# Patient Record
Sex: MALE | Race: OTHER | HISPANIC OR LATINO | URBAN - METROPOLITAN AREA
[De-identification: names, ages, dates, MRNs, and addresses within clinical notes are randomized per-mention and may not be internally consistent; named-entity substitution may affect disease eponyms.]

---

## 2024-01-01 ENCOUNTER — EMERGENCY (EMERGENCY)
Facility: HOSPITAL | Age: 54
LOS: 0 days | Discharge: HOME | End: 2024-01-02
Attending: STUDENT IN AN ORGANIZED HEALTH CARE EDUCATION/TRAINING PROGRAM
Payer: SELF-PAY

## 2024-01-01 VITALS
SYSTOLIC BLOOD PRESSURE: 120 MMHG | DIASTOLIC BLOOD PRESSURE: 75 MMHG | HEART RATE: 101 BPM | TEMPERATURE: 98 F | RESPIRATION RATE: 20 BRPM | OXYGEN SATURATION: 94 %

## 2024-01-01 DIAGNOSIS — Z20.822 CONTACT WITH AND (SUSPECTED) EXPOSURE TO COVID-19: ICD-10-CM

## 2024-01-01 DIAGNOSIS — R06.02 SHORTNESS OF BREATH: ICD-10-CM

## 2024-01-01 DIAGNOSIS — R05.9 COUGH, UNSPECIFIED: ICD-10-CM

## 2024-01-01 DIAGNOSIS — F10.929 ALCOHOL USE, UNSPECIFIED WITH INTOXICATION, UNSPECIFIED: ICD-10-CM

## 2024-01-01 LAB
ALBUMIN SERPL ELPH-MCNC: 3.9 G/DL — SIGNIFICANT CHANGE UP (ref 3.3–5)
ALBUMIN SERPL ELPH-MCNC: 3.9 G/DL — SIGNIFICANT CHANGE UP (ref 3.3–5)
ALP SERPL-CCNC: 77 U/L — SIGNIFICANT CHANGE UP (ref 40–120)
ALP SERPL-CCNC: 77 U/L — SIGNIFICANT CHANGE UP (ref 40–120)
ALT FLD-CCNC: 17 U/L — SIGNIFICANT CHANGE UP (ref 12–78)
ALT FLD-CCNC: 17 U/L — SIGNIFICANT CHANGE UP (ref 12–78)
ANION GAP SERPL CALC-SCNC: 4 MMOL/L — LOW (ref 5–17)
ANION GAP SERPL CALC-SCNC: 4 MMOL/L — LOW (ref 5–17)
APTT BLD: 35.9 SEC — HIGH (ref 24.5–35.6)
APTT BLD: 35.9 SEC — HIGH (ref 24.5–35.6)
AST SERPL-CCNC: 44 U/L — HIGH (ref 15–37)
AST SERPL-CCNC: 44 U/L — HIGH (ref 15–37)
BASOPHILS # BLD AUTO: 0 K/UL — SIGNIFICANT CHANGE UP (ref 0–0.2)
BASOPHILS # BLD AUTO: 0 K/UL — SIGNIFICANT CHANGE UP (ref 0–0.2)
BASOPHILS NFR BLD AUTO: 0 % — SIGNIFICANT CHANGE UP (ref 0–2)
BASOPHILS NFR BLD AUTO: 0 % — SIGNIFICANT CHANGE UP (ref 0–2)
BILIRUB SERPL-MCNC: 0.1 MG/DL — LOW (ref 0.2–1.2)
BILIRUB SERPL-MCNC: 0.1 MG/DL — LOW (ref 0.2–1.2)
BUN SERPL-MCNC: 5 MG/DL — LOW (ref 7–23)
BUN SERPL-MCNC: 5 MG/DL — LOW (ref 7–23)
CALCIUM SERPL-MCNC: 8.4 MG/DL — LOW (ref 8.5–10.1)
CALCIUM SERPL-MCNC: 8.4 MG/DL — LOW (ref 8.5–10.1)
CHLORIDE SERPL-SCNC: 111 MMOL/L — HIGH (ref 96–108)
CHLORIDE SERPL-SCNC: 111 MMOL/L — HIGH (ref 96–108)
CO2 SERPL-SCNC: 28 MMOL/L — SIGNIFICANT CHANGE UP (ref 22–31)
CO2 SERPL-SCNC: 28 MMOL/L — SIGNIFICANT CHANGE UP (ref 22–31)
CREAT SERPL-MCNC: 0.73 MG/DL — SIGNIFICANT CHANGE UP (ref 0.5–1.3)
CREAT SERPL-MCNC: 0.73 MG/DL — SIGNIFICANT CHANGE UP (ref 0.5–1.3)
EGFR: 108 ML/MIN/1.73M2 — SIGNIFICANT CHANGE UP
EGFR: 108 ML/MIN/1.73M2 — SIGNIFICANT CHANGE UP
EOSINOPHIL # BLD AUTO: 0.23 K/UL — SIGNIFICANT CHANGE UP (ref 0–0.5)
EOSINOPHIL # BLD AUTO: 0.23 K/UL — SIGNIFICANT CHANGE UP (ref 0–0.5)
EOSINOPHIL NFR BLD AUTO: 4 % — SIGNIFICANT CHANGE UP (ref 0–6)
EOSINOPHIL NFR BLD AUTO: 4 % — SIGNIFICANT CHANGE UP (ref 0–6)
ETHANOL SERPL-MCNC: 345 MG/DL — HIGH (ref 0–10)
ETHANOL SERPL-MCNC: 345 MG/DL — HIGH (ref 0–10)
FLUAV AG NPH QL: SIGNIFICANT CHANGE UP
FLUAV AG NPH QL: SIGNIFICANT CHANGE UP
FLUBV AG NPH QL: SIGNIFICANT CHANGE UP
FLUBV AG NPH QL: SIGNIFICANT CHANGE UP
GLUCOSE SERPL-MCNC: 83 MG/DL — SIGNIFICANT CHANGE UP (ref 70–99)
GLUCOSE SERPL-MCNC: 83 MG/DL — SIGNIFICANT CHANGE UP (ref 70–99)
HCT VFR BLD CALC: 41.1 % — SIGNIFICANT CHANGE UP (ref 39–50)
HCT VFR BLD CALC: 41.1 % — SIGNIFICANT CHANGE UP (ref 39–50)
HGB BLD-MCNC: 13.7 G/DL — SIGNIFICANT CHANGE UP (ref 13–17)
HGB BLD-MCNC: 13.7 G/DL — SIGNIFICANT CHANGE UP (ref 13–17)
INR BLD: 0.89 RATIO — SIGNIFICANT CHANGE UP (ref 0.85–1.18)
INR BLD: 0.89 RATIO — SIGNIFICANT CHANGE UP (ref 0.85–1.18)
LYMPHOCYTES # BLD AUTO: 1.89 K/UL — SIGNIFICANT CHANGE UP (ref 1–3.3)
LYMPHOCYTES # BLD AUTO: 1.89 K/UL — SIGNIFICANT CHANGE UP (ref 1–3.3)
LYMPHOCYTES # BLD AUTO: 33 % — SIGNIFICANT CHANGE UP (ref 13–44)
LYMPHOCYTES # BLD AUTO: 33 % — SIGNIFICANT CHANGE UP (ref 13–44)
MAGNESIUM SERPL-MCNC: 2.2 MG/DL — SIGNIFICANT CHANGE UP (ref 1.6–2.6)
MAGNESIUM SERPL-MCNC: 2.2 MG/DL — SIGNIFICANT CHANGE UP (ref 1.6–2.6)
MANUAL SMEAR VERIFICATION: SIGNIFICANT CHANGE UP
MANUAL SMEAR VERIFICATION: SIGNIFICANT CHANGE UP
MCHC RBC-ENTMCNC: 31 PG — SIGNIFICANT CHANGE UP (ref 27–34)
MCHC RBC-ENTMCNC: 31 PG — SIGNIFICANT CHANGE UP (ref 27–34)
MCHC RBC-ENTMCNC: 33.3 GM/DL — SIGNIFICANT CHANGE UP (ref 32–36)
MCHC RBC-ENTMCNC: 33.3 GM/DL — SIGNIFICANT CHANGE UP (ref 32–36)
MCV RBC AUTO: 93 FL — SIGNIFICANT CHANGE UP (ref 80–100)
MCV RBC AUTO: 93 FL — SIGNIFICANT CHANGE UP (ref 80–100)
MONOCYTES # BLD AUTO: 0.51 K/UL — SIGNIFICANT CHANGE UP (ref 0–0.9)
MONOCYTES # BLD AUTO: 0.51 K/UL — SIGNIFICANT CHANGE UP (ref 0–0.9)
MONOCYTES NFR BLD AUTO: 9 % — SIGNIFICANT CHANGE UP (ref 2–14)
MONOCYTES NFR BLD AUTO: 9 % — SIGNIFICANT CHANGE UP (ref 2–14)
NEUTROPHILS # BLD AUTO: 3.09 K/UL — SIGNIFICANT CHANGE UP (ref 1.8–7.4)
NEUTROPHILS # BLD AUTO: 3.09 K/UL — SIGNIFICANT CHANGE UP (ref 1.8–7.4)
NEUTROPHILS NFR BLD AUTO: 54 % — SIGNIFICANT CHANGE UP (ref 43–77)
NEUTROPHILS NFR BLD AUTO: 54 % — SIGNIFICANT CHANGE UP (ref 43–77)
NRBC # BLD: 0 /100 WBCS — SIGNIFICANT CHANGE UP (ref 0–0)
NRBC # BLD: 0 /100 WBCS — SIGNIFICANT CHANGE UP (ref 0–0)
NRBC # BLD: SIGNIFICANT CHANGE UP /100 WBCS (ref 0–0)
NRBC # BLD: SIGNIFICANT CHANGE UP /100 WBCS (ref 0–0)
NT-PROBNP SERPL-SCNC: 14 PG/ML — SIGNIFICANT CHANGE UP (ref 0–125)
NT-PROBNP SERPL-SCNC: 14 PG/ML — SIGNIFICANT CHANGE UP (ref 0–125)
PLAT MORPH BLD: NORMAL — SIGNIFICANT CHANGE UP
PLAT MORPH BLD: NORMAL — SIGNIFICANT CHANGE UP
PLATELET # BLD AUTO: 313 K/UL — SIGNIFICANT CHANGE UP (ref 150–400)
PLATELET # BLD AUTO: 313 K/UL — SIGNIFICANT CHANGE UP (ref 150–400)
POTASSIUM SERPL-MCNC: 3.6 MMOL/L — SIGNIFICANT CHANGE UP (ref 3.5–5.3)
POTASSIUM SERPL-MCNC: 3.6 MMOL/L — SIGNIFICANT CHANGE UP (ref 3.5–5.3)
POTASSIUM SERPL-SCNC: 3.6 MMOL/L — SIGNIFICANT CHANGE UP (ref 3.5–5.3)
POTASSIUM SERPL-SCNC: 3.6 MMOL/L — SIGNIFICANT CHANGE UP (ref 3.5–5.3)
PROT SERPL-MCNC: 8.1 GM/DL — SIGNIFICANT CHANGE UP (ref 6–8.3)
PROT SERPL-MCNC: 8.1 GM/DL — SIGNIFICANT CHANGE UP (ref 6–8.3)
PROTHROM AB SERPL-ACNC: 10.1 SEC — SIGNIFICANT CHANGE UP (ref 9.5–13)
PROTHROM AB SERPL-ACNC: 10.1 SEC — SIGNIFICANT CHANGE UP (ref 9.5–13)
RBC # BLD: 4.42 M/UL — SIGNIFICANT CHANGE UP (ref 4.2–5.8)
RBC # BLD: 4.42 M/UL — SIGNIFICANT CHANGE UP (ref 4.2–5.8)
RBC # FLD: 15.8 % — HIGH (ref 10.3–14.5)
RBC # FLD: 15.8 % — HIGH (ref 10.3–14.5)
RBC BLD AUTO: NORMAL — SIGNIFICANT CHANGE UP
RBC BLD AUTO: NORMAL — SIGNIFICANT CHANGE UP
RSV RNA NPH QL NAA+NON-PROBE: DETECTED
RSV RNA NPH QL NAA+NON-PROBE: DETECTED
SARS-COV-2 RNA SPEC QL NAA+PROBE: SIGNIFICANT CHANGE UP
SARS-COV-2 RNA SPEC QL NAA+PROBE: SIGNIFICANT CHANGE UP
SODIUM SERPL-SCNC: 143 MMOL/L — SIGNIFICANT CHANGE UP (ref 135–145)
SODIUM SERPL-SCNC: 143 MMOL/L — SIGNIFICANT CHANGE UP (ref 135–145)
TROPONIN I, HIGH SENSITIVITY RESULT: 4.16 NG/L — SIGNIFICANT CHANGE UP
TROPONIN I, HIGH SENSITIVITY RESULT: 4.16 NG/L — SIGNIFICANT CHANGE UP
WBC # BLD: 5.72 K/UL — SIGNIFICANT CHANGE UP (ref 3.8–10.5)
WBC # BLD: 5.72 K/UL — SIGNIFICANT CHANGE UP (ref 3.8–10.5)
WBC # FLD AUTO: 5.72 K/UL — SIGNIFICANT CHANGE UP (ref 3.8–10.5)
WBC # FLD AUTO: 5.72 K/UL — SIGNIFICANT CHANGE UP (ref 3.8–10.5)

## 2024-01-01 PROCEDURE — 71045 X-RAY EXAM CHEST 1 VIEW: CPT

## 2024-01-01 PROCEDURE — 83880 ASSAY OF NATRIURETIC PEPTIDE: CPT

## 2024-01-01 PROCEDURE — 99285 EMERGENCY DEPT VISIT HI MDM: CPT

## 2024-01-01 PROCEDURE — 85730 THROMBOPLASTIN TIME PARTIAL: CPT

## 2024-01-01 PROCEDURE — 93010 ELECTROCARDIOGRAM REPORT: CPT

## 2024-01-01 PROCEDURE — 83735 ASSAY OF MAGNESIUM: CPT

## 2024-01-01 PROCEDURE — 93005 ELECTROCARDIOGRAM TRACING: CPT

## 2024-01-01 PROCEDURE — 99285 EMERGENCY DEPT VISIT HI MDM: CPT | Mod: 25

## 2024-01-01 PROCEDURE — 36415 COLL VENOUS BLD VENIPUNCTURE: CPT

## 2024-01-01 PROCEDURE — 80307 DRUG TEST PRSMV CHEM ANLYZR: CPT

## 2024-01-01 PROCEDURE — 0241U: CPT

## 2024-01-01 PROCEDURE — 71045 X-RAY EXAM CHEST 1 VIEW: CPT | Mod: 26

## 2024-01-01 PROCEDURE — 84484 ASSAY OF TROPONIN QUANT: CPT

## 2024-01-01 PROCEDURE — 96374 THER/PROPH/DIAG INJ IV PUSH: CPT

## 2024-01-01 PROCEDURE — 94640 AIRWAY INHALATION TREATMENT: CPT

## 2024-01-01 PROCEDURE — 80053 COMPREHEN METABOLIC PANEL: CPT

## 2024-01-01 PROCEDURE — 85610 PROTHROMBIN TIME: CPT

## 2024-01-01 PROCEDURE — 85025 COMPLETE CBC W/AUTO DIFF WBC: CPT

## 2024-01-01 RX ORDER — IPRATROPIUM/ALBUTEROL SULFATE 18-103MCG
3 AEROSOL WITH ADAPTER (GRAM) INHALATION ONCE
Refills: 0 | Status: COMPLETED | OUTPATIENT
Start: 2024-01-01 | End: 2024-01-01

## 2024-01-01 RX ADMIN — Medication 3 MILLILITER(S): at 20:35

## 2024-01-01 RX ADMIN — Medication 125 MILLIGRAM(S): at 20:35

## 2024-01-01 NOTE — ED ADULT TRIAGE NOTE - CHIEF COMPLAINT QUOTE
pt presents to the ED - no ID and noted to have AOB. pt reports SOB and difficulty taking a deep breath. pt does not know his name or birthday at this time. denies drinking today. pt ambulatory to triage and appears to be in no acute distress.

## 2024-01-01 NOTE — ED PROVIDER NOTE - PHYSICAL EXAMINATION
GENERAL: awake, appears intoxicated, non-toxic appearing, no acute distress  HEENT: NCAT, EOMI, oral mucosa moist, normal conjunctiva  RESP: no respiratory distress, speaking in full sentences, diffuse expiratory wheezes  CV: RRR, no murmurs/rubs/gallops  ABDOMEN: soft, non-tender, non-distended, no guarding, no rebound tenderness  MSK: no visible deformities  NEURO: no focal sensory or motor deficits, CN 2-12 grossly intact  SKIN: warm, normal color, well perfused, no rash  PSYCH: normal affect

## 2024-01-01 NOTE — ED PROVIDER NOTE - PATIENT PORTAL LINK FT
You can access the FollowMyHealth Patient Portal offered by [facility] by registering at the following website: [email or web address]. By joining [employer]’s FollowMyHealth portal, you will also be able to view your health information using other applications (apps) compatible with our system. You can access the FollowMyHealth Patient Portal offered by [facility] by registering at the following website: [email or web address]. By joining [employer]’s FollowMyHealth portal, you will also be able to view your health information using other applications (apps) compatible with our system.

## 2024-01-01 NOTE — ED PROVIDER NOTE - NSFOLLOWUPINSTRUCTIONS_ED_ALL_ED_FT
Profesional que asiste al paciente: Eddie, Godwin  Consumo excesivo de alcohol y nutrición  Alcohol Abuse and Nutrition    El consumo excesivo de alcohol es cualquier patrón de consumo de alcohol que perjudique la real, las relaciones o el trabajo. El consumo excesivo de alcohol puede causar nutrición deficiente (malnutrición o desnutrición) y erendira falta de nutrientes (carencias nutritivas), lo que george vez ocasione otras complicaciones. El consumo excesivo de alcohol causa desnutrición y carencia nutritivas de dos formas:    Hace que el hígado funcione de forma anormal. [place] afecta la forma en la que el organismo divide (descompone) y absorbe los nutrientes de los alimentos.  Hace que la persona coma mal. Muchas personas que consumen alcohol en exceso no ingieren la cantidad suficiente de carbohidratos, proteínas, grasas, vitaminas y minerales.    Los nutrientes que suelen faltar (ser deficitarios) en las personas que consumen alcohol en exceso incluyen:    Vitaminas.    Vitamina A. Esta es importante para la visión, el metabolismo y la capacidad para combatir las infecciones (inmunidad).  Vitaminas B. Estas incluyen vitaminas tales mac el folato, la tiamina y la niacina. Estas son necesarias para el crecimiento de las nuevas células.  Vitamina C. Esta desempeña un papel importante en la cicatrización de las heridas, la inmunidad y ayuda al organismo a absorber el suzie.  Vitamina D. Esta es necesaria para que el organismo absorba y use el calcio. Es producida por el hígado, carolina puede obtenerse también de alimentos y de la exposición al sol.  Minerales.    Calcio. Staci es necesario para tener huesos saludables, así mac erendira función del corazón y los vasos sanguíneos (cardiovascular) saludable.  Suzie. Es importante para la kiera, los músculos y el funcionamiento del sistema nervioso.  Magnesio. Desempeña un papel importante en el funcionamiento muscular y nervioso, y ayuda a controlar el nivel de azúcar en la kiera y la presión arterial.  Zinc. Staci es importante para el normal funcionamiento del sistema nervioso y el aparato digestivo (tubo digestivo).    Si mae que tiene un problema de dependencia del alcohol, o si le boris dejar de beber porque se siente enfermo o diferente cuando no varsha alcohol, hable con ramos médico u otro profesional de la real acerca de dónde obtener ayuda.    La nutrición es un factor fundamental del tratamiento para el consumo excesivo de alcohol. El médico o el especialista en dieta y nutrición (nutricionista) trabajarán con usted para diseñar un plan que pueda ayudar a que el organismo recupere los nutrientes y a evitar el riesgo de posibles complicaciones.    ¿En qué consiste el plan?     El nutricionista puede desarrollar un plan de alimentación específico en función de ramos estado y de otros problemas que pueda tener. Un plan de alimentación incluirá por lo general:    Erendira dieta equilibrada.    Cereales: de 6 a 8 onzas (170 a 227 g) por día. Algunos ejemplos de 1 onza de cereales integrales incluyen 1 taza de cereales de belen integral, ½ taza de arroz integral o 1 rodaja de pan de belen integral.  Verduras: de 2 a 3 tazas por día. Algunos ejemplos de 1 taza de verduras incluyen 2 zanahorias medianas, 1 tomate adriana o 2 tallos de apio.  Frutas: de 1 a 2 tazas por día. Algunos ejemplos de 1 taza de frutas incluyen 1 banana adriana, 1 manzana pequeña, 8 fresas grandes o 1 naranja adriana.  Carne y otras proteínas: de 5 a 6 onzas (142 a 170 g) por día.    Erendira porción de carne o pescado del tamaño de un manolo de cartas pesa alrededor de 3 a 4 onzas.  Los alimentos que proporcionan 1 onza de proteínas incluyen 1 huevo, ½ taza de gwen secos o semillas, o 1 cucharada (16 g) de mantequilla de maní.  Lácteos: de 2 a 3 tazas por día. Algunos ejemplos de 1 taza de lácteos son 8 onzas (230 ml) de leche, 8 onzas (230 g) de yogur o 1½ onzas (44 g) de queso natural.  Suplementos de vitaminas y minerales.    ¿Cuáles son algunos consejos para seguir staci plan?  Ingiera comidas y refrigerios con frecuencia. Intente hacer 5 o 6 comidas pequeñas por día.  [place] los suplementos vitamínicos y minerales mac se lo haya indicado el nutricionista.  Si está desnutrido o si se lo recomienda el nutricionista:    Puede seguir erendira dieta yoshi en proteínas y calorías. Puede incluir:    2000 a 3000 calorías (kilocalorías) al día.  70 a 100 g (gramos) de proteínas al día.  Se le puede indicar que siga erendira dieta que incluya erendira bebida nutricional completa. Staci tipo de suplemento nutricional puede ayudar a que el organismo recupere las calorías, las proteínas y las vitaminas. En función de ramos estado, george vez le recomienden que consuma esta bebida en lugar de comer o que la agregue a las comidas.  Ciertos medicamentos pueden causar cambios en el apetito, el sentido del gusto y el peso. Trabaje con el médico y el nutricionista para hacer cambios en brice medicamentos y en el plan de alimentación.  Si no puede ingerir la cantidad suficiente de comida y calorías por boca, el médico puede recomendar que le coloquen erendira sonda de alimentación. Esta sonda proporciona suplementos nutricionales directamente al estómago.    Alimentos recomendados  Coma alimentos que ximena ricos en moléculas que eviten que el oxígeno reaccione con la comida (antioxidantes). Estos alimentos incluyen uvas, gwen rojos, gwen secos, té sarah, y verduras sarah oscuro o anaranjadas. [place] puede ayudar a prevenir parte de la agresión que sufre el hígado cuando se consume alcohol.  Coma diariamente erendira variedad de frutas y verduras frescas. [place] ayudará a aportar fibras y vitaminas a ramos dieta.  Alina erendira gran cantidad de agua y otros líquidos transparentes, mac jugo de manzana y caldo. Intente beber por lo menos de 48 a 64 oz (de 1.5 a 2 l) de agua por día.  Incluya en ramos dieta alimentos fortificados con vitaminas y minerales. Los alimentos que suelen estar fortificados son, entre otros, la leche, el jugo de naranja, los cereales y el pan.  Coma alimentos variados con alto contenido de ácidos grasos omega 3 y omega 6. Estos incluyen, pescado, gwen secos y semillas, y porotos de soja. Estos alimentos pueden ayudar a que el hígado se recupere y, además, a estabilizar el estado de ánimo.  Si es vegetariano:    Coma alimentos variados ricos en proteínas.  A la hora de las comidas y las colaciones, combine cereales integrales con proteínas vegetales. Por ejemplo, coma arroz con frijoles, unte erendira tostada integral con mantequilla de maní o ingiera emery con semillas de girasol.    Es posible que los productos que se enumeran más arriba no constituyan erendira lista completa de los alimentos y las bebidas que puede lucy. Consulte a un nutricionista para obtener más información.    Alimentos que deben evitarse  Evite los alimentos y las bebidas con alto contenido de grasa y azúcar. Las bebidas con mucha azúcar, los refrigerios salados y los dulces contienen calorías vacías. [place] significa que carecen de nutrientes importantes, mac las proteínas, las fibras y las vitaminas.  Evite lucy alcohol. Esta es la mejor forma de evitar la desnutrición debido al consumo excesivo de alcohol. Si debe beber, alina cantidades moderadas. Beber en forma moderada significa limitarse a no más de 1 medida por día si es landen y no está embarazada, y de 2 medidas por día si es hombre. Erendira medida equivale a 12 oz (355 ml) de cerveza, 5 oz (148 ml) de vino o 1½ oz (44 ml) de bebidas alcohólicas de yoshi graduación.  Limite el consumo de cafeína. Reemplace las bebidas mac el café y té lucia con café descafeinado y té de hierbas descafeinado.    Es posible que los productos que se enumeran más arriba no constituyan erendira lista completa de los alimentos y las bebidas que debe evitar. Consulte a un nutricionista para obtener más información.    Resumen  El consumo excesivo de alcohol puede causar nutrición deficiente (malnutrición o desnutrición) y erendira falta de nutrientes (carencias nutritivas), lo que george vez ocasione otros problemas de real.  Las carencias nutritivas frecuentes incluyen las carencias de vitaminas (A, B, C y D) y de minerales (calcio, suzie, magnesio y zinc).  La nutrición es un factor fundamental del tratamiento para el consumo excesivo de alcohol.  EL médico y el nutricionista pueden ayudarlo a desarrollar un plan de alimentación específico que incluya erendira dieta equilibrada más suplementos vitamínicos y minerales.    NOTAS ADICIONALES E INSTRUCCIONES    Please follow up with your Primary MD in 24-48 hr.  Seek immediate medical care for any new/worsening signs or symptoms.     Document Released: 10/12/2006 Document Revised: 4/7/2020 Document Reviewed: 9/4/2018  [place] Interactive Patient Education ©2019 [employer] Inc. This information is not intended to replace advice given to you by your health care provider. Make sure you discuss any questions you have with your health care provider. Profesional que asiste al paciente: Eddie, Godwin  Consumo excesivo de alcohol y nutrición  Alcohol Abuse and Nutrition    El consumo excesivo de alcohol es cualquier patrón de consumo de alcohol que perjudique la real, las relaciones o el trabajo. El consumo excesivo de alcohol puede causar nutrición deficiente (malnutrición o desnutrición) y erendira falta de nutrientes (carencias nutritivas), lo que george vez ocasione otras complicaciones. El consumo excesivo de alcohol causa desnutrición y carencia nutritivas de dos formas:    Hace que el hígado funcione de forma anormal. [place] afecta la forma en la que el organismo divide (descompone) y absorbe los nutrientes de los alimentos.  Hace que la persona coma mal. Muchas personas que consumen alcohol en exceso no ingieren la cantidad suficiente de carbohidratos, proteínas, grasas, vitaminas y minerales.    Los nutrientes que suelen faltar (ser deficitarios) en las personas que consumen alcohol en exceso incluyen:    Vitaminas.    Vitamina A. Esta es importante para la visión, el metabolismo y la capacidad para combatir las infecciones (inmunidad).  Vitaminas B. Estas incluyen vitaminas tales mac el folato, la tiamina y la niacina. Estas son necesarias para el crecimiento de las nuevas células.  Vitamina C. Esta desempeña un papel importante en la cicatrización de las heridas, la inmunidad y ayuda al organismo a absorber el suzie.  Vitamina D. Esta es necesaria para que el organismo absorba y use el calcio. Es producida por el hígado, carolina puede obtenerse también de alimentos y de la exposición al sol.  Minerales.    Calcio. Staci es necesario para tener huesos saludables, así mac erendira función del corazón y los vasos sanguíneos (cardiovascular) saludable.  Suzie. Es importante para la kiera, los músculos y el funcionamiento del sistema nervioso.  Magnesio. Desempeña un papel importante en el funcionamiento muscular y nervioso, y ayuda a controlar el nivel de azúcar en la kiera y la presión arterial.  Zinc. Staci es importante para el normal funcionamiento del sistema nervioso y el aparato digestivo (tubo digestivo).    Si mae que tiene un problema de dependencia del alcohol, o si le boris dejar de beber porque se siente enfermo o diferente cuando no varsha alcohol, hable con ramos médico u otro profesional de la real acerca de dónde obtener ayuda.    La nutrición es un factor fundamental del tratamiento para el consumo excesivo de alcohol. El médico o el especialista en dieta y nutrición (nutricionista) trabajarán con usted para diseñar un plan que pueda ayudar a que el organismo recupere los nutrientes y a evitar el riesgo de posibles complicaciones.    ¿En qué consiste el plan?     El nutricionista puede desarrollar un plan de alimentación específico en función de ramos estado y de otros problemas que pueda tener. Un plan de alimentación incluirá por lo general:    Erendira dieta equilibrada.    Cereales: de 6 a 8 onzas (170 a 227 g) por día. Algunos ejemplos de 1 onza de cereales integrales incluyen 1 taza de cereales de belen integral, ½ taza de arroz integral o 1 rodaja de pan de belen integral.  Verduras: de 2 a 3 tazas por día. Algunos ejemplos de 1 taza de verduras incluyen 2 zanahorias medianas, 1 tomate adriana o 2 tallos de apio.  Frutas: de 1 a 2 tazas por día. Algunos ejemplos de 1 taza de frutas incluyen 1 banana adriana, 1 manzana pequeña, 8 fresas grandes o 1 naranja adriana.  Carne y otras proteínas: de 5 a 6 onzas (142 a 170 g) por día.    Erendira porción de carne o pescado del tamaño de un manolo de cartas pesa alrededor de 3 a 4 onzas.  Los alimentos que proporcionan 1 onza de proteínas incluyen 1 huevo, ½ taza de gwen secos o semillas, o 1 cucharada (16 g) de mantequilla de maní.  Lácteos: de 2 a 3 tazas por día. Algunos ejemplos de 1 taza de lácteos son 8 onzas (230 ml) de leche, 8 onzas (230 g) de yogur o 1½ onzas (44 g) de queso natural.  Suplementos de vitaminas y minerales.    ¿Cuáles son algunos consejos para seguir staci plan?  Ingiera comidas y refrigerios con frecuencia. Intente hacer 5 o 6 comidas pequeñas por día.  [place] los suplementos vitamínicos y minerales mac se lo haya indicado el nutricionista.  Si está desnutrido o si se lo recomienda el nutricionista:    Puede seguir erendira dieta yoshi en proteínas y calorías. Puede incluir:    2000 a 3000 calorías (kilocalorías) al día.  70 a 100 g (gramos) de proteínas al día.  Se le puede indicar que siga erendira dieta que incluya erendira bebida nutricional completa. Staci tipo de suplemento nutricional puede ayudar a que el organismo recupere las calorías, las proteínas y las vitaminas. En función de ramos estado, george vez le recomienden que consuma esta bebida en lugar de comer o que la agregue a las comidas.  Ciertos medicamentos pueden causar cambios en el apetito, el sentido del gusto y el peso. Trabaje con el médico y el nutricionista para hacer cambios en brice medicamentos y en el plan de alimentación.  Si no puede ingerir la cantidad suficiente de comida y calorías por boca, el médico puede recomendar que le coloquen erendira sonda de alimentación. Esta sonda proporciona suplementos nutricionales directamente al estómago.    Alimentos recomendados  Coma alimentos que ximena ricos en moléculas que eviten que el oxígeno reaccione con la comida (antioxidantes). Estos alimentos incluyen uvas, gwen rojos, gwen secos, té sarah, y verduras sarah oscuro o anaranjadas. [place] puede ayudar a prevenir parte de la agresión que sufre el hígado cuando se consume alcohol.  Coma diariamente erendira variedad de frutas y verduras frescas. [place] ayudará a aportar fibras y vitaminas a ramos dieta.  Alina erendira gran cantidad de agua y otros líquidos transparentes, mac jugo de manzana y caldo. Intente beber por lo menos de 48 a 64 oz (de 1.5 a 2 l) de agua por día.  Incluya en ramos dieta alimentos fortificados con vitaminas y minerales. Los alimentos que suelen estar fortificados son, entre otros, la leche, el jugo de naranja, los cereales y el pan.  Coma alimentos variados con alto contenido de ácidos grasos omega 3 y omega 6. Estos incluyen, pescado, gwen secos y semillas, y porotos de soja. Estos alimentos pueden ayudar a que el hígado se recupere y, además, a estabilizar el estado de ánimo.  Si es vegetariano:    Coma alimentos variados ricos en proteínas.  A la hora de las comidas y las colaciones, combine cereales integrales con proteínas vegetales. Por ejemplo, coma arroz con frijoles, unte erendira tostada integral con mantequilla de maní o ingiera emery con semillas de girasol.    Es posible que los productos que se enumeran más arriba no constituyan erendira lista completa de los alimentos y las bebidas que puede lucy. Consulte a un nutricionista para obtener más información.    Alimentos que deben evitarse  Evite los alimentos y las bebidas con alto contenido de grasa y azúcar. Las bebidas con mucha azúcar, los refrigerios salados y los dulces contienen calorías vacías. [place] significa que carecen de nutrientes importantes, mac las proteínas, las fibras y las vitaminas.  Evite lucy alcohol. Esta es la mejor forma de evitar la desnutrición debido al consumo excesivo de alcohol. Si debe beber, alina cantidades moderadas. Beber en forma moderada significa limitarse a no más de 1 medida por día si es landen y no está embarazada, y de 2 medidas por día si es hombre. Erendira medida equivale a 12 oz (355 ml) de cerveza, 5 oz (148 ml) de vino o 1½ oz (44 ml) de bebidas alcohólicas de yoshi graduación.  Limite el consumo de cafeína. Reemplace las bebidas mac el café y té lucia con café descafeinado y té de hierbas descafeinado.    Es posible que los productos que se enumeran más arriba no constituyan erendira lista completa de los alimentos y las bebidas que debe evitar. Consulte a un nutricionista para obtener más información.    Resumen  El consumo excesivo de alcohol puede causar nutrición deficiente (malnutrición o desnutrición) y erendira falta de nutrientes (carencias nutritivas), lo que george vez ocasione otros problemas de real.  Las carencias nutritivas frecuentes incluyen las carencias de vitaminas (A, B, C y D) y de minerales (calcio, suzie, magnesio y zinc).  La nutrición es un factor fundamental del tratamiento para el consumo excesivo de alcohol.  EL médico y el nutricionista pueden ayudarlo a desarrollar un plan de alimentación específico que incluya erendira dieta equilibrada más suplementos vitamínicos y minerales.    NOTAS ADICIONALES E INSTRUCCIONES    Please follow up with your Primary MD in 24-48 hr.  Seek immediate medical care for any new/worsening signs or symptoms.     Document Released: 10/12/2006 Document Revised: 4/7/2020 Document Reviewed: 9/4/2018  [employer] Interactive Patient Education ©2019 Elsevier Inc. This information is not intended to replace advice given to you by your health care provider. Make sure you discuss any questions you have with your health care provider.

## 2024-01-01 NOTE — ED PROVIDER NOTE - CARE PLAN
1 Principal Discharge DX:	Atrial fibrillation with RVR   Principal Discharge DX:	Alcohol intoxication

## 2024-01-01 NOTE — ED PROVIDER NOTE - OBJECTIVE STATEMENT
53-year-old male, legal name Kenneth Hagan,  1970, MRN [id number].  Presents emergency department for shortness of breath.  Patient states for the past 1 week he has had upper respiratory symptoms with a nonproductive cough, has been taking albuterol at home without any relief.  Symptoms are worse at night making it difficult to sleep.  States he has since run out of his albuterol.  Endorses drinking "a few beers" last night.  Patient noted to have multiple hospital visits for intoxication.  Patient denies fever, chills, nausea, vomiting, diarrhea, or rash. 53-year-old male, legal name Kenneth Hagan,  1970, MRN [id number].  Presents emergency department for shortness of breath.  Patient states for the past 1 week he has had upper respiratory symptoms with a nonproductive cough, has been taking albuterol at home without any relief.  Symptoms are worse at night making it difficult to sleep.  States he has since run out of his albuterol.  Endorses drinking "a few beers" last night.  Patient noted to have multiple hospital visits for intoxication.  Patient denies fever, chills, nausea, vomiting, diarrhea, or rash.

## 2024-01-01 NOTE — ED PROVIDER NOTE - CLINICAL SUMMARY MEDICAL DECISION MAKING FREE TEXT BOX
53-year-old male here with shortness of breath and URI symptoms over the past 1 week.  Likely viral in nature, consider exacerbation of asthma, bronchitis, less likely pneumonia.  Plan for blood work, chest x-ray, nebs, steroids, flu/COVID swab, reassess for dispo.

## 2024-01-02 VITALS
HEART RATE: 84 BPM | OXYGEN SATURATION: 94 % | DIASTOLIC BLOOD PRESSURE: 77 MMHG | SYSTOLIC BLOOD PRESSURE: 106 MMHG | RESPIRATION RATE: 18 BRPM

## 2024-01-02 NOTE — ED ADULT NURSE NOTE - NSFALLASSESSNEED_ED_ALL_ED
----- Message from [employer]. Blake Love. sent at 11/7/2023 11:13 AM EST -----  Regarding: cPap  Contact: [phone]  Good morning for the last month of CPAP therapy have been challenging; extreme dry mouth and morning congestion.   - I've been wearing the chin strap but it's uncomfortable  and my mouth still opens. - I've increased humidity setting but anything about 6 on the humidity setting and I'm dealing with the rainout. Are there any other treatments to manage my sleep apena? no

## 2024-01-02 NOTE — ED ADULT NURSE NOTE - NSFALLUNIVINTERV_ED_ALL_ED
Bed/Stretcher in lowest position, wheels locked, appropriate side rails in place/Call bell, personal items and telephone in reach/Instruct patient to call for assistance before getting out of bed/chair/stretcher/Non-slip footwear applied when patient is off stretcher/[place] to call system/Physically safe environment - no spills, clutter or unnecessary equipment/Purposeful proactive rounding/Room/bathroom lighting operational, light cord in reach Bed/Stretcher in lowest position, wheels locked, appropriate side rails in place/Call bell, personal items and telephone in reach/Instruct patient to call for assistance before getting out of bed/chair/stretcher/Non-slip footwear applied when patient is off stretcher/[place] to call system/Physically safe environment - no spills, clutter or unnecessary equipment/Purposeful proactive rounding/Room/bathroom lighting operational, light cord in reach

## 2024-07-21 ENCOUNTER — EMERGENCY (EMERGENCY)
Facility: HOSPITAL | Age: 54
LOS: 0 days | Discharge: HOME | End: 2024-07-21
Attending: STUDENT IN AN ORGANIZED HEALTH CARE EDUCATION/TRAINING PROGRAM
Payer: MEDICAID

## 2024-07-21 VITALS
HEART RATE: 82 BPM | RESPIRATION RATE: 17 BRPM | OXYGEN SATURATION: 96 % | TEMPERATURE: 98 F | DIASTOLIC BLOOD PRESSURE: 64 MMHG | SYSTOLIC BLOOD PRESSURE: 105 MMHG

## 2024-07-21 VITALS
HEART RATE: 90 BPM | WEIGHT: 123.46 LBS | DIASTOLIC BLOOD PRESSURE: 61 MMHG | TEMPERATURE: 98 F | OXYGEN SATURATION: 93 % | RESPIRATION RATE: 19 BRPM | SYSTOLIC BLOOD PRESSURE: 101 MMHG

## 2024-07-21 DIAGNOSIS — J45.901 UNSPECIFIED ASTHMA WITH (ACUTE) EXACERBATION: ICD-10-CM

## 2024-07-21 DIAGNOSIS — R06.02 SHORTNESS OF BREATH: ICD-10-CM

## 2024-07-21 DIAGNOSIS — J18.9 PNEUMONIA, UNSPECIFIED ORGANISM: ICD-10-CM

## 2024-07-21 PROBLEM — Z78.9 OTHER SPECIFIED HEALTH STATUS: Chronic | Status: ACTIVE | Noted: 2024-01-01

## 2024-07-21 PROCEDURE — 71046 X-RAY EXAM CHEST 2 VIEWS: CPT | Mod: 26

## 2024-07-21 PROCEDURE — 71046 X-RAY EXAM CHEST 2 VIEWS: CPT

## 2024-07-21 PROCEDURE — 99053 MED SERV 10PM-8AM 24 HR FAC: CPT

## 2024-07-21 PROCEDURE — 99284 EMERGENCY DEPT VISIT MOD MDM: CPT

## 2024-07-21 PROCEDURE — 99285 EMERGENCY DEPT VISIT HI MDM: CPT | Mod: 25

## 2024-07-21 PROCEDURE — 94640 AIRWAY INHALATION TREATMENT: CPT

## 2024-07-21 RX ORDER — IPRATROPIUM BROMIDE AND ALBUTEROL SULFATE .5; 2.5 MG/3ML; MG/3ML
3 SOLUTION RESPIRATORY (INHALATION) ONCE
Refills: 0 | Status: COMPLETED | OUTPATIENT
Start: 2024-07-21 | End: 2024-07-21

## 2024-07-21 RX ORDER — PREDNISONE 20 MG/1
50 TABLET ORAL ONCE
Refills: 0 | Status: COMPLETED | OUTPATIENT
Start: 2024-07-21 | End: 2024-07-21

## 2024-07-21 RX ORDER — AMOXICILLIN 500 MG/1
2 CAPSULE ORAL
Qty: 42 | Refills: 0
Start: 2024-07-21 | End: 2024-07-27

## 2024-07-21 RX ORDER — AMOXICILLIN 500 MG/1
1000 CAPSULE ORAL
Refills: 0 | Status: DISCONTINUED | OUTPATIENT
Start: 2024-07-21 | End: 2024-07-21

## 2024-07-21 RX ORDER — PREDNISONE 20 MG/1
1 TABLET ORAL
Qty: 5 | Refills: 0
Start: 2024-07-21 | End: 2024-07-25

## 2024-07-21 RX ADMIN — PREDNISONE 50 MILLIGRAM(S): 20 TABLET ORAL at 01:49

## 2024-07-21 RX ADMIN — IPRATROPIUM BROMIDE AND ALBUTEROL SULFATE 3 MILLILITER(S): .5; 2.5 SOLUTION RESPIRATORY (INHALATION) at 01:49

## 2024-07-21 RX ADMIN — IPRATROPIUM BROMIDE AND ALBUTEROL SULFATE 3 MILLILITER(S): .5; 2.5 SOLUTION RESPIRATORY (INHALATION) at 02:10

## 2024-07-21 RX ADMIN — IPRATROPIUM BROMIDE AND ALBUTEROL SULFATE 3 MILLILITER(S): .5; 2.5 SOLUTION RESPIRATORY (INHALATION) at 02:01

## 2024-07-21 RX ADMIN — AMOXICILLIN 1000 MILLIGRAM(S): 500 CAPSULE ORAL at 02:49

## 2024-10-20 ENCOUNTER — EMERGENCY (EMERGENCY)
Facility: HOSPITAL | Age: 54
LOS: 0 days | Discharge: HOME | End: 2024-10-20
Attending: STUDENT IN AN ORGANIZED HEALTH CARE EDUCATION/TRAINING PROGRAM
Payer: MEDICAID

## 2024-10-20 VITALS
SYSTOLIC BLOOD PRESSURE: 196 MMHG | RESPIRATION RATE: 18 BRPM | DIASTOLIC BLOOD PRESSURE: 70 MMHG | HEART RATE: 80 BPM | OXYGEN SATURATION: 96 %

## 2024-10-20 VITALS
SYSTOLIC BLOOD PRESSURE: 139 MMHG | OXYGEN SATURATION: 96 % | HEART RATE: 100 BPM | WEIGHT: 122.14 LBS | RESPIRATION RATE: 18 BRPM | TEMPERATURE: 98 F | DIASTOLIC BLOOD PRESSURE: 97 MMHG

## 2024-10-20 DIAGNOSIS — R06.02 SHORTNESS OF BREATH: ICD-10-CM

## 2024-10-20 DIAGNOSIS — J45.901 UNSPECIFIED ASTHMA WITH (ACUTE) EXACERBATION: ICD-10-CM

## 2024-10-20 DIAGNOSIS — R05.9 COUGH, UNSPECIFIED: ICD-10-CM

## 2024-10-20 LAB
ALBUMIN SERPL ELPH-MCNC: 3.9 G/DL — SIGNIFICANT CHANGE UP (ref 3.3–5)
ALP SERPL-CCNC: 97 U/L — SIGNIFICANT CHANGE UP (ref 40–120)
ALT FLD-CCNC: 21 U/L — SIGNIFICANT CHANGE UP (ref 12–78)
ANION GAP SERPL CALC-SCNC: 7 MMOL/L — SIGNIFICANT CHANGE UP (ref 5–17)
AST SERPL-CCNC: 56 U/L — HIGH (ref 15–37)
BASOPHILS # BLD AUTO: 0.06 K/UL — SIGNIFICANT CHANGE UP (ref 0–0.2)
BASOPHILS NFR BLD AUTO: 1 % — SIGNIFICANT CHANGE UP (ref 0–2)
BILIRUB SERPL-MCNC: 0.3 MG/DL — SIGNIFICANT CHANGE UP (ref 0.2–1.2)
BUN SERPL-MCNC: 4 MG/DL — LOW (ref 7–23)
CALCIUM SERPL-MCNC: 8.4 MG/DL — LOW (ref 8.5–10.1)
CHLORIDE SERPL-SCNC: 108 MMOL/L — SIGNIFICANT CHANGE UP (ref 96–108)
CO2 SERPL-SCNC: 24 MMOL/L — SIGNIFICANT CHANGE UP (ref 22–31)
CREAT SERPL-MCNC: 0.7 MG/DL — SIGNIFICANT CHANGE UP (ref 0.5–1.3)
EGFR: 111 ML/MIN/1.73M2 — SIGNIFICANT CHANGE UP
EOSINOPHIL # BLD AUTO: 1.92 K/UL — HIGH (ref 0–0.5)
EOSINOPHIL NFR BLD AUTO: 32 % — HIGH (ref 0–6)
GLUCOSE SERPL-MCNC: 104 MG/DL — HIGH (ref 70–99)
HCT VFR BLD CALC: 40.5 % — SIGNIFICANT CHANGE UP (ref 39–50)
HGB BLD-MCNC: 13.6 G/DL — SIGNIFICANT CHANGE UP (ref 13–17)
LYMPHOCYTES # BLD AUTO: 2.22 K/UL — SIGNIFICANT CHANGE UP (ref 1–3.3)
LYMPHOCYTES # BLD AUTO: 37 % — SIGNIFICANT CHANGE UP (ref 13–44)
MANUAL SMEAR VERIFICATION: SIGNIFICANT CHANGE UP
MCHC RBC-ENTMCNC: 30.1 PG — SIGNIFICANT CHANGE UP (ref 27–34)
MCHC RBC-ENTMCNC: 33.6 GM/DL — SIGNIFICANT CHANGE UP (ref 32–36)
MCV RBC AUTO: 89.6 FL — SIGNIFICANT CHANGE UP (ref 80–100)
MONOCYTES # BLD AUTO: 0 K/UL — SIGNIFICANT CHANGE UP (ref 0–0.9)
MONOCYTES NFR BLD AUTO: 0 % — LOW (ref 2–14)
NEUTROPHILS # BLD AUTO: 1.8 K/UL — SIGNIFICANT CHANGE UP (ref 1.8–7.4)
NEUTROPHILS NFR BLD AUTO: 30 % — LOW (ref 43–77)
NRBC # BLD: 0 /100 WBCS — SIGNIFICANT CHANGE UP (ref 0–0)
NRBC # BLD: SIGNIFICANT CHANGE UP /100 WBCS (ref 0–0)
PLAT MORPH BLD: NORMAL — SIGNIFICANT CHANGE UP
PLATELET # BLD AUTO: 401 K/UL — HIGH (ref 150–400)
POTASSIUM SERPL-MCNC: 4.3 MMOL/L — SIGNIFICANT CHANGE UP (ref 3.5–5.3)
POTASSIUM SERPL-SCNC: 4.3 MMOL/L — SIGNIFICANT CHANGE UP (ref 3.5–5.3)
PROT SERPL-MCNC: 8.3 GM/DL — SIGNIFICANT CHANGE UP (ref 6–8.3)
RBC # BLD: 4.52 M/UL — SIGNIFICANT CHANGE UP (ref 4.2–5.8)
RBC # FLD: 17 % — HIGH (ref 10.3–14.5)
RBC BLD AUTO: NORMAL — SIGNIFICANT CHANGE UP
SODIUM SERPL-SCNC: 139 MMOL/L — SIGNIFICANT CHANGE UP (ref 135–145)
TROPONIN I, HIGH SENSITIVITY RESULT: 9.73 NG/L — SIGNIFICANT CHANGE UP
WBC # BLD: 6 K/UL — SIGNIFICANT CHANGE UP (ref 3.8–10.5)
WBC # FLD AUTO: 6 K/UL — SIGNIFICANT CHANGE UP (ref 3.8–10.5)

## 2024-10-20 PROCEDURE — 93010 ELECTROCARDIOGRAM REPORT: CPT

## 2024-10-20 PROCEDURE — 80053 COMPREHEN METABOLIC PANEL: CPT

## 2024-10-20 PROCEDURE — 36415 COLL VENOUS BLD VENIPUNCTURE: CPT

## 2024-10-20 PROCEDURE — 99285 EMERGENCY DEPT VISIT HI MDM: CPT | Mod: 25

## 2024-10-20 PROCEDURE — 96374 THER/PROPH/DIAG INJ IV PUSH: CPT

## 2024-10-20 PROCEDURE — 85025 COMPLETE CBC W/AUTO DIFF WBC: CPT

## 2024-10-20 PROCEDURE — 71045 X-RAY EXAM CHEST 1 VIEW: CPT

## 2024-10-20 PROCEDURE — 93005 ELECTROCARDIOGRAM TRACING: CPT

## 2024-10-20 PROCEDURE — 94640 AIRWAY INHALATION TREATMENT: CPT

## 2024-10-20 PROCEDURE — 71045 X-RAY EXAM CHEST 1 VIEW: CPT | Mod: 26

## 2024-10-20 PROCEDURE — 99284 EMERGENCY DEPT VISIT MOD MDM: CPT

## 2024-10-20 PROCEDURE — 84484 ASSAY OF TROPONIN QUANT: CPT

## 2024-10-20 RX ORDER — IPRATROPIUM BROMIDE AND ALBUTEROL SULFATE .5; 3 MG/3ML; MG/3ML
3 SOLUTION RESPIRATORY (INHALATION) ONCE
Refills: 0 | Status: COMPLETED | OUTPATIENT
Start: 2024-10-20 | End: 2024-10-20

## 2024-10-20 RX ADMIN — IPRATROPIUM BROMIDE AND ALBUTEROL SULFATE 3 MILLILITER(S): .5; 3 SOLUTION RESPIRATORY (INHALATION) at 20:22

## 2024-10-20 RX ADMIN — IPRATROPIUM BROMIDE AND ALBUTEROL SULFATE 3 MILLILITER(S): .5; 3 SOLUTION RESPIRATORY (INHALATION) at 19:34

## 2024-10-20 RX ADMIN — Medication 102 MILLIGRAM(S): at 20:18

## 2024-10-20 NOTE — ED STATDOCS - CLINICAL SUMMARY MEDICAL DECISION MAKING FREE TEXT BOX
Most likely acute asthma attack, though given chest pain with coughing will get labs and CXR to r/o cardiac ischemia vs PNA, though low suspicion. Will treat asthma and reassess.

## 2024-10-20 NOTE — ED ADULT NURSE NOTE - CHIEF COMPLAINT QUOTE
Pt presents to er with complaints of upper chest discomfort and SOB, denies fevers, denies medical history, pt denies drinking, comes in for further evaluation.

## 2024-10-20 NOTE — ED ADULT NURSE NOTE - NSSUHOSCREENINGYN_ED_ALL_ED
Occupational Therapy    Visit Type: re-evaluation  Precautions:  Medical precautions:  fall risk; standard precautions.  SBO. Hx HTN, legally blind, GERD, colostomy 9/2022 s/p exploratory lap with bowel resection 1/14   Lines:     Basic: IV, NG, external urinary catheter, O2 and wound vac      Lines in chart and on patient reviewed, precautions maintained throughout session.  Vision:     Current vision: low vision  Safety Measures: bed alarm and chair alarm  SUBJECTIVE  Patient agreed to participate in therapy this date.  Patient verbally agrees to allow the following to be present during session: friend (Friend Patricia and Daughter-in-law Keyla)  \"I need some water\"  Patient / Family Goal: return to previous functional status, maximize function and return home     OBJECTIVE     Cognitive Status   Level of Consciousness   - alert  Arousal Alertness   - appropriate responses to stimuli  Affect/Behavior    - cooperative and pleasant  Orientation    - Oriented to: person, place, time and situation  Functional Communication   - Overall Status: within functional limits  Pt is alert and oriented x4, however is mildly confused during session. Pt with varying reports of blindness/vision loss. Pt also reporting she didn't know she had an ostomy bag    Patient Activity Tolerance: 1 to 2 activity to rest         Bed Mobility  - Repositioning in bed: total assist - dependent , 2 persons  - Supine to sit: total assist - dependent   - Sit to supine: total assist - dependent , 2 persons  Pt requires max assist for supine > sit transfer, and mod - max assist x2 for sit <> supine and for boosting in bed        Interventions    Training provided: ADL training, balance retraining, bed mobility training, body mechanics, functional ambulation, transfer training and activity tolerance  Skilled input: verbal instruction/cues  Verbal Consent: Writer verbally educated and received verbal consent for hand placement, positioning of patient, and  techniques to be performed today from patient for clothing adjustments for techniques and therapist position for techniques as described above and how they are pertinent to the patient's plan of care.         ASSESSMENT  Impairments: activity tolerance, balance, bed mobility, cognitive, pain, strength, range of motion and desire/motivation  Functional Limitations: bed mobility, functional mobility, grooming, toileting, IADLs, dressing, bathing, functional transfers, participating in meaningful/purposeful activities and showering    Patient seen on 3rd floor nursing unit. She presents below baseline  which was modified independent  with mobility using a ww and modified independent for ADL performance. Currently lives alone in a(n) house with 2 dogs with very limited social support per pt.  For safe return to prior living situation the patient needs to be modified independent  for overall mobility and modified independent for ADL performance.  Pt admitted with SBO. Hx HTN, legally blind, GERD, colostomy 9/2022.  14 day re-assessment date : 1/26/23.    14 day re-assessment required this date: No  Modified Leah Scale required and assessed this session: No    Occupational Therapy re-assessment session today focused on bed mobility. Pt requires max assist for supine > sit transfer, and max assist x2 for sit > supine transfer and for boosting in bed at end of session. Pt able to tolerate sitting up at the EOB for ~5-minutes; pt initially requiring mod assist and progresses to contact guard assist. From OT perspective, recommending discharge to [facility] when medically appropriate.           Discharge Recommendations  Recommendation for Discharge Location: OT [place]: Post-acute facility with therapy needs            PT/OT Mobility Equipment for Discharge: none, owns ww         Progress: slow progress, medical status limitations and slow progress, decreased activity tolerance    • Skilled therapy is required to address these  limitations in attempt to maximize the patient's independence.    Education:   - Present and ready to learn: patient    Patient at End of Session:   Location: in bed  Safety measures: alarm system in place/re-engaged and call light within reach  Handoff to: nurse    PLAN  Suggestions for next session as indicated: Cotx, dangle EOB, progress to standing with steady as able          Frequency Comments: 1/16 2 of 3-5 by 1/18 (BOB) KN   Interventions: activity tolerance training, ADL retraining, bed mobility training, functional transfer training, therapeutic activity and transfer training  Agreement to plan and goals: patient agrees with goals and treatment plan      GOALS  Review Date: 1/30/2023  Long Term Goals: (to be met by time of discharge from hospital)  Grooming: Patient will complete grooming tasks modified independent.  Upper body dressing: Patient will complete upper body dressing modified independent.  Lower body dressing: Patient will complete lower body dressing modified independent.  Toileting: Patient will complete toileting modified independent.  Bathing: Patient will complete bathingmodified independent Toilet transfer: Patient will complete toilet transfer with modified independent.   Home setting transfer: Patient will complete home setting transfers with modified independent.         Documented in the chart in the following areas: Pain. Assessment. Plan.      Therapy procedure time and total treatment time can be found documented on the Time Entry flowsheet   Yes - the patient is able to be screened

## 2024-10-20 NOTE — ED STATDOCS - OBJECTIVE STATEMENT
used, ID#[id number]. 55 y/o M with PMHx of asthma presents to the ED c/o SOB and cough since this morning. Endorses chest pain after coughing fits. States that he feels like he has been having an asthma attack all day. States that he does not have enough money to buy an albuterol inhaler. Denies fever, chills.  used, ID#[id number]. 53 y/o M with PMHx of asthma presents to the ED c/o SOB and cough since this morning. Endorses chest pain after coughing fits. States that he feels like he has been having an asthma attack all day. States that he does not have an albuterol inhaler. Denies fever, chills.

## 2024-10-20 NOTE — ED STATDOCS - NSFOLLOWUPINSTRUCTIONS_ED_ALL_ED_FT
Seguimiento con el centro juan m   Use el inhalador de albuterol 2 inhalaciones cada 4 a 6 horas según sea necesario para la tos y las sibilancias.   Le administraron 1 dosis única de esteroides en la connie de emergencias.   Regrese al servicio de urgencias si algún síntoma empeora.            Ataque de asma  Asthma Attack  Upper body outline showing parts of the respiratory system, highlighting the bronchi.  El ataque de asma, también llamado exacerbación del asma o broncoespasmo izzy, es el estrechamiento y endurecimiento repentino de las vías respiratorias inferiores (bronquios) en los pulmones, que puede dificultar la respiración. El estrechamiento es causado por la inflamación y el endurecimiento del músculo liso que rodea las vías respiratorias inferiores en los pulmones.    Los ataques de asma pueden provocar tos, silbidos agudos al respirar, dinah siempre al exhalar (sibilancias), problemas para respirar (falta de aire) y dolor de pecho. Las vías respiratorias pueden producir mucosidad adicional causada por la inflamación y la irritación. Mary un ataque de asma, puede ser difícil respirar. Es importante recibir tratamiento de inmediato. Los ataques de asma pueden ser desde leves hasta potencialmente mortales.    ¿Cuáles son las causas?  Las posibles causas o desencadenantes de esta afección incluyen los siguientes:  Alérgenos domésticos, mac polvo, caspa de mascotas y cucarachas.  El moho y el polen de los árboles o el césped.  Las sustancias contaminantes del aire, mac los limpiadores del hogar, los aerosoles, los olores intensos y el humo de cualquier clase.  Cambios climáticos y aire frío.  El estrés y las emociones allen, mac llorar o reír intensamente.  El ejercicio o la actividad física que exige mucha energía.  Ciertos medicamentos o afecciones médicas, tales mac:  Aspirina o betabloqueantes.  Enfermedades infecciosas o inflamatorias, mac la gripe (influenza), el resfrío, la neumonía o la inflamación de las membranas nasales (rinitis).  Enfermedad de reflujo gastroesofágico (ERGE). La ERGE es erendira afección en la que el ácido estomacal vuelve al esófago y se derrama en la tráquea, lo que puede irritar las vías respiratorias.  ¿Cuáles son los signos o síntomas?  Los síntomas de esta afección incluyen:  Sibilancias.  Tos excesiva. [place] puede ocurrir solamente por la noche.  Opresión o dolor en el pecho.  Falta de aire.  Dificultad para enunciar oraciones completas.  Sensación de que no puede obtener el aire suficiente aunque verenice un esfuerzo por respirar (falta de aire).  ¿Cómo se diagnostica?  Esta afección se puede diagnosticar en función de lo siguiente:  Un examen físico y los antecedentes médicos.  Los síntomas.  Estudios para buscar otras causas de los síntomas u otras afecciones que puedan desencadenar el ataque de asma. Estas pruebas pueden incluir lo siguiente:  Erendira radiografía de tórax.  Análisis de kiera.  Estudios de la función pulmonar (espirometría) para evaluar el flujo de aire en los pulmones.  ¿Cómo se trata?  El tratamiento de esta afección depende de la intensidad y la causa del ataque de asma.  En el alli de los ataques leves, puede recibir medicamentos mediante un inhalador manual (inhalador con medidor de dosis, o IMD) o mediante un aparato que transforma el medicamento líquido en un vapor (nebulizador). Algunos de estos medicamentos son los siguientes:  Medicamentos de rescate o de alivio rápido que relajan velozmente las vías respiratorias y los pulmones.  Medicamentos de acción prolongada que se usan a diario para prevenir (controlar) los síntomas del asma.  En el alli de los ataques moderados o graves, es posible que lo traten con medicamentos con corticoesteroides por boca o mediante erendira inyección por vía intravenosa en el hospital.  En el alli de los ataques graves, puede necesitar oxigenoterapia o erendira máquina para respirar (respirador).  Si la causa del ataque de asma es erendira infección por erendira bacteria, le darán antibióticos.  Siga estas instrucciones en ramos casa:  Medicamentos    Use los medicamentos de venta ignacia y los recetados solamente mac se lo haya indicado el médico.  Si le recetaron un antibiótico, tómelo mac se lo haya indicado el médico. No deje de usar el antibiótico aunque comience a sentirse mejor.  Informe al médico si está embarazada o puede estar embarazada, para que jm se cerciore de que el medicamento para el asma pueda usarse con seguridad mary el embarazo.  Evite los factores desencadenantes    A sign showing that a person should not smoke.  Lleve un registro de las cosas que desencadenan los ataques de asma. Evite la exposición a estos desencadenantes.  No consuma ningún producto que contenga nicotina o tabaco. Estos productos incluyen cigarrillos, tabaco para mascar y aparatos de vapeo, mac los cigarrillos electrónicos. Si necesita ayuda para dejar de fumar, consulte al médico.  Cuando esté alto el nivel de polen, contaminación del aire o humedad, mantenga las ventanas cerradas y use el aire acondicionado o vaya a lugares con aire acondicionado.  Plan de acción para controlar el asma    Trabaje con el médico para elaborar un plan escrito para el control y el tratamiento de los ataques de asma (plan de acción para el asma). El plan debe incluir lo siguiente:  Erendira lista de los factores desencadenantes del asma y el modo de evitarlos.  Erendira lista de los síntomas que puede tener mary un ataque de asma.  Información sobre qué medicamento lucy, cuándo tomarlo y qué cantidad.  Información para ayudar a comprender las mediciones de flujo gil.  Medidas diarias que puede lucy para controlar los síntomas del asma.  Información de contacto para brice médicos.  Si tiene un ataque de asma, actúe con rapidez. Siga los pasos de emergencia de ramos plan escrito de acción para el asma. [place] puede hacer que no necesite ir al hospital.  Hable con un familiar o amigo cercano sobre ramos plan de acción para el asma y a quién contactar en alli de que necesite ayuda.  Instrucciones generales    Evite el ejercicio o la actividad física excesiva hasta que el ataque de asma se termine.  Manténgase al día con todas las vacunas, mac las vacunas antigripal y contra la neumonía.  Concurra a todas las visitas de seguimiento. [place] es importante.  Comuníquese con un médico si:  Ha seguido ramos plan de acción mary 1 hora y ramos lectura del flujo gil se mantiene entre el 50 % y el 79 % de ramos mejor valor. [place] se encuentra en la loida amarilla que significa “precaución”.  Necesita usar ramos medicamento de alivio rápido con mayor frecuencia que lo habitual.  Los medicamentos le causan efectos secundarios, mac erupción cutánea, picazón, hinchazón o dificultad para respirar.  Los síntomas no mejoran después de usar medicamentos.  Tiene fiebre.  Solicite ayuda de inmediato si:  Ramos flujo gil es josselin que el 50 % del mejor valor personal. [place] se encuentra en la loida vaibhav, lo que significa “peligro”.  Siente dolor o molestias en el pecho.  Los medicamentos ya no tienen el efecto deseado.  Al toser, elimina mucosidad con kiera.  Tiene fiebre, y los síntomas empeoran repentinamente.  Tiene dificultad para tragar.  Se siente muy cansado y respirar le provoca cansancio.  Estos síntomas pueden indicar erendira emergencia. Solicite ayuda de inmediato. Llame al 911.  No espere a elbert si los síntomas desaparecen.  No conduzca por brice propios medios hasta el hospital.  Resumen  La causa de los ataques de asma es el estrechamiento o la falta de espacio en las vías respiratorias, lo que causa falta de aire, tos y sibilancia.  Los factores desencadenantes de un ataque de asma pueden ser varios, tales mac alérgenos, cambios climáticos, actividad física, olores intensos y humo de cualquier clase.  Si tiene un ataque de asma, actúe con rapidez. Siga los pasos de emergencia de ramos plan escrito de acción para el asma.  Busque ayuda de inmediato si tiene erendira intensa dificultad para respirar, dolor en el pecho o fiebre, o si los medicamentos que tiene en ramos hogar ya no son eficaces para aliviar los síntomas.  Esta información no tiene mac fin reemplazar el consejo del médico. Asegúrese de hacerle al médico cualquier pregunta que tenga.

## 2024-10-20 NOTE — ED STATDOCS - PHYSICAL EXAMINATION
Constitutional: NAD  Eyes: EOMI  Head: Normocephalic atraumatic  Mouth: MMM  Cardiac: regular rate   Resp: unlabored breathing, no respiratory distress, speaking full sentences, diffusely wheezy, no accessory muscle use  GI: Abd s/nt/nd  Neuro: grossly normal and intact  Skin: No visible rashes

## 2024-10-20 NOTE — ED STATDOCS - PATIENT PORTAL LINK FT
You can access the FollowMyHealth Patient Portal offered by [facility] by registering at the following website: [email or web address]. By joining [facility]’s FollowMyHealth portal, you will also be able to view your health information using other applications (apps) compatible with our system.

## 2024-10-20 NOTE — ED STATDOCS - PROGRESS NOTE DETAILS
EMANI Frazier: labs and imaging reviewed, Pt still with b/l wheeze. duo neb x 2 ordered and will re eval EMANI Frazier: I participated in the care of this patient. I agree with the history, physical and plan. EMANI Frazier:  #[id number] Ashish used. pt feeling better and wheezing improved. Pt stable for dc with albuterol inhaler and will follow up with [facility]. Pt agrees with plan.

## 2024-10-20 NOTE — ED ADULT NURSE NOTE - OBJECTIVE STATEMENT
54yM AOx4 ambulatory, presents to  ED c/o chest tightness starting 3 days ago. PMH asthma and does not adhere to medication regimen. pt has auditory inspiratory and expiratory wheeze. pt states the SOB "wakes him up in the middle of the night". IV inserted, labs drawn and sent, medicated per EMAR, and place on cardiac monitor.

## 2024-10-30 ENCOUNTER — EMERGENCY (EMERGENCY)
Facility: HOSPITAL | Age: 54
LOS: 0 days | Discharge: HOME | End: 2024-10-31
Attending: EMERGENCY MEDICINE
Payer: MEDICAID

## 2024-10-30 VITALS
OXYGEN SATURATION: 92 % | HEART RATE: 110 BPM | WEIGHT: 123.46 LBS | SYSTOLIC BLOOD PRESSURE: 130 MMHG | RESPIRATION RATE: 22 BRPM | TEMPERATURE: 98 F | DIASTOLIC BLOOD PRESSURE: 87 MMHG

## 2024-10-30 DIAGNOSIS — J45.901 UNSPECIFIED ASTHMA WITH (ACUTE) EXACERBATION: ICD-10-CM

## 2024-10-30 DIAGNOSIS — R05.1 ACUTE COUGH: ICD-10-CM

## 2024-10-30 DIAGNOSIS — R06.2 WHEEZING: ICD-10-CM

## 2024-10-30 PROCEDURE — 94640 AIRWAY INHALATION TREATMENT: CPT

## 2024-10-30 PROCEDURE — 99284 EMERGENCY DEPT VISIT MOD MDM: CPT

## 2024-10-30 PROCEDURE — 93010 ELECTROCARDIOGRAM REPORT: CPT

## 2024-10-30 PROCEDURE — 99285 EMERGENCY DEPT VISIT HI MDM: CPT | Mod: 25

## 2024-10-30 PROCEDURE — 93005 ELECTROCARDIOGRAM TRACING: CPT

## 2024-10-30 RX ORDER — ALBUTEROL 90 MCG
2 AEROSOL (GRAM) INHALATION EVERY 6 HOURS
Refills: 0 | Status: DISCONTINUED | OUTPATIENT
Start: 2024-10-30 | End: 2024-10-31

## 2024-10-30 RX ORDER — IPRATROPIUM BROMIDE AND ALBUTEROL SULFATE .5; 3 MG/3ML; MG/3ML
3 SOLUTION RESPIRATORY (INHALATION) ONCE
Refills: 0 | Status: COMPLETED | OUTPATIENT
Start: 2024-10-30 | End: 2024-10-30

## 2024-10-30 RX ORDER — PREDNISONE 5 MG/1
60 TABLET ORAL ONCE
Refills: 0 | Status: COMPLETED | OUTPATIENT
Start: 2024-10-30 | End: 2024-10-30

## 2024-10-30 RX ADMIN — IPRATROPIUM BROMIDE AND ALBUTEROL SULFATE 3 MILLILITER(S): .5; 3 SOLUTION RESPIRATORY (INHALATION) at 23:11

## 2024-10-30 RX ADMIN — PREDNISONE 60 MILLIGRAM(S): 5 TABLET ORAL at 21:20

## 2024-10-30 RX ADMIN — Medication 2 PUFF(S): at 23:49

## 2024-10-30 RX ADMIN — IPRATROPIUM BROMIDE AND ALBUTEROL SULFATE 3 MILLILITER(S): .5; 3 SOLUTION RESPIRATORY (INHALATION) at 23:28

## 2024-10-30 RX ADMIN — IPRATROPIUM BROMIDE AND ALBUTEROL SULFATE 3 MILLILITER(S): .5; 3 SOLUTION RESPIRATORY (INHALATION) at 21:21

## 2024-10-30 NOTE — ED ADULT NURSE REASSESSMENT NOTE - NS ED NURSE REASSESS COMMENT FT1
Pt states he is feeling better after medications. Awaiting for MD to reassess.
Pt states he is feeling better. MD Denson at bedside states to wean pt off 2L nasal cannula. Pt sttaing 96-98% on room air.

## 2024-10-30 NOTE — ED ADULT NURSE NOTE - NSFALLUNIVINTERV_ED_ALL_ED
Bed/Stretcher in lowest position, wheels locked, appropriate side rails in place/Call bell, personal items and telephone in reach/Instruct patient to call for assistance before getting out of bed/chair/stretcher/Non-slip footwear applied when patient is off stretcher/[place] to call system/Physically safe environment - no spills, clutter or unnecessary equipment/Purposeful proactive rounding/Room/bathroom lighting operational, light cord in reach

## 2024-10-30 NOTE — ED ADULT TRIAGE NOTE - CHIEF COMPLAINT QUOTE
Pt ambulatory to ED c/o difficulty breathing x4 days. Pt denies cp, n/v/d. Denies use of medications or breathing treatments. PMHx asthma. Pt O2 92% on room air in triage. Denies sick contacts. Pt sent for EKG.

## 2024-10-30 NOTE — ED ADULT NURSE NOTE - OBJECTIVE STATEMENT
Pt presents to ed c/o of difficulty breathing. Pt endorses sob, difficulty breathing and chest pain. Pt states he has not been able to sleep well due to the difficulty of breathing. Pmhx of asthma pt states he recently ran out of his albuterol inhaler. Denies fevers, chills, nausea and vomiting at this time.

## 2024-10-31 VITALS
DIASTOLIC BLOOD PRESSURE: 75 MMHG | TEMPERATURE: 99 F | RESPIRATION RATE: 16 BRPM | HEART RATE: 95 BPM | OXYGEN SATURATION: 95 % | SYSTOLIC BLOOD PRESSURE: 106 MMHG

## 2024-10-31 RX ORDER — PREDNISONE 5 MG/1
3 TABLET ORAL
Qty: 15 | Refills: 0
Start: 2024-10-31 | End: 2024-11-04

## 2024-10-31 RX ORDER — ALBUTEROL 90 MCG
2 AEROSOL (GRAM) INHALATION
Qty: 2 | Refills: 0
Start: 2024-10-31 | End: 2024-11-13

## 2024-10-31 NOTE — ED PROVIDER NOTE - CLINICAL SUMMARY MEDICAL DECISION MAKING FREE TEXT BOX
pt with asthma will give albuterol and prednsione    pt remnains with wheezing will give additional nebulizers    pt feeling much better, states he cannot afford his medications will order from our pharmacy pt is able to be jer Denson DO

## 2024-10-31 NOTE — ED PROVIDER NOTE - PHYSICAL EXAMINATION
Gen:  Well appearing in NAD  Head:  NC/AT  HEENT: pupils perrl,no pharyngeal erythema, uvula midline  Cardiac: S1S2,tachy  Abd: Soft, non tender  Resp: +distress, decreased with bilateral wheezing  musculoskeletal:: no deformities, no swelling, strength +5/+5  Skin: warm and dry as visualized, no rashes  Neuro: MORAN, aao x 4  Psych:alert, cooperative, appropriate mood and affect for situation

## 2024-10-31 NOTE — ED PROVIDER NOTE - NSFOLLOWUPINSTRUCTIONS_ED_ALL_ED_FT
Asma en los adultos  Asthma, Adult  Outline of a person's upper body showing the lungs, with close-ups of two airways, one normal and one narrow.  El asma es erendira afección que causa la inflamación y el estrechamiento de las vías respiratorias. Las vías respiratorias son los conductos que van desde la nariz y la boca hasta los pulmones. Cuando los síntomas de asma se intensifican, se produce lo que se conoce mac ataque de asma o exacerbación. [place] puede dificultar la respiración. Las exacerbaciones del asma pueden ir de leves a potencialmente mortales. No hay erendira sridhar para el asma, carolina los medicamentos y los cambios en el estilo de chan pueden ayudar a controlar la enfermedad.    ¿Cuáles son las causas?  No se sabe con exactitud cuál es la causa del asma, carolina determinados factores pueden provocar la intensificación de los síntomas del asma (factores desencadenantes).    ¿Qué cosas pueden desencadenar un ataque de asma?    Humo del cigarrillo.  Moho.  Polvo.  Escamas de la piel de las mascotas (caspa).  Cucarachas.  Polen.  La contaminación del aire (mac limpiadores domésticos, humo de leña, ailyn tóxica u olores químicos).  ¿Cuáles son los signos o los síntomas?  Dificultad para respirar (falta de aire).  Tos.  Emitir sonidos de silbidos agudos al respirar, más a menudo al exhalar (sibilancias).  Opresión en el pecho.  Cansancio aunque se verenice poca actividad.  Escasa tolerancia a los ejercicios.  ¿Cómo se trata?  Medicamentos de control que ayudan a prevenir los síntomas del asma.  Medicamentos de alivio o de rescate de acción rápida. Estos brindan un alivio a corto plazo de los síntomas de asma.  Medicamentos para alergias si los ataques son ocasionados por alérgenos.  Medicamentos para ayudar a controlar el sistema de defensa (inmunitario) del organismo.  Mantenerse alejado de las cosas que causan los ataques de asma.  Siga estas instrucciones en ramos casa:  Cómo evitar desencadenantes en ramos hogar    No permita que nadie fume en ramos casa.  Limite el uso de hogares o estufas a leña.  Elimine las plagas (mac cucarachas, ratones) y brice excrementos.  Mantenga ramos casa limpia.  Limpie los pisos. Elimine el polvo regularmente. Use productos de limpieza que ximena inodoros.  Lave las sábanas y las mantas todas las semanas con [place]. Séquelas en erendira secadora.  Pídale a alguien que pase la aspiradora cuando usted no se encuentre en casa.  Cambie los filtros de la calefacción y del aire acondicionado con frecuencia.  Use mantas de poliéster o algodón.  Instrucciones generales    Use los medicamentos de venta ignacia y los recetados solamente mac se lo haya indicado el médico.  No fume ni consuma ningún producto que contenga nicotina o tabaco. Si necesita ayuda para dejar de fumar, consulte al médico.  Aléjese del humo de otros fumadores.  Evite realizar actividades al aire ignacia cuando la cantidad de alérgenos sea yoshi y la calidad del aire sea baja.  Entre en calor antes de hacer ejercicio. Dedique tiempo a enfriarse luego de realizar actividades físicas.  Use un espirómetro mac se lo haya indicado el médico. El espirómetro es erendira herramienta que mide el funcionamiento de los pulmones.  Lleve un registro de los valores del espirómetro. Anótelos.  Siga el plan de acción para el asma. Staci es erendira planificación por escrito para el control del asma y el tratamiento de los ataques.  Asegúrese de recibir todas las vacunas que el médico le recomiende. Consulte al médico sobre las vacunas para la gripe y la neumonía.  Concurra a todas las visitas de seguimiento.  Comuníquese con un médico si:  Tiene sibilancias, respira con dificultad o tose aun cuando helio medicamentos para prevenir ataques.  La mucosidad que expectora cuando tose (esputo) es más espesa que lo habitual.  La mucosidad que expectora cambia de trasparente o prajapati a amarillo, sarah, elsa o sanguinolenta.  Tiene trastornos a causa de los medicamentos que helio, por ejemplo:  Erupción cutánea.  Picazón.  Hinchazón.  Dificultad para respirar.  Necesita un medicamento de alivio más de 2 a 3 veces por semana.  El valor de flujo gil aún está entre el 50 y el 79 % del mejor valor personal después de seguir el plan de acción lilian 1 hora.  Tiene fiebre.  Solicite ayuda de inmediato si:  Parece empeorar y no responde al medicamento lilian un ataque de asma.  Le falta el aire, incluso en reposo.  Le falta el aire incluso cuando hace muy poca actividad física.  Tiene dificultad para comer, beber o hablar.  Siente dolor u opresión en el pecho.  Tiene latidos cardíacos acelerados.  Los labios o las uñas se le tornan azules.  Siente que está por desvanecerse, está mareado o se desmaya.  Ramos flujo gil es josselin que el 50 % del mejor valor personal.  Se siente demasiado cansado para respirar con normalidad.  Estos síntomas pueden indicar erendira emergencia. Solicite ayuda de inmediato. Llame al 911.  No espere a elbert si los síntomas desaparecen.  No conduzca por brice propios medios hasta el hospital.  Resumen  El asma es erendira afección a genna plazo (crónica) en la que las vías respiratorias se estrechan y se obstruyen. El ataque de asma puede causar dificultad para respirar.  El asma no puede curarse, carolina los medicamentos y los cambios en el estilo de chan lo ayudarán a controlar la enfermedad.  Asegúrese de comprender cómo evitar los desencadenantes y cómo y cuándo usar los medicamentos.  Evite las cosas que causan síntomas de alergia (alérgenos). Estas incluyen escamas de la piel de los animales (caspa) y el polen de los pastos o los árboles.  Evite las cosas que contaminan el aire. Entre ellas, productos de limpieza del hogar, humo de leña, esmog u olores químicos.  Esta información no tiene mac fin reemplazar el consejo del médico. Asegúrese de hacerle al médico cualquier pregunta que tenga.    Document Revised: 10/07/2022 Document Reviewed: 10/07/2022  [employer] Patient Education © 2024 [employer]  [employer] logo  Terms and Conditions  Privacy Policy  Editorial Policy  All content on this site: Copyright © 2024 Elsevier, its licensors, and contributors. All rights are reserved, including those for text and data mining, AI training, and similar technologies. For all open access content, the Creative Commons licensing terms apply.  Cookies are used by this site. [facility]

## 2024-10-31 NOTE — ED PROVIDER NOTE - PATIENT PORTAL LINK FT
You can access the FollowMyHealth Patient Portal offered by [facility] by registering at the following website: [email or web address]. By joining [employer]’s FollowMyHealth portal, you will also be able to view your health information using other applications (apps) compatible with our system.

## 2024-11-03 ENCOUNTER — INPATIENT (INPATIENT)
Facility: HOSPITAL | Age: 54
LOS: 3 days | Discharge: HOME | DRG: 141 | End: 2024-11-07
Attending: FAMILY MEDICINE | Admitting: STUDENT IN AN ORGANIZED HEALTH CARE EDUCATION/TRAINING PROGRAM
Payer: MEDICAID

## 2024-11-03 VITALS — HEIGHT: 64 IN | WEIGHT: 127.65 LBS

## 2024-11-03 PROCEDURE — 93010 ELECTROCARDIOGRAM REPORT: CPT

## 2024-11-03 PROCEDURE — 71045 X-RAY EXAM CHEST 1 VIEW: CPT | Mod: 26

## 2024-11-03 PROCEDURE — 99285 EMERGENCY DEPT VISIT HI MDM: CPT

## 2024-11-03 RX ORDER — METHYLPREDNISOLONE ACETATE 80 MG/ML
125 INJECTION, SUSPENSION INTRALESIONAL; INTRAMUSCULAR; INTRASYNOVIAL; SOFT TISSUE ONCE
Refills: 0 | Status: COMPLETED | OUTPATIENT
Start: 2024-11-03 | End: 2024-11-03

## 2024-11-03 RX ORDER — IPRATROPIUM BROMIDE AND ALBUTEROL SULFATE .5; 2.5 MG/3ML; MG/3ML
3 SOLUTION RESPIRATORY (INHALATION)
Refills: 0 | Status: COMPLETED | OUTPATIENT
Start: 2024-11-03 | End: 2024-11-04

## 2024-11-03 RX ADMIN — IPRATROPIUM BROMIDE AND ALBUTEROL SULFATE 3 MILLILITER(S): .5; 2.5 SOLUTION RESPIRATORY (INHALATION) at 23:51

## 2024-11-03 RX ADMIN — IPRATROPIUM BROMIDE AND ALBUTEROL SULFATE 3 MILLILITER(S): .5; 2.5 SOLUTION RESPIRATORY (INHALATION) at 23:29

## 2024-11-03 RX ADMIN — METHYLPREDNISOLONE ACETATE 125 MILLIGRAM(S): 80 INJECTION, SUSPENSION INTRALESIONAL; INTRAMUSCULAR; INTRASYNOVIAL; SOFT TISSUE at 23:51

## 2024-11-03 NOTE — ED PROVIDER NOTE - OBJECTIVE STATEMENT
Pt. is a 53 yo M with hx of asthma and alcohol abuse presents with 3 days of SOB, wheezing and chest tightness.  Patient states he was in the ED 3 days ago and was given meds which did not help.  Denies fever, coughing, vomiting.  States he has had asthma for many years and states this feels similar to his asthma.  States he cannot afford any prescription meds. Pt. is a 55 yo M with hx of asthma and alcohol abuse presents with 3 days of SOB, wheezing and chest tightness.  Patient states he was in the ED 3 days ago and was given meds which did not help.  Denies fever, vomiting, fainting.  States he has had asthma for many years and states this feels similar to his asthma.  +productive cough.  States he cannot afford any prescription meds. Meds were given to him from [facility] days ago but states medications are not working.

## 2024-11-03 NOTE — ED PROVIDER NOTE - CARE PLAN
1 Principal Discharge DX:	Acute asthma exacerbation  Secondary Diagnosis:	Community acquired pneumonia

## 2024-11-03 NOTE — ED ADULT TRIAGE NOTE - CHIEF COMPLAINT QUOTE
Pt ambulatory to ED w c/o sob and chest pain x3 days. PMH of asthma. Denies fever, chills, n/v. Respirations even and unlabored, speaking in complete sentences, O2 sat 92% on RA. Pt sent for EKG

## 2024-11-03 NOTE — ED PROVIDER NOTE - CLINICAL SUMMARY MEDICAL DECISION MAKING FREE TEXT BOX
55 yo M with hx of asthma , now with 3 days of acute asthma exacerbation, wheezing, SOB, chest tightness. EKG, CXR, labs, duoneb, IV steroids and will re-evaluate.

## 2024-11-04 DIAGNOSIS — J45.901 UNSPECIFIED ASTHMA WITH (ACUTE) EXACERBATION: ICD-10-CM

## 2024-11-04 PROBLEM — J45.909 UNSPECIFIED ASTHMA, UNCOMPLICATED: Chronic | Status: ACTIVE | Noted: 2024-10-31

## 2024-11-04 LAB
ALBUMIN SERPL ELPH-MCNC: 2.9 G/DL — LOW (ref 3.3–5)
ALBUMIN SERPL ELPH-MCNC: 3.2 G/DL — LOW (ref 3.3–5)
ALP SERPL-CCNC: 65 U/L — SIGNIFICANT CHANGE UP (ref 40–120)
ALP SERPL-CCNC: 90 U/L — SIGNIFICANT CHANGE UP (ref 40–120)
ALT FLD-CCNC: 12 U/L — SIGNIFICANT CHANGE UP (ref 12–78)
ALT FLD-CCNC: 16 U/L — SIGNIFICANT CHANGE UP (ref 12–78)
ANION GAP SERPL CALC-SCNC: 6 MMOL/L — SIGNIFICANT CHANGE UP (ref 5–17)
ANION GAP SERPL CALC-SCNC: 8 MMOL/L — SIGNIFICANT CHANGE UP (ref 5–17)
AST SERPL-CCNC: 13 U/L — LOW (ref 15–37)
AST SERPL-CCNC: 28 U/L — SIGNIFICANT CHANGE UP (ref 15–37)
BASOPHILS # BLD AUTO: 0.07 K/UL — SIGNIFICANT CHANGE UP (ref 0–0.2)
BASOPHILS NFR BLD AUTO: 0.5 % — SIGNIFICANT CHANGE UP (ref 0–2)
BILIRUB DIRECT SERPL-MCNC: 0.1 MG/DL — SIGNIFICANT CHANGE UP (ref 0–0.3)
BILIRUB INDIRECT FLD-MCNC: 0.1 MG/DL — LOW (ref 0.2–1)
BILIRUB SERPL-MCNC: 0.2 MG/DL — SIGNIFICANT CHANGE UP (ref 0.2–1.2)
BILIRUB SERPL-MCNC: 0.4 MG/DL — SIGNIFICANT CHANGE UP (ref 0.2–1.2)
BUN SERPL-MCNC: 4 MG/DL — LOW (ref 7–23)
BUN SERPL-MCNC: 5 MG/DL — LOW (ref 7–23)
CALCIUM SERPL-MCNC: 7.8 MG/DL — LOW (ref 8.5–10.1)
CALCIUM SERPL-MCNC: 8.4 MG/DL — LOW (ref 8.5–10.1)
CHLORIDE SERPL-SCNC: 108 MMOL/L — SIGNIFICANT CHANGE UP (ref 96–108)
CHLORIDE SERPL-SCNC: 119 MMOL/L — HIGH (ref 96–108)
CO2 SERPL-SCNC: 21 MMOL/L — LOW (ref 22–31)
CO2 SERPL-SCNC: 21 MMOL/L — LOW (ref 22–31)
CREAT SERPL-MCNC: 0.56 MG/DL — SIGNIFICANT CHANGE UP (ref 0.5–1.3)
CREAT SERPL-MCNC: 0.66 MG/DL — SIGNIFICANT CHANGE UP (ref 0.5–1.3)
D DIMER BLD IA.RAPID-MCNC: 164 NG/ML DDU — SIGNIFICANT CHANGE UP
EGFR: 113 ML/MIN/1.73M2 — SIGNIFICANT CHANGE UP
EGFR: 119 ML/MIN/1.73M2 — SIGNIFICANT CHANGE UP
EOSINOPHIL # BLD AUTO: 1.56 K/UL — HIGH (ref 0–0.5)
EOSINOPHIL NFR BLD AUTO: 10.7 % — HIGH (ref 0–6)
ETHANOL SERPL-MCNC: 345 MG/DL — HIGH (ref 0–10)
FLUAV AG NPH QL: SIGNIFICANT CHANGE UP
FLUBV AG NPH QL: SIGNIFICANT CHANGE UP
GLUCOSE SERPL-MCNC: 134 MG/DL — HIGH (ref 70–99)
GLUCOSE SERPL-MCNC: 98 MG/DL — SIGNIFICANT CHANGE UP (ref 70–99)
HCT VFR BLD CALC: 35.9 % — LOW (ref 39–50)
HGB BLD-MCNC: 12.3 G/DL — LOW (ref 13–17)
IMM GRANULOCYTES NFR BLD AUTO: 0.5 % — SIGNIFICANT CHANGE UP (ref 0–0.9)
LACTATE SERPL-SCNC: 2.3 MMOL/L — HIGH (ref 0.7–2)
LACTATE SERPL-SCNC: 2.4 MMOL/L — HIGH (ref 0.7–2)
LYMPHOCYTES # BLD AUTO: 15 % — SIGNIFICANT CHANGE UP (ref 13–44)
LYMPHOCYTES # BLD AUTO: 2.2 K/UL — SIGNIFICANT CHANGE UP (ref 1–3.3)
MAGNESIUM SERPL-MCNC: 1.9 MG/DL — SIGNIFICANT CHANGE UP (ref 1.6–2.6)
MAGNESIUM SERPL-MCNC: 2.2 MG/DL — SIGNIFICANT CHANGE UP (ref 1.6–2.6)
MCHC RBC-ENTMCNC: 30.7 PG — SIGNIFICANT CHANGE UP (ref 27–34)
MCHC RBC-ENTMCNC: 34.3 G/DL — SIGNIFICANT CHANGE UP (ref 32–36)
MCV RBC AUTO: 89.5 FL — SIGNIFICANT CHANGE UP (ref 80–100)
MONOCYTES # BLD AUTO: 1.2 K/UL — HIGH (ref 0–0.9)
MONOCYTES NFR BLD AUTO: 8.2 % — SIGNIFICANT CHANGE UP (ref 2–14)
NEUTROPHILS # BLD AUTO: 9.51 K/UL — HIGH (ref 1.8–7.4)
NEUTROPHILS NFR BLD AUTO: 65.1 % — SIGNIFICANT CHANGE UP (ref 43–77)
NT-PROBNP SERPL-SCNC: 60 PG/ML — SIGNIFICANT CHANGE UP (ref 0–125)
PHOSPHATE SERPL-MCNC: 1.7 MG/DL — LOW (ref 2.5–4.5)
PLATELET # BLD AUTO: 340 K/UL — SIGNIFICANT CHANGE UP (ref 150–400)
POTASSIUM SERPL-MCNC: 3.4 MMOL/L — LOW (ref 3.5–5.3)
POTASSIUM SERPL-MCNC: 3.7 MMOL/L — SIGNIFICANT CHANGE UP (ref 3.5–5.3)
POTASSIUM SERPL-SCNC: 3.4 MMOL/L — LOW (ref 3.5–5.3)
POTASSIUM SERPL-SCNC: 3.7 MMOL/L — SIGNIFICANT CHANGE UP (ref 3.5–5.3)
PROT SERPL-MCNC: 6.8 GM/DL — SIGNIFICANT CHANGE UP (ref 6–8.3)
PROT SERPL-MCNC: 7.8 GM/DL — SIGNIFICANT CHANGE UP (ref 6–8.3)
RBC # BLD: 4.01 M/UL — LOW (ref 4.2–5.8)
RBC # FLD: 16.2 % — HIGH (ref 10.3–14.5)
RSV RNA NPH QL NAA+NON-PROBE: SIGNIFICANT CHANGE UP
SARS-COV-2 RNA SPEC QL NAA+PROBE: SIGNIFICANT CHANGE UP
SODIUM SERPL-SCNC: 137 MMOL/L — SIGNIFICANT CHANGE UP (ref 135–145)
SODIUM SERPL-SCNC: 146 MMOL/L — HIGH (ref 135–145)
TROPONIN I, HIGH SENSITIVITY RESULT: 3.95 NG/L — SIGNIFICANT CHANGE UP
WBC # BLD: 14.62 K/UL — HIGH (ref 3.8–10.5)
WBC # FLD AUTO: 14.62 K/UL — HIGH (ref 3.8–10.5)

## 2024-11-04 PROCEDURE — 80048 BASIC METABOLIC PNL TOTAL CA: CPT

## 2024-11-04 PROCEDURE — 87040 BLOOD CULTURE FOR BACTERIA: CPT

## 2024-11-04 PROCEDURE — 83605 ASSAY OF LACTIC ACID: CPT

## 2024-11-04 PROCEDURE — 71045 X-RAY EXAM CHEST 1 VIEW: CPT

## 2024-11-04 PROCEDURE — 80076 HEPATIC FUNCTION PANEL: CPT

## 2024-11-04 PROCEDURE — 71250 CT THORAX DX C-: CPT | Mod: 26,MC

## 2024-11-04 PROCEDURE — 99223 1ST HOSP IP/OBS HIGH 75: CPT

## 2024-11-04 PROCEDURE — 84100 ASSAY OF PHOSPHORUS: CPT

## 2024-11-04 PROCEDURE — 0241U: CPT

## 2024-11-04 PROCEDURE — 85027 COMPLETE CBC AUTOMATED: CPT

## 2024-11-04 PROCEDURE — 94640 AIRWAY INHALATION TREATMENT: CPT

## 2024-11-04 PROCEDURE — 83735 ASSAY OF MAGNESIUM: CPT

## 2024-11-04 PROCEDURE — 80053 COMPREHEN METABOLIC PANEL: CPT

## 2024-11-04 PROCEDURE — 36415 COLL VENOUS BLD VENIPUNCTURE: CPT

## 2024-11-04 RX ORDER — ACETAMINOPHEN 500 MG
650 TABLET ORAL EVERY 6 HOURS
Refills: 0 | Status: DISCONTINUED | OUTPATIENT
Start: 2024-11-04 | End: 2024-11-07

## 2024-11-04 RX ORDER — ALBUTEROL 90 MCG
4 AEROSOL (GRAM) INHALATION EVERY 6 HOURS
Refills: 0 | Status: DISCONTINUED | OUTPATIENT
Start: 2024-11-04 | End: 2024-11-05

## 2024-11-04 RX ORDER — LORAZEPAM 2 MG
TABLET ORAL
Refills: 0 | Status: DISCONTINUED | OUTPATIENT
Start: 2024-11-05 | End: 2024-11-06

## 2024-11-04 RX ORDER — SODIUM CHLORIDE 9 MG/ML
500 INJECTION, SOLUTION INTRAMUSCULAR; INTRAVENOUS; SUBCUTANEOUS ONCE
Refills: 0 | Status: COMPLETED | OUTPATIENT
Start: 2024-11-04 | End: 2024-11-04

## 2024-11-04 RX ORDER — DEXTROMETHORPHAN HBR. AND GUAIFENESIN 20; 200 MG/10ML; MG/10ML
10 SOLUTION ORAL EVERY 6 HOURS
Refills: 0 | Status: DISCONTINUED | OUTPATIENT
Start: 2024-11-04 | End: 2024-11-07

## 2024-11-04 RX ORDER — AZITHROMYCIN DIHYDRATE 200 MG/5ML
500 POWDER, FOR SUSPENSION ORAL ONCE
Refills: 0 | Status: COMPLETED | OUTPATIENT
Start: 2024-11-04 | End: 2024-11-04

## 2024-11-04 RX ORDER — CEFTRIAXONE SODIUM 10 G
1000 VIAL (EA) INJECTION ONCE
Refills: 0 | Status: COMPLETED | OUTPATIENT
Start: 2024-11-04 | End: 2024-11-04

## 2024-11-04 RX ORDER — FLUTICASONE PROPIONATE AND SALMETEROL XINAFOATE 230; 21 UG/1; UG/1
1 AEROSOL, METERED RESPIRATORY (INHALATION)
Refills: 0 | Status: DISCONTINUED | OUTPATIENT
Start: 2024-11-04 | End: 2024-11-05

## 2024-11-04 RX ORDER — LORAZEPAM 2 MG
1.5 TABLET ORAL EVERY 4 HOURS
Refills: 0 | Status: DISCONTINUED | OUTPATIENT
Start: 2024-11-05 | End: 2024-11-05

## 2024-11-04 RX ORDER — LORAZEPAM 2 MG
2 TABLET ORAL
Refills: 0 | Status: DISCONTINUED | OUTPATIENT
Start: 2024-11-04 | End: 2024-11-07

## 2024-11-04 RX ORDER — ALBUTEROL 90 MCG
2.5 AEROSOL (GRAM) INHALATION ONCE
Refills: 0 | Status: COMPLETED | OUTPATIENT
Start: 2024-11-04 | End: 2024-11-04

## 2024-11-04 RX ORDER — ONDANSETRON HYDROCHLORIDE 2 MG/ML
4 INJECTION, SOLUTION INTRAMUSCULAR; INTRAVENOUS EVERY 6 HOURS
Refills: 0 | Status: DISCONTINUED | OUTPATIENT
Start: 2024-11-04 | End: 2024-11-07

## 2024-11-04 RX ORDER — THIAMINE HCL 100 MG
100 TABLET ORAL ONCE
Refills: 0 | Status: COMPLETED | OUTPATIENT
Start: 2024-11-04 | End: 2024-11-04

## 2024-11-04 RX ORDER — LORAZEPAM 2 MG
1 TABLET ORAL EVERY 4 HOURS
Refills: 0 | Status: DISCONTINUED | OUTPATIENT
Start: 2024-11-06 | End: 2024-11-06

## 2024-11-04 RX ORDER — CEFTRIAXONE SODIUM 10 G
1000 VIAL (EA) INJECTION EVERY 24 HOURS
Refills: 0 | Status: DISCONTINUED | OUTPATIENT
Start: 2024-11-05 | End: 2024-11-07

## 2024-11-04 RX ORDER — METHYLPREDNISOLONE ACETATE 80 MG/ML
40 INJECTION, SUSPENSION INTRALESIONAL; INTRAMUSCULAR; INTRASYNOVIAL; SOFT TISSUE EVERY 12 HOURS
Refills: 0 | Status: DISCONTINUED | OUTPATIENT
Start: 2024-11-04 | End: 2024-11-06

## 2024-11-04 RX ORDER — SODIUM CHLORIDE 9 MG/ML
2000 INJECTION, SOLUTION INTRAMUSCULAR; INTRAVENOUS; SUBCUTANEOUS ONCE
Refills: 0 | Status: COMPLETED | OUTPATIENT
Start: 2024-11-04 | End: 2024-11-04

## 2024-11-04 RX ORDER — LORAZEPAM 2 MG
2 TABLET ORAL EVERY 4 HOURS
Refills: 0 | Status: DISCONTINUED | OUTPATIENT
Start: 2024-11-04 | End: 2024-11-05

## 2024-11-04 RX ORDER — B-COMPLEX WITH VITAMIN C
1 VIAL (ML) INJECTION DAILY
Refills: 0 | Status: DISCONTINUED | OUTPATIENT
Start: 2024-11-04 | End: 2024-11-07

## 2024-11-04 RX ORDER — MAGNESIUM, ALUMINUM HYDROXIDE 200-200 MG
30 TABLET,CHEWABLE ORAL EVERY 4 HOURS
Refills: 0 | Status: DISCONTINUED | OUTPATIENT
Start: 2024-11-04 | End: 2024-11-07

## 2024-11-04 RX ORDER — SENNA 187 MG
2 TABLET ORAL AT BEDTIME
Refills: 0 | Status: DISCONTINUED | OUTPATIENT
Start: 2024-11-04 | End: 2024-11-07

## 2024-11-04 RX ORDER — TIOTROPIUM BROMIDE INHALATION SPRAY 1.56 UG/1
2 SPRAY, METERED RESPIRATORY (INHALATION) DAILY
Refills: 0 | Status: DISCONTINUED | OUTPATIENT
Start: 2024-11-04 | End: 2024-11-05

## 2024-11-04 RX ORDER — THIAMINE HCL 100 MG
100 TABLET ORAL DAILY
Refills: 0 | Status: COMPLETED | OUTPATIENT
Start: 2024-11-04 | End: 2024-11-06

## 2024-11-04 RX ORDER — SODIUM CHLORIDE 9 MG/ML
1000 INJECTION, SOLUTION INTRAMUSCULAR; INTRAVENOUS; SUBCUTANEOUS
Refills: 0 | Status: DISCONTINUED | OUTPATIENT
Start: 2024-11-04 | End: 2024-11-04

## 2024-11-04 RX ORDER — INFLUENZ VIR VAC TV P-SURF2003 15MCG/.5ML
0.5 SYRINGE (ML) INTRAMUSCULAR ONCE
Refills: 0 | Status: DISCONTINUED | OUTPATIENT
Start: 2024-11-04 | End: 2024-11-07

## 2024-11-04 RX ORDER — FOLIC ACID 1 MG/1
1 TABLET ORAL DAILY
Refills: 0 | Status: DISCONTINUED | OUTPATIENT
Start: 2024-11-04 | End: 2024-11-07

## 2024-11-04 RX ORDER — POTASSIUM CHLORIDE 10 MEQ
40 TABLET, EXTENDED RELEASE ORAL ONCE
Refills: 0 | Status: COMPLETED | OUTPATIENT
Start: 2024-11-04 | End: 2024-11-04

## 2024-11-04 RX ORDER — AZITHROMYCIN DIHYDRATE 200 MG/5ML
500 POWDER, FOR SUSPENSION ORAL EVERY 24 HOURS
Refills: 0 | Status: DISCONTINUED | OUTPATIENT
Start: 2024-11-05 | End: 2024-11-06

## 2024-11-04 RX ORDER — MAGNESIUM SULFATE IN 0.9% NACL 2 G/50 ML
2 INTRAVENOUS SOLUTION, PIGGYBACK (ML) INTRAVENOUS ONCE
Refills: 0 | Status: COMPLETED | OUTPATIENT
Start: 2024-11-04 | End: 2024-11-04

## 2024-11-04 RX ADMIN — Medication 2 MILLIGRAM(S): at 08:20

## 2024-11-04 RX ADMIN — Medication 1000 MILLIGRAM(S): at 01:59

## 2024-11-04 RX ADMIN — Medication 100 MILLIGRAM(S): at 10:41

## 2024-11-04 RX ADMIN — ONDANSETRON HYDROCHLORIDE 4 MILLIGRAM(S): 2 INJECTION, SOLUTION INTRAMUSCULAR; INTRAVENOUS at 16:51

## 2024-11-04 RX ADMIN — Medication 100 MILLIGRAM(S): at 05:52

## 2024-11-04 RX ADMIN — METHYLPREDNISOLONE ACETATE 40 MILLIGRAM(S): 80 INJECTION, SUSPENSION INTRALESIONAL; INTRAMUSCULAR; INTRASYNOVIAL; SOFT TISSUE at 10:39

## 2024-11-04 RX ADMIN — Medication 1 TABLET(S): at 10:40

## 2024-11-04 RX ADMIN — SODIUM CHLORIDE 500 MILLILITER(S): 9 INJECTION, SOLUTION INTRAMUSCULAR; INTRAVENOUS; SUBCUTANEOUS at 07:54

## 2024-11-04 RX ADMIN — SODIUM CHLORIDE 125 MILLILITER(S): 9 INJECTION, SOLUTION INTRAMUSCULAR; INTRAVENOUS; SUBCUTANEOUS at 03:33

## 2024-11-04 RX ADMIN — SODIUM CHLORIDE 2000 MILLILITER(S): 9 INJECTION, SOLUTION INTRAMUSCULAR; INTRAVENOUS; SUBCUTANEOUS at 01:52

## 2024-11-04 RX ADMIN — Medication 40 MILLIEQUIVALENT(S): at 10:39

## 2024-11-04 RX ADMIN — FOLIC ACID 1 MILLIGRAM(S): 1 TABLET ORAL at 10:40

## 2024-11-04 RX ADMIN — TIOTROPIUM BROMIDE INHALATION SPRAY 2 PUFF(S): 1.56 SPRAY, METERED RESPIRATORY (INHALATION) at 10:41

## 2024-11-04 RX ADMIN — Medication 2 MILLIGRAM(S): at 04:35

## 2024-11-04 RX ADMIN — Medication 2 MILLIGRAM(S): at 20:44

## 2024-11-04 RX ADMIN — Medication 75 MILLILITER(S): at 13:55

## 2024-11-04 RX ADMIN — Medication 4 PUFF(S): at 20:47

## 2024-11-04 RX ADMIN — Medication 2 MILLIGRAM(S): at 16:50

## 2024-11-04 RX ADMIN — IPRATROPIUM BROMIDE AND ALBUTEROL SULFATE 3 MILLILITER(S): .5; 2.5 SOLUTION RESPIRATORY (INHALATION) at 00:14

## 2024-11-04 RX ADMIN — Medication 150 GRAM(S): at 05:52

## 2024-11-04 RX ADMIN — METHYLPREDNISOLONE ACETATE 40 MILLIGRAM(S): 80 INJECTION, SUSPENSION INTRALESIONAL; INTRAMUSCULAR; INTRASYNOVIAL; SOFT TISSUE at 23:01

## 2024-11-04 RX ADMIN — AZITHROMYCIN DIHYDRATE 255 MILLIGRAM(S): 200 POWDER, FOR SUSPENSION ORAL at 01:59

## 2024-11-04 RX ADMIN — Medication 2.5 MILLIGRAM(S): at 10:15

## 2024-11-04 RX ADMIN — Medication 650 MILLIGRAM(S): at 16:50

## 2024-11-04 RX ADMIN — Medication 2 MILLIGRAM(S): at 12:27

## 2024-11-04 NOTE — DIETITIAN INITIAL EVALUATION ADULT - WEIGHT (LBS)
103 CXR with perihilar opacities, likely cardiogenic. BNP elevated. No formal diagnosis of CHF. Chronic BLE edema per daughter. Prior echo 10/23 with EF 61%, repeat 63% with mild diastolic dysfunction  -s/p IV Lasix 40mg, c/w IV Bumex 2mg BID  -goal diuresis 1-1.5L daily
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
## 2024-11-04 NOTE — DIETITIAN INITIAL EVALUATION ADULT - ORAL INTAKE PTA/DIET HISTORY
Pt unarousable at time of RD interview. Unable to obtain intake/diet history. Cheek Interpolation Flap Division And Inset Text: Division and inset of the cheek interpolation flap was performed to achieve optimal aesthetic result, restore normal anatomic appearance and avoid distortion of normal anatomy, expedite and facilitate wound healing, achieve optimal functional result and because linear closure either not possible or would produce suboptimal result. The patient was prepped and draped in the usual manner. The pedicle was infiltrated with local anesthesia. The pedicle was sectioned with a #15 blade. The pedicle was de-bulked and trimmed to match the shape of the defect. Hemostasis was achieved. The flap donor site and free margin of the flap were secured with deep buried sutures and the wound edges were re-approximated.

## 2024-11-04 NOTE — H&P ADULT - NSHPPHYSICALEXAM_GEN_ALL_CORE
Vital Signs Last 24 Hrs  T(C): 36.8 (03 Nov 2024 23:20), Max: 36.8 (03 Nov 2024 23:20)  T(F): 98.3 (03 Nov 2024 23:20), Max: 98.3 (03 Nov 2024 23:20)  HR: 90 (04 Nov 2024 01:55) (90 - 109)  BP: 98/64 (04 Nov 2024 01:55) (98/64 - 117/88)  BP(mean): 75 (04 Nov 2024 01:55) (75 - 97)  RR: 18 (04 Nov 2024 01:55) (18 - 19)  SpO2: 95% (04 Nov 2024 01:55) (93% - 95%)    Parameters below as of 04 Nov 2024 01:55  Patient On (Oxygen Delivery Method): room air

## 2024-11-04 NOTE — DIETITIAN INITIAL EVALUATION ADULT - ETIOLOGY
r/t decreased ability to meet increased nutrient needs 2/2 acute asthma exacerbation, ETOH abuse, community acquired pna

## 2024-11-04 NOTE — ED ADULT NURSE NOTE - FINAL NURSING ELECTRONIC SIGNATURE
[facility]   Spoke with pt's son and scheduled appt with Dr Parks for 10/1 at 3:20.  Patient accepted appt and VU of date/time/location.    Oncology Navigation   Intake  Cancer Type: Cancer of Unknown Origin/Diagnosis Clinic  Type of Referral: Internal  Date of Referral: 09/30/24  Initial Nurse Navigator Contact: 09/30/24  Referral to Initial Contact Timeline (days): 0  First Appointment Available: 10/01/24  Appointment Date: 10/01/24  First Available Date vs. Scheduled Date (days): 0     Treatment  Current Status: Staging work-up             Procedures: Bone scan  Bone Scan Schedule Date: 09/19/24                 Acuity      Follow Up  No follow-ups on file.       
04-Nov-2024 04:01

## 2024-11-04 NOTE — ED ADULT NURSE NOTE - OBJECTIVE STATEMENT
55 y/o male awake alert and oriented x4 presents to ED c/o SOB, wheezing and chest tightness x 3 days. Pt was seen in [facility] days ago for similar sx and given meds with no relief. Denies coughing, fever/chills, vomiting, abd pain. Pt states he has difficulty picking up meds from pharmacy.

## 2024-11-04 NOTE — SBIRT NOTE ADULT - NSSBIRTALCPOSREINDET_GEN_A_CORE
Pt feels he can control his drinking and does not need a referral to a ETOH program. Pt accepted addiction folder.

## 2024-11-04 NOTE — DIETITIAN INITIAL EVALUATION ADULT - PERTINENT LABORATORY DATA
11-04    146[H]  |  119[H]  |  4[L]  ----------------------------<  134[H]  3.4[L]   |  21[L]  |  0.56    Ca    7.8[L]      04 Nov 2024 05:03  Phos  1.7     11-04  Mg     1.9     11-04    TPro  6.8  /  Alb  2.9[L]  /  TBili  0.2  /  DBili  0.1  /  AST  13[L]  /  ALT  12  /  AlkPhos  65  11-04

## 2024-11-04 NOTE — PATIENT PROFILE ADULT - FUNCTIONAL ASSESSMENT - DAILY ACTIVITY 6.
CC:  Chief Complaint   Patient presents with   • Sinus Problem     [id number]   • Shoulder Pain     R x1week, pain travel to finger tip   • Rectal Bleeding     Poss Hemorrhoids, x3mons       HISTORY OF PRESENT ILLNESS: Patient is a 68 y.o. female established patient presenting sinus congestion, arm pain, hemorrhoids      Sinus congestion  Onset 3 weeks ago  Congestion, teeth hurt, pain under eyes and above eyes, right ear fullness   History of sinus infections  Treatment- saline nasal spray, tylenol, used mucinex but didn't note changes  Denies chills, fevers    Right arm pain  Onset over the weekend  Pain to right arm at the top and down to fingers  Thinks she pinched a nerve  Was playing with grandson and had to push a large toy car that the battery  in from a field to the house, she was straining her neck while push car  Treatment- tylenol       Other hemorrhoids  Chronic and stable condition   Onset- 2-3 months  Not sure if it is fissure or hemorrhoid  Noticed bleeding on toilet paper,   Has used tucks, preparation H  Denies blood in stool, blood in toilet, constipation      Patient Active Problem List    Diagnosis Date Noted   • Right arm pain 2022   • Other hemorrhoids 2022   • Sinus congestion 2022   • Elevated low density lipoprotein (LDL) cholesterol level 2021   • Acute left-sided low back pain with left-sided sciatica 2021   • Chronic pain of left knee 2021   • Chronic gastric ulcer without hemorrhage and without perforation 2021   • Recurrent UTI 2011      Allergies:Hydrocodone-acetaminophen    Current Outpatient Medications   Medication Sig Dispense Refill   • ibuprofen (MOTRIN) 800 MG Tab Take 1 Tablet by mouth every 8 hours as needed for Moderate Pain. 30 Tablet 0   • amoxicillin (AMOXIL) 875 MG tablet Take 1 Tablet by mouth 2 times a day. 14 Tablet 0   • pantoprazole (PROTONIX) 20 MG tablet Take 1 Tablet by mouth every day. 90 Tablet 3   • vitamin D3  "(CHOLECALCIFEROL) 1000 Unit (25 mcg) Tab Take 1,000 Units by mouth every day.     • Multiple Vitamins-Minerals (MULTIVITAMIN ADULT PO) Take 1 Tab by mouth every day.       No current facility-administered medications for this visit.       Social History     Tobacco Use   • Smoking status: Never Smoker   • Smokeless tobacco: Never Used   Vaping Use   • Vaping Use: Never used   Substance Use Topics   • Alcohol use: Yes     Comment: occ   • Drug use: Not Currently     Social History     Social History Narrative   • Not on file       Family History   Problem Relation Age of Onset   • Psychiatric Illness Mother    • Cancer Father    • Heart Disease Father    • Cancer Sister    • Diabetes Son         Review of Systems   Constitutional: Negative for chills, fever, malaise/fatigue and weight loss.   HENT: Positive for congestion, ear pain and sinus pain. Negative for hearing loss, sore throat and tinnitus.         Right ear fullness   Respiratory: Negative for shortness of breath.    Cardiovascular: Negative for chest pain.   Gastrointestinal: Positive for constipation. Negative for abdominal pain, blood in stool, diarrhea, nausea and vomiting.   Musculoskeletal: Positive for joint pain and neck pain. Negative for falls.   Skin: Negative.    Neurological: Negative for dizziness and headaches.             - NOTE: All other systems reviewed and are negative, except as in HPI.      Exam:    /74   Pulse 82   Temp 37.1 °C (98.8 °F) (Temporal)   Resp 18   Ht 1.575 m (5' 2\")   Wt 64.9 kg (143 lb)   SpO2 95%  Body mass index is 26.16 kg/m².    General: Alert, pleasant, NAD  EYES:   PERRL, EOMI, no icterus or pallor.  Conjunctivae and lids normal.   HENT:  Normocephalic.  External ears normal. Tympanic membranes pearly, opaque. Pain with palpation.  No nasal drainage present.  Oropharynx non-erythematous, mucous membranes moist. Frontal, maxillary pain. Neck supple.   No thyromegaly or masses palpated. No cervical or " supraclavicular lymphadenopathy.  Heart: Regular rate and rhythm.  S1 and S2 normal.  No murmurs appreciated.  Respiratory: Normal respiratory effort.  Clear to auscultation bilaterally.  Abdomen: Non-distended, soft, non-tender, no guarding/rebound. Bowel sounds present.  No hepatosplenomegaly.  No hernias.  Skin: Warm, dry, no rashes.  Musculoskeletal: Gait is normal.  Moves all extremities well.  Negative Spurling sign  Extremities: No clubbing, cyanosis or edema noted.   Neurological: No tremors, sensation grossly intact, tone/strength normal, gait is normal, CN2-12 intact.  Psych:  Affect/mood is normal, judgement is good, memory is intact, grooming is appropriate.    Assessment/Plan:  1. Right arm pain  - ibuprofen (MOTRIN) 800 MG Tab; Take 1 Tablet by mouth every 8 hours as needed for Moderate Pain.  Dispense: 30 Tablet; Refill: 0  Possible cervical radiculopathy but negative Spurling exam  Will continue to monitor and trial NSAIDs    2. Other hemorrhoids  Continue with preparation h, tucks, limit toilet sitting for 3 minutes to reduce worsening of hemorrhoids.   Continue to monitor, if worsens may need referral to GI    3. Sinus congestion  - ibuprofen (MOTRIN) 800 MG Tab; Take 1 Tablet by mouth every 8 hours as needed for Moderate Pain.  Dispense: 30 Tablet; Refill: 0  - amoxicillin (AMOXIL) 875 MG tablet; Take 1 Tablet by mouth 2 times a day.  Dispense: 14 Tablet; Refill: 0  Return to clinic for worsening symptoms       Discussed with patient possible alternative diagnoses, patient is to take all medications as prescribed.     If symptoms persist FU w/PCP, if symptoms worsen go to emergency room.     If experiencing any side effects from prescribed medications reports to the office immediately or go to emergency room.    Reviewed indication, dosage, usage and potential adverse effects of prescribed medications.     Reviewed risks and benefits of treatment plan. Patient verbalizes understanding of all  instruction and verbally agrees to plan.      Return in about 4 weeks (around 3/29/2022) for lab follow up and follow up on arm pain, sinus and hemorrhoids.    My total time spent caring for the patient on the day of the encounter was 25 minutes.   This does not include time spent on separately billable procedures/tests.     Please note that this dictation was created using voice recognition software. I have made every reasonable attempt to correct obvious errors, but I expect that there are errors of grammar and possibly content that I did not discover before finalizing the note.   4 = No assist / stand by assistance

## 2024-11-04 NOTE — DIETITIAN INITIAL EVALUATION ADULT - PERTINENT MEDS FT
MEDICATIONS  (STANDING):  azithromycin  IVPB 500 milliGRAM(s) IV Intermittent every 24 hours  cefTRIAXone Injectable. 1000 milliGRAM(s) IV Push every 24 hours  dextrose 5%. 1000 milliLiter(s) (75 mL/Hr) IV Continuous <Continuous>  fluticasone propionate/ salmeterol 250-50 MICROgram(s) Diskus 1 Dose(s) Inhalation two times a day  folic acid 1 milliGRAM(s) Oral daily  influenza   Vaccine 0.5 milliLiter(s) IntraMuscular once  LORazepam   Injectable   IV Push   LORazepam   Injectable 2 milliGRAM(s) IV Push every 4 hours  methylPREDNISolone sodium succinate Injectable 40 milliGRAM(s) IV Push every 12 hours  multivitamin 1 Tablet(s) Oral daily  thiamine 100 milliGRAM(s) Oral daily  tiotropium 2.5 MICROgram(s) Inhaler 2 Puff(s) Inhalation daily    MEDICATIONS  (PRN):  acetaminophen     Tablet .. 650 milliGRAM(s) Oral every 6 hours PRN Mild Pain (1 - 3)  albuterol    90 MICROgram(s) HFA Inhaler 4 Puff(s) Inhalation every 6 hours PRN Shortness of Breath and/or Wheezing  aluminum hydroxide/magnesium hydroxide/simethicone Suspension 30 milliLiter(s) Oral every 4 hours PRN Dyspepsia  guaifenesin/dextromethorphan Oral Liquid 10 milliLiter(s) Oral every 6 hours PRN Cough  LORazepam   Injectable 2 milliGRAM(s) IV Push every 1 hour PRN Symptom-triggered: each CIWA -Ar score 8 or GREATER  ondansetron Injectable 4 milliGRAM(s) IV Push every 6 hours PRN Nausea and/or Vomiting  senna 2 Tablet(s) Oral at bedtime PRN Constipation
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
## 2024-11-04 NOTE — PROGRESS NOTE ADULT - ASSESSMENT
54 year old male w asthma prior hx intubation, alcohol abuse, traumatic SAH 2019 came to ED c/o SOB x 3 days.     # Multifocal pneumonia with bronchospasm  - Actively wheezing  - Continue solumedrol  - Albuterol  - Continue advair and spiriva  - On rocephin and azithromycin  - FU cultures    # Etoh abuse  - Continue ciwa protocol high risk  - Ativan taper  - Continue thiamine  - Continue folic acid    # Hypokalemia  - replete    # DVT prophylaxis  - Low risk    54 year old male w asthma prior hx intubation, alcohol abuse, traumatic SAH 2019 came to ED c/o SOB x 3 days.     # Multifocal pneumonia with bronchospasm  - Actively wheezing  - Continue solumedrol  - Albuterol  - Continue advair and spiriva  - On rocephin and azithromycin  - FU cultures    # Etoh abuse  - Continue ciwa protocol high risk  - Ativan taper  - Continue thiamine  - Continue folic acid    # Hypokalemia  - replete    # Hypernatremia  - D5W for now     # DVT prophylaxis  - Low risk    54 year old male w asthma prior hx intubation, alcohol abuse, traumatic SAH 2019 came to ED c/o SOB x 3 days.     # Multifocal pneumonia with bronchospasm  - Actively wheezing  - Continue solumedrol  - Albuterol  - Continue advair and spiriva  - On rocephin and azithromycin  - FU cultures    # Etoh abuse  - Continue ciwa protocol high risk  - Ativan taper  - Continue thiamine  - Continue folic acid    # Hypokalemia  - replete    # Hypernatremia  - D5W for now  - Repeat BMP in am     # DVT prophylaxis  - Low risk

## 2024-11-04 NOTE — H&P ADULT - NSHPLABSRESULTS_GEN_ALL_CORE
FINDINGS:    LUNGS AND LARGE AIRWAYS: Patent central airways. Patchy   peribronchovascular airspace opacities predominantly in the right lower   lobe and right middle lobe and to a lesser degree in the left upper lobe   and left lower lobe with a few patchy peripheral airspace opacities in   the right upper lobe apex. Focal scarring in the right upper lobe with   bronchiectasis.  PLEURA: No pleural effusion.  VESSELS: Main pulmonary artery is not dilated. Thoracic aorta is normal   in caliber.  HEART: Heart size is normal. No pericardial effusion.  MEDIASTINUM AND ARIANA: No lymphadenopathy.  CHEST WALL AND LOWER NECK: Within normal limits.  VISUALIZED UPPER ABDOMEN: Tiny hiatus hernia.  BONES: Degenerative changes of the spine.    IMPRESSION:  Patchy peribronchovascular airspace opacities in both lungs with   predominance in the right lower lobe and right middle lobe compatible   with multifocal pneumonia.

## 2024-11-04 NOTE — PATIENT PROFILE ADULT - FALL HARM RISK - FACTORS
CIWA protocol initiation less than 96 hours/Impaired gait/IV and/or equipment tethered to patient/Weakness

## 2024-11-04 NOTE — DIETITIAN INITIAL EVALUATION ADULT - OTHER INFO
53 y/o male w asthma prior hx intubation, alcohol abuse, traumatic SAH 2019 came to ED c/o SOB x 3 days. Being admitted for community acquired PNA w acute asthma/COPD exacerbation and alcohol intoxication. Multifocal PNA RML + RLL. Admitting diagnosis: asthma with acute exacerbation; community acquired pna.     Pt unarousable at time of RD visit. RD obtained bed scale wt 103# on 11/4; no wt history to review. Pt appears thin and frail, NFPE revealed mild-mod muscle/fat wasting. Recommend to c/w current diet to optimize nutrient intake. Continue to provide 100 mg thiamine, 1 mg folic acid, MVI w/ minerals daily to ensure 100% RDA met 2/2 ETOH abuse and malnutrition. Monitor closely for refeeding syndrome - please obtain BMP, Mg, and Phos levels. Monitor and replete K, Phos, Mg prn if begins to downtrend.  Will add ensure plus high protein TID to optimize PO intake (provides 350 kcal, 20g protein/ shake). Please see additional recommendations below.  53 y/o male w asthma prior hx intubation, alcohol abuse, traumatic SAH 2019 came to ED c/o SOB x 3 days. Being admitted for community acquired PNA w acute asthma/COPD exacerbation and alcohol intoxication. Multifocal PNA RML + RLL. Admitting diagnosis: asthma with acute exacerbation; community acquired pna.     Pt unarousable at time of RD visit. RD obtained bed scale wt 103# on 11/4; no wt history to review. Pt appears thin and frail, NFPE revealed mild-mod muscle/fat wasting. Recommend to c/w current diet to optimize nutrient intake. Continue to provide 100 mg thiamine, 1 mg folic acid, MVI w/ minerals daily to ensure 100% RDA met 2/2 ETOH abuse and malnutrition. Monitor closely for refeeding syndrome - please obtain BMP, Mg, and Phos levels. Monitor and replete K, Phos, Mg prn if begins to downtrend.  Will add ensure plus high protein TID to optimize PO intake (provides 350 kcal, 20g protein/ shake). Consider FA[facility] consult 2/2 pt reportedly cannot afford meds per EMR. Please see additional recommendations below.

## 2024-11-04 NOTE — DIETITIAN INITIAL EVALUATION ADULT - ADD RECOMMEND
1. c/w current diet to optimize nutritional status  2. Add ensure plus high protein TID to optimize PO intake (provides 350 kcal, 20g protein/ shake)   3. Continue to provide 100 mg thiamine, 1 mg folic acid, MVI w/ minerals daily to ensure 100% RDA met 2/2 ETOH abuse and malnutrition   4. Monitor bowel movements, if no BM for >3 days, consider implementing standing bowel regimen.   5. Monitor daily lytes/min and replete prn; at risk for RFS  6. Encourage protein-rich foods, maximize food preferences  7. Obtain weekly wt to track/trend changes   8. Confirm goals of care regarding nutrition support   RD will continue to monitor PO intake, labs, hydration, and wt prn.

## 2024-11-05 LAB
ALBUMIN SERPL ELPH-MCNC: 3.2 G/DL — LOW (ref 3.3–5)
ALP SERPL-CCNC: 89 U/L — SIGNIFICANT CHANGE UP (ref 40–120)
ALT FLD-CCNC: 15 U/L — SIGNIFICANT CHANGE UP (ref 12–78)
ANION GAP SERPL CALC-SCNC: 7 MMOL/L — SIGNIFICANT CHANGE UP (ref 5–17)
AST SERPL-CCNC: 14 U/L — LOW (ref 15–37)
BILIRUB SERPL-MCNC: 0.3 MG/DL — SIGNIFICANT CHANGE UP (ref 0.2–1.2)
BUN SERPL-MCNC: 10 MG/DL — SIGNIFICANT CHANGE UP (ref 7–23)
CALCIUM SERPL-MCNC: 8.9 MG/DL — SIGNIFICANT CHANGE UP (ref 8.5–10.1)
CHLORIDE SERPL-SCNC: 107 MMOL/L — SIGNIFICANT CHANGE UP (ref 96–108)
CO2 SERPL-SCNC: 25 MMOL/L — SIGNIFICANT CHANGE UP (ref 22–31)
CREAT SERPL-MCNC: 0.65 MG/DL — SIGNIFICANT CHANGE UP (ref 0.5–1.3)
EGFR: 113 ML/MIN/1.73M2 — SIGNIFICANT CHANGE UP
GLUCOSE SERPL-MCNC: 144 MG/DL — HIGH (ref 70–99)
HCT VFR BLD CALC: 36.1 % — LOW (ref 39–50)
HGB BLD-MCNC: 12 G/DL — LOW (ref 13–17)
MAGNESIUM SERPL-MCNC: 2.3 MG/DL — SIGNIFICANT CHANGE UP (ref 1.6–2.6)
MCHC RBC-ENTMCNC: 30.5 PG — SIGNIFICANT CHANGE UP (ref 27–34)
MCHC RBC-ENTMCNC: 33.2 G/DL — SIGNIFICANT CHANGE UP (ref 32–36)
MCV RBC AUTO: 91.6 FL — SIGNIFICANT CHANGE UP (ref 80–100)
PHOSPHATE SERPL-MCNC: 2.9 MG/DL — SIGNIFICANT CHANGE UP (ref 2.5–4.5)
PLATELET # BLD AUTO: 331 K/UL — SIGNIFICANT CHANGE UP (ref 150–400)
POTASSIUM SERPL-MCNC: 3.7 MMOL/L — SIGNIFICANT CHANGE UP (ref 3.5–5.3)
POTASSIUM SERPL-SCNC: 3.7 MMOL/L — SIGNIFICANT CHANGE UP (ref 3.5–5.3)
PROT SERPL-MCNC: 7.5 GM/DL — SIGNIFICANT CHANGE UP (ref 6–8.3)
RBC # BLD: 3.94 M/UL — LOW (ref 4.2–5.8)
RBC # FLD: 16.1 % — HIGH (ref 10.3–14.5)
SODIUM SERPL-SCNC: 139 MMOL/L — SIGNIFICANT CHANGE UP (ref 135–145)
WBC # BLD: 10.42 K/UL — SIGNIFICANT CHANGE UP (ref 3.8–10.5)
WBC # FLD AUTO: 10.42 K/UL — SIGNIFICANT CHANGE UP (ref 3.8–10.5)

## 2024-11-05 PROCEDURE — 71045 X-RAY EXAM CHEST 1 VIEW: CPT | Mod: 26

## 2024-11-05 PROCEDURE — 99233 SBSQ HOSP IP/OBS HIGH 50: CPT

## 2024-11-05 RX ORDER — BUDESONIDE AND FORMOTEROL FUMARATE DIHYDRATE 80; 4.5 UG/1; UG/1
2 AEROSOL RESPIRATORY (INHALATION)
Refills: 0 | Status: DISCONTINUED | OUTPATIENT
Start: 2024-11-05 | End: 2024-11-07

## 2024-11-05 RX ORDER — POTASSIUM CHLORIDE 10 MEQ
40 TABLET, EXTENDED RELEASE ORAL ONCE
Refills: 0 | Status: COMPLETED | OUTPATIENT
Start: 2024-11-05 | End: 2024-11-05

## 2024-11-05 RX ORDER — BUDESONIDE AND FORMOTEROL FUMARATE DIHYDRATE 80; 4.5 UG/1; UG/1
1 AEROSOL RESPIRATORY (INHALATION)
Refills: 0 | Status: DISCONTINUED | OUTPATIENT
Start: 2024-11-05 | End: 2024-11-05

## 2024-11-05 RX ORDER — IPRATROPIUM BROMIDE AND ALBUTEROL SULFATE .5; 2.5 MG/3ML; MG/3ML
3 SOLUTION RESPIRATORY (INHALATION) EVERY 6 HOURS
Refills: 0 | Status: DISCONTINUED | OUTPATIENT
Start: 2024-11-05 | End: 2024-11-05

## 2024-11-05 RX ORDER — ALBUTEROL 90 MCG
2 AEROSOL (GRAM) INHALATION EVERY 12 HOURS
Refills: 0 | Status: DISCONTINUED | OUTPATIENT
Start: 2024-11-05 | End: 2024-11-07

## 2024-11-05 RX ORDER — ALBUTEROL 90 MCG
2.5 AEROSOL (GRAM) INHALATION EVERY 6 HOURS
Refills: 0 | Status: DISCONTINUED | OUTPATIENT
Start: 2024-11-05 | End: 2024-11-06

## 2024-11-05 RX ORDER — BUDESONIDE AND FORMOTEROL FUMARATE DIHYDRATE 80; 4.5 UG/1; UG/1
1 AEROSOL RESPIRATORY (INHALATION) ONCE
Refills: 0 | Status: COMPLETED | OUTPATIENT
Start: 2024-11-05 | End: 2024-11-05

## 2024-11-05 RX ADMIN — TIOTROPIUM BROMIDE INHALATION SPRAY 2 PUFF(S): 1.56 SPRAY, METERED RESPIRATORY (INHALATION) at 09:25

## 2024-11-05 RX ADMIN — BUDESONIDE AND FORMOTEROL FUMARATE DIHYDRATE 1 PUFF(S): 80; 4.5 AEROSOL RESPIRATORY (INHALATION) at 17:43

## 2024-11-05 RX ADMIN — Medication 1.5 MILLIGRAM(S): at 14:04

## 2024-11-05 RX ADMIN — Medication 100 MILLIGRAM(S): at 10:27

## 2024-11-05 RX ADMIN — Medication 1.5 MILLIGRAM(S): at 22:14

## 2024-11-05 RX ADMIN — Medication 75 MILLILITER(S): at 05:08

## 2024-11-05 RX ADMIN — FLUTICASONE PROPIONATE AND SALMETEROL XINAFOATE 1 DOSE(S): 230; 21 AEROSOL, METERED RESPIRATORY (INHALATION) at 09:25

## 2024-11-05 RX ADMIN — Medication 1.5 MILLIGRAM(S): at 17:31

## 2024-11-05 RX ADMIN — METHYLPREDNISOLONE ACETATE 40 MILLIGRAM(S): 80 INJECTION, SUSPENSION INTRALESIONAL; INTRAMUSCULAR; INTRASYNOVIAL; SOFT TISSUE at 10:27

## 2024-11-05 RX ADMIN — Medication 40 MILLIEQUIVALENT(S): at 14:04

## 2024-11-05 RX ADMIN — BUDESONIDE AND FORMOTEROL FUMARATE DIHYDRATE 2 PUFF(S): 80; 4.5 AEROSOL RESPIRATORY (INHALATION) at 20:38

## 2024-11-05 RX ADMIN — Medication 2.5 MILLIGRAM(S): at 20:38

## 2024-11-05 RX ADMIN — FOLIC ACID 1 MILLIGRAM(S): 1 TABLET ORAL at 10:27

## 2024-11-05 RX ADMIN — Medication 1.5 MILLIGRAM(S): at 05:02

## 2024-11-05 RX ADMIN — METHYLPREDNISOLONE ACETATE 40 MILLIGRAM(S): 80 INJECTION, SUSPENSION INTRALESIONAL; INTRAMUSCULAR; INTRASYNOVIAL; SOFT TISSUE at 22:14

## 2024-11-05 RX ADMIN — Medication 1.5 MILLIGRAM(S): at 10:27

## 2024-11-05 RX ADMIN — Medication 2.5 MILLIGRAM(S): at 17:43

## 2024-11-05 RX ADMIN — Medication 1 TABLET(S): at 10:27

## 2024-11-05 RX ADMIN — Medication 1000 MILLIGRAM(S): at 02:23

## 2024-11-05 RX ADMIN — Medication 2 MILLIGRAM(S): at 00:48

## 2024-11-05 RX ADMIN — AZITHROMYCIN DIHYDRATE 255 MILLIGRAM(S): 200 POWDER, FOR SUSPENSION ORAL at 02:26

## 2024-11-05 NOTE — DISCHARGE NOTE NURSING/CASE MANAGEMENT/SOCIAL WORK - NSDCFUADDAPPT_GEN_ALL_CORE_FT
Follow up at [facility]  with Dr. Vieira  on 12/3 2 pm apt.  [address] [place], [place]

## 2024-11-05 NOTE — DISCHARGE NOTE NURSING/CASE MANAGEMENT/SOCIAL WORK - FINANCIAL ASSISTANCE
[facility] provides services at a reduced cost to those who are determined to be eligible through [facility]’s financial assistance program. Information regarding [facility]’s financial assistance program can be found by going to [email or web address] or by calling [phone].

## 2024-11-05 NOTE — PROGRESS NOTE ADULT - ASSESSMENT
54 year old male w asthma prior hx intubation, alcohol abuse, traumatic SAH 2019 came to ED c/o SOB x 3 days.     # Multifocal pneumonia with bronchospasm  CXR notable for right lower lobe infiltrate. CT notable for patchy peribronchovascular airspace opacities in both lungs with predominance in the right lower lobe and middle lobe.   - Continue solumedrol 40 mg q12 hrs   - Albuterol nebs q6 hrs   - Continue advair and spiriva  - On rocephin and azithromycin  - FU cultures    # Etoh abuse  - Continue ciwa protocol high risk  - Ativan taper  - Continue thiamine, mvi, folic acid     # Hypokalemia  - replete    # Hypernatremia (resolved)  - s/p D5W fluids    # DVT prophylaxis  - Low risk

## 2024-11-05 NOTE — DISCHARGE NOTE NURSING/CASE MANAGEMENT/SOCIAL WORK - PATIENT PORTAL LINK FT
You can access the FollowMyHealth Patient Portal offered by [facility] by registering at the following website: [email or web address]. By joining [employer]’s FollowMyHealth portal, you will also be able to view your health information using other applications (apps) compatible with our system.

## 2024-11-06 LAB
ALBUMIN SERPL ELPH-MCNC: 3.1 G/DL — LOW (ref 3.3–5)
ALP SERPL-CCNC: 81 U/L — SIGNIFICANT CHANGE UP (ref 40–120)
ALT FLD-CCNC: 14 U/L — SIGNIFICANT CHANGE UP (ref 12–78)
ANION GAP SERPL CALC-SCNC: 6 MMOL/L — SIGNIFICANT CHANGE UP (ref 5–17)
AST SERPL-CCNC: 14 U/L — LOW (ref 15–37)
BILIRUB SERPL-MCNC: 0.4 MG/DL — SIGNIFICANT CHANGE UP (ref 0.2–1.2)
BUN SERPL-MCNC: 15 MG/DL — SIGNIFICANT CHANGE UP (ref 7–23)
CALCIUM SERPL-MCNC: 9.3 MG/DL — SIGNIFICANT CHANGE UP (ref 8.5–10.1)
CHLORIDE SERPL-SCNC: 107 MMOL/L — SIGNIFICANT CHANGE UP (ref 96–108)
CO2 SERPL-SCNC: 27 MMOL/L — SIGNIFICANT CHANGE UP (ref 22–31)
CREAT SERPL-MCNC: 0.77 MG/DL — SIGNIFICANT CHANGE UP (ref 0.5–1.3)
EGFR: 108 ML/MIN/1.73M2 — SIGNIFICANT CHANGE UP
GLUCOSE SERPL-MCNC: 131 MG/DL — HIGH (ref 70–99)
HCT VFR BLD CALC: 37.7 % — LOW (ref 39–50)
HGB BLD-MCNC: 12.2 G/DL — LOW (ref 13–17)
MAGNESIUM SERPL-MCNC: 2.3 MG/DL — SIGNIFICANT CHANGE UP (ref 1.6–2.6)
MCHC RBC-ENTMCNC: 29.8 PG — SIGNIFICANT CHANGE UP (ref 27–34)
MCHC RBC-ENTMCNC: 32.4 G/DL — SIGNIFICANT CHANGE UP (ref 32–36)
MCV RBC AUTO: 92.2 FL — SIGNIFICANT CHANGE UP (ref 80–100)
PHOSPHATE SERPL-MCNC: 3.8 MG/DL — SIGNIFICANT CHANGE UP (ref 2.5–4.5)
PLATELET # BLD AUTO: 346 K/UL — SIGNIFICANT CHANGE UP (ref 150–400)
POTASSIUM SERPL-MCNC: 4.1 MMOL/L — SIGNIFICANT CHANGE UP (ref 3.5–5.3)
POTASSIUM SERPL-SCNC: 4.1 MMOL/L — SIGNIFICANT CHANGE UP (ref 3.5–5.3)
PROT SERPL-MCNC: 7.4 GM/DL — SIGNIFICANT CHANGE UP (ref 6–8.3)
RBC # BLD: 4.09 M/UL — LOW (ref 4.2–5.8)
RBC # FLD: 16.3 % — HIGH (ref 10.3–14.5)
SODIUM SERPL-SCNC: 140 MMOL/L — SIGNIFICANT CHANGE UP (ref 135–145)
WBC # BLD: 8.16 K/UL — SIGNIFICANT CHANGE UP (ref 3.8–10.5)
WBC # FLD AUTO: 8.16 K/UL — SIGNIFICANT CHANGE UP (ref 3.8–10.5)

## 2024-11-06 PROCEDURE — 99233 SBSQ HOSP IP/OBS HIGH 50: CPT

## 2024-11-06 RX ORDER — LORAZEPAM 2 MG
0.5 TABLET ORAL EVERY 8 HOURS
Refills: 0 | Status: DISCONTINUED | OUTPATIENT
Start: 2024-11-06 | End: 2024-11-06

## 2024-11-06 RX ORDER — BUDESONIDE AND FORMOTEROL FUMARATE DIHYDRATE 80; 4.5 UG/1; UG/1
1 AEROSOL RESPIRATORY (INHALATION)
Refills: 0 | Status: DISCONTINUED | OUTPATIENT
Start: 2024-11-06 | End: 2024-11-07

## 2024-11-06 RX ADMIN — BUDESONIDE AND FORMOTEROL FUMARATE DIHYDRATE 2 PUFF(S): 80; 4.5 AEROSOL RESPIRATORY (INHALATION) at 19:36

## 2024-11-06 RX ADMIN — Medication 2.5 MILLIGRAM(S): at 08:33

## 2024-11-06 RX ADMIN — FOLIC ACID 1 MILLIGRAM(S): 1 TABLET ORAL at 10:19

## 2024-11-06 RX ADMIN — METHYLPREDNISOLONE ACETATE 40 MILLIGRAM(S): 80 INJECTION, SUSPENSION INTRALESIONAL; INTRAMUSCULAR; INTRASYNOVIAL; SOFT TISSUE at 10:19

## 2024-11-06 RX ADMIN — Medication 2.5 MILLIGRAM(S): at 13:04

## 2024-11-06 RX ADMIN — Medication 0.5 MILLIGRAM(S): at 14:13

## 2024-11-06 RX ADMIN — Medication 1000 MILLIGRAM(S): at 02:07

## 2024-11-06 RX ADMIN — Medication 1 MILLIGRAM(S): at 06:21

## 2024-11-06 RX ADMIN — BUDESONIDE AND FORMOTEROL FUMARATE DIHYDRATE 2 PUFF(S): 80; 4.5 AEROSOL RESPIRATORY (INHALATION) at 08:33

## 2024-11-06 RX ADMIN — Medication 1 MILLIGRAM(S): at 10:19

## 2024-11-06 RX ADMIN — Medication 0.5 MILLIGRAM(S): at 22:50

## 2024-11-06 RX ADMIN — Medication 1 MILLIGRAM(S): at 02:07

## 2024-11-06 RX ADMIN — Medication 1 TABLET(S): at 10:19

## 2024-11-06 RX ADMIN — Medication 100 MILLIGRAM(S): at 10:20

## 2024-11-06 RX ADMIN — AZITHROMYCIN DIHYDRATE 255 MILLIGRAM(S): 200 POWDER, FOR SUSPENSION ORAL at 02:07

## 2024-11-06 NOTE — PROGRESS NOTE ADULT - NUTRITIONAL ASSESSMENT
This patient has been assessed with a concern for Malnutrition and has been determined to have a diagnosis/diagnoses of Moderate protein-calorie malnutrition and Underweight (BMI < 19).    This patient is being managed with:   Diet Regular-  Entered: Nov 4 2024  1:51AM  
This patient has been assessed with a concern for Malnutrition and has been determined to have a diagnosis/diagnoses of Moderate protein-calorie malnutrition and Underweight (BMI < 19).    This patient is being managed with:   Diet Regular-  Entered: Nov 4 2024  1:51AM

## 2024-11-06 NOTE — PROGRESS NOTE ADULT - SUBJECTIVE AND OBJECTIVE BOX
HOSPITALIST ATTENDING PROGRESS NOTE    Chart and meds reviewed.  Patient seen and examined.    CC: Cough    Subjective: Still with wheezing, cough and pleuritic chest pain. No fever    All other systems reviewed and found to be negative with the exception of what has been described above.    MEDICATIONS  (STANDING):  albuterol    0.083%. 2.5 milliGRAM(s) Nebulizer once  azithromycin  IVPB 500 milliGRAM(s) IV Intermittent every 24 hours  cefTRIAXone Injectable. 1000 milliGRAM(s) IV Push every 24 hours  fluticasone propionate/ salmeterol 250-50 MICROgram(s) Diskus 1 Dose(s) Inhalation two times a day  folic acid 1 milliGRAM(s) Oral daily  influenza   Vaccine 0.5 milliLiter(s) IntraMuscular once  LORazepam   Injectable   IV Push   LORazepam   Injectable 2 milliGRAM(s) IV Push every 4 hours  methylPREDNISolone sodium succinate Injectable 40 milliGRAM(s) IV Push every 12 hours  multivitamin 1 Tablet(s) Oral daily  potassium chloride   Powder 40 milliEquivalent(s) Oral once  sodium chloride 0.9%. 1000 milliLiter(s) (125 mL/Hr) IV Continuous <Continuous>  thiamine 100 milliGRAM(s) Oral daily  tiotropium 2.5 MICROgram(s) Inhaler 2 Puff(s) Inhalation daily    MEDICATIONS  (PRN):  acetaminophen     Tablet .. 650 milliGRAM(s) Oral every 6 hours PRN Mild Pain (1 - 3)  albuterol    90 MICROgram(s) HFA Inhaler 4 Puff(s) Inhalation every 6 hours PRN Shortness of Breath and/or Wheezing  aluminum hydroxide/magnesium hydroxide/simethicone Suspension 30 milliLiter(s) Oral every 4 hours PRN Dyspepsia  guaifenesin/dextromethorphan Oral Liquid 10 milliLiter(s) Oral every 6 hours PRN Cough  LORazepam   Injectable 2 milliGRAM(s) IV Push every 1 hour PRN Symptom-triggered: each CIWA -Ar score 8 or GREATER  ondansetron Injectable 4 milliGRAM(s) IV Push every 6 hours PRN Nausea and/or Vomiting  senna 2 Tablet(s) Oral at bedtime PRN Constipation    Vital Signs Last 24 Hrs  T(C): 36.7 (04 Nov 2024 08:08), Max: 36.8 (03 Nov 2024 23:20)  T(F): 98 (04 Nov 2024 08:08), Max: 98.3 (03 Nov 2024 23:20)  HR: 96 (04 Nov 2024 08:08) (88 - 109)  BP: 114/82 (04 Nov 2024 08:08) (98/64 - 124/76)  BP(mean): 89 (04 Nov 2024 03:25) (75 - 97)  RR: 18 (04 Nov 2024 08:08) (16 - 19)  SpO2: 97% (04 Nov 2024 08:08) (93% - 100%)    Parameters below as of 04 Nov 2024 08:08  Patient On (Oxygen Delivery Method): room air    GEN: NAD  HEENT:  pupils equal and reactive, EOMI, no oropharyngeal lesions, erythema, exudates, oral thrush  NECK:   supple, no carotid bruits  CV:  +S1, +S2, regular, no murmurs  RESP:  bilateral wheeze and ronchi   GI:  abdomen soft, non-tender, non-distended, normal BS  EXT:  no clubbing, no cyanosis, no edema, no calf pain, swelling or erythema  NEURO:  AAOX3, no focal neurological deficits, follows all commands, able to move extremities spontaneously  SKIN:  no ulcers, lesions or rashes    LABS:                          12.3   14.62 )-----------( 340      ( 03 Nov 2024 23:47 )             35.9     11-04    146[H]  |  119[H]  |  4[L]  ----------------------------<  134[H]  3.4[L]   |  21[L]  |  0.56    Ca    7.8[L]      04 Nov 2024 05:03  Phos  1.7     11-04  Mg     1.9     11-04    TPro  6.8  /  Alb  2.9[L]  /  TBili  0.2  /  DBili  0.1  /  AST  13[L]  /  ALT  12  /  AlkPhos  65  11-04    LIVER FUNCTIONS - ( 04 Nov 2024 05:03 )  Alb: 2.9 g/dL / Pro: 6.8 gm/dL / ALK PHOS: 65 U/L / ALT: 12 U/L / AST: 13 U/L / GGT: x           Urinalysis Basic - ( 04 Nov 2024 05:03 )  Color: x / Appearance: x / SG: x / pH: x  Gluc: 134 mg/dL / Ketone: x  / Bili: x / Urobili: x   Blood: x / Protein: x / Nitrite: x   Leuk Esterase: x / RBC: x / WBC x   Sq Epi: x / Non Sq Epi: x / Bacteria: x       CT Chest No Cont (11.04.24 @ 01:09) >  IMPRESSION:  Patchy peribronchovascular airspace opacities in both lungs with   predominance in the right lower lobe and right middle lobe compatible   with multifocal pneumonia.        
HOSPITALIST ATTENDING PROGRESS NOTE    Chart and meds reviewed.  Patient seen and examined.    CC: sob    Subjective: NAEO pt reporting some nausea otherwise scoring only 1-2 on ciwa    All other systems reviewed and found to be negative with the exception of what has been described above.    MEDICATIONS  (STANDING):  albuterol    90 MICROgram(s) HFA Inhaler 2 Puff(s) Inhalation every 12 hours  azithromycin  IVPB 500 milliGRAM(s) IV Intermittent every 24 hours  budesonide 160 MICROgram(s)/formoterol 4.5 MICROgram(s) Inhaler 2 Puff(s) Inhalation two times a day  cefTRIAXone Injectable. 1000 milliGRAM(s) IV Push every 24 hours  dextrose 5%. 1000 milliLiter(s) (75 mL/Hr) IV Continuous <Continuous>  folic acid 1 milliGRAM(s) Oral daily  influenza   Vaccine 0.5 milliLiter(s) IntraMuscular once  LORazepam   Injectable   IV Push   LORazepam   Injectable 1.5 milliGRAM(s) IV Push every 4 hours  methylPREDNISolone sodium succinate Injectable 40 milliGRAM(s) IV Push every 12 hours  multivitamin 1 Tablet(s) Oral daily  thiamine 100 milliGRAM(s) Oral daily    MEDICATIONS  (PRN):  acetaminophen     Tablet .. 650 milliGRAM(s) Oral every 6 hours PRN Mild Pain (1 - 3)  aluminum hydroxide/magnesium hydroxide/simethicone Suspension 30 milliLiter(s) Oral every 4 hours PRN Dyspepsia  budesonide 160 MICROgram(s)/formoterol 4.5 MICROgram(s) Inhaler 1 Puff(s) Inhalation <User Schedule> PRN ASTHMA related shortness of breath or wheezing  guaifenesin/dextromethorphan Oral Liquid 10 milliLiter(s) Oral every 6 hours PRN Cough  LORazepam   Injectable 2 milliGRAM(s) IV Push every 1 hour PRN Symptom-triggered: each CIWA -Ar score 8 or GREATER  ondansetron Injectable 4 milliGRAM(s) IV Push every 6 hours PRN Nausea and/or Vomiting  senna 2 Tablet(s) Oral at bedtime PRN Constipation      VITALS:  T(F): 98.2 (11-05-24 @ 13:59), Max: 98.5 (11-04-24 @ 16:21)  HR: 95 (11-05-24 @ 13:59) (86 - 105)  BP: 115/77 (11-05-24 @ 13:59) (108/79 - 141/87)  RR: 18 (11-05-24 @ 13:59) (18 - 18)  SpO2: 96% (11-05-24 @ 13:59) (95% - 97%)  Wt(kg): --    I&O's Summary    04 Nov 2024 07:01  -  05 Nov 2024 07:00  --------------------------------------------------------  IN: 0 mL / OUT: 1000 mL / NET: -1000 mL        CAPILLARY BLOOD GLUCOSE          PHYSICAL EXAM:  Gen: NAD  HEENT:  pupils equal and reactive, EOMI, no oropharyngeal lesions, erythema, exudates, oral thrush  NECK:   supple, no carotid bruits, no palpable lymph nodes, no thyromegaly  CV:  +S1, +S2, regular, no murmurs or rubs  RESP:   lungs clear to auscultation bilaterally, no wheezing, rales, rhonchi, good air entry bilaterally  BREAST:  not examined  GI:  abdomen soft, non-tender, non-distended, normal BS, no bruits, no abdominal masses, no palpable masses  RECTAL:  not examined  :  not examined  MSK:   normal muscle tone, no atrophy, no rigidity, no contractions  EXT:  no clubbing, no cyanosis, no edema, no calf pain, swelling or erythema  VASCULAR:  pulses equal and symmetric in the upper and lower extremities  NEURO:  AAOX3, no focal neurological deficits, follows all commands, able to move extremities spontaneously  SKIN:  no ulcers, lesions or rashes    LABS:                            12.0   10.42 )-----------( 331      ( 05 Nov 2024 08:23 )             36.1     11-05    139  |  107  |  10  ----------------------------<  144[H]  3.7   |  25  |  0.65    Ca    8.9      05 Nov 2024 08:23  Phos  2.9     11-05  Mg     2.3     11-05    TPro  7.5  /  Alb  3.2[L]  /  TBili  0.3  /  DBili  x   /  AST  14[L]  /  ALT  15  /  AlkPhos  89  11-05        LIVER FUNCTIONS - ( 05 Nov 2024 08:23 )  Alb: 3.2 g/dL / Pro: 7.5 gm/dL / ALK PHOS: 89 U/L / ALT: 15 U/L / AST: 14 U/L / GGT: x             Urinalysis Basic - ( 05 Nov 2024 08:23 )    Color: x / Appearance: x / SG: x / pH: x  Gluc: 144 mg/dL / Ketone: x  / Bili: x / Urobili: x   Blood: x / Protein: x / Nitrite: x   Leuk Esterase: x / RBC: x / WBC x   Sq Epi: x / Non Sq Epi: x / Bacteria: x              CULTURES:      Additional results/Imaging, I have personally reviewed:    Telemetry, personally reviewed:
HOSPITALIST ATTENDING PROGRESS NOTE    Chart and meds reviewed.  Patient seen and examined.    CC: multifocal PNA    Subjective: Diffuse wheezing but reports breathing feels a bit better.     All other systems reviewed and found to be negative with the exception of what has been described above.    MEDICATIONS  (STANDING):  albuterol    90 MICROgram(s) HFA Inhaler 2 Puff(s) Inhalation every 12 hours  budesonide 160 MICROgram(s)/formoterol 4.5 MICROgram(s) Inhaler 2 Puff(s) Inhalation two times a day  cefTRIAXone Injectable. 1000 milliGRAM(s) IV Push every 24 hours  folic acid 1 milliGRAM(s) Oral daily  influenza   Vaccine 0.5 milliLiter(s) IntraMuscular once  LORazepam   Injectable 0.5 milliGRAM(s) IV Push every 8 hours  multivitamin 1 Tablet(s) Oral daily    MEDICATIONS  (PRN):  acetaminophen     Tablet .. 650 milliGRAM(s) Oral every 6 hours PRN Mild Pain (1 - 3)  aluminum hydroxide/magnesium hydroxide/simethicone Suspension 30 milliLiter(s) Oral every 4 hours PRN Dyspepsia  budesonide 160 MICROgram(s)/formoterol 4.5 MICROgram(s) Inhaler 1 Puff(s) Inhalation <User Schedule> PRN ASTHMA related shortness of breath or wheezing  guaifenesin/dextromethorphan Oral Liquid 10 milliLiter(s) Oral every 6 hours PRN Cough  LORazepam   Injectable 2 milliGRAM(s) IV Push every 1 hour PRN Symptom-triggered: each CIWA -Ar score 8 or GREATER  ondansetron Injectable 4 milliGRAM(s) IV Push every 6 hours PRN Nausea and/or Vomiting  senna 2 Tablet(s) Oral at bedtime PRN Constipation      VITALS:  T(F): 98.2 (11-06-24 @ 14:05), Max: 98.6 (11-05-24 @ 17:25)  HR: 97 (11-06-24 @ 14:05) (70 - 100)  BP: 113/78 (11-06-24 @ 14:05) (107/80 - 130/89)  RR: 18 (11-06-24 @ 14:05) (17 - 18)  SpO2: 97% (11-06-24 @ 14:05) (94% - 97%)  Wt(kg): --    I&O's Summary    05 Nov 2024 07:01  -  06 Nov 2024 07:00  --------------------------------------------------------  IN: 0 mL / OUT: 950 mL / NET: -950 mL        CAPILLARY BLOOD GLUCOSE          PHYSICAL EXAM:  Gen: NAD  HEENT:  pupils equal and reactive, EOMI, no oropharyngeal lesions, erythema, exudates, oral thrush  NECK:   supple, no carotid bruits, no palpable lymph nodes, no thyromegaly  CV:  +S1, +S2, regular, no murmurs or rubs  RESP:   diffuse exp wheeze  BREAST:  not examined  GI:  abdomen soft, non-tender, non-distended, normal BS, no bruits, no abdominal masses, no palpable masses  RECTAL:  not examined  :  not examined  MSK:   normal muscle tone, no atrophy, no rigidity, no contractions  EXT:  no clubbing, no cyanosis, no edema, no calf pain, swelling or erythema  VASCULAR:  pulses equal and symmetric in the upper and lower extremities  NEURO:  AAOX3, no focal neurological deficits, follows all commands, able to move extremities spontaneously  SKIN:  no ulcers, lesions or rashes    LABS:                            12.2   8.16  )-----------( 346      ( 06 Nov 2024 07:39 )             37.7     11-06    140  |  107  |  15  ----------------------------<  131[H]  4.1   |  27  |  0.77    Ca    9.3      06 Nov 2024 07:39  Phos  3.8     11-06  Mg     2.3     11-06    TPro  7.4  /  Alb  3.1[L]  /  TBili  0.4  /  DBili  x   /  AST  14[L]  /  ALT  14  /  AlkPhos  81  11-06        LIVER FUNCTIONS - ( 06 Nov 2024 07:39 )  Alb: 3.1 g/dL / Pro: 7.4 gm/dL / ALK PHOS: 81 U/L / ALT: 14 U/L / AST: 14 U/L / GGT: x             Urinalysis Basic - ( 06 Nov 2024 07:39 )    Color: x / Appearance: x / SG: x / pH: x  Gluc: 131 mg/dL / Ketone: x  / Bili: x / Urobili: x   Blood: x / Protein: x / Nitrite: x   Leuk Esterase: x / RBC: x / WBC x   Sq Epi: x / Non Sq Epi: x / Bacteria: x    CULTURES:  BCx NG    Additional results/Imaging, I have personally reviewed:  CT chest noncon 11/4/24: Patchy peribronchovascular airspace opacities in both lungs with predominance in the right lower lobe and right middle lobe compatible with multifocal pneumonia.    CXR 11/5/24: Right lung clearing.

## 2024-11-06 NOTE — PROGRESS NOTE ADULT - ASSESSMENT
54 year old male w asthma prior hx intubation, alcohol abuse, traumatic SAH 2019 came to ED c/o SOB x 3 days.     # Multifocal pneumonia with bronchospasm  CXR notable for right lower lobe infiltrate. CT notable for patchy peribronchovascular airspace opacities in both lungs with predominance in the right lower lobe and middle lobe.   -Azithro completed x 3d  -CTX x 5d  -symbicort, appreciate pharmacy assistance (d/w pharmacy when ready for d/c re: rx plan for d/c as pt uninsured)  -change steroids to oral prednisone    #EtOH abuse  -CIWA low this am  -abbreviate taper, ativan 0.5 q8 x 2 more doses and then stop    # Hypokalemia  - replete    # Hypernatremia (resolved)  - s/p D5W fluids    # DVT prophylaxis- SCDs    Possible d/c 11/7 vs 11/8 if respiratory status improved, remains w/o EtOH w/d

## 2024-11-06 NOTE — PROGRESS NOTE ADULT - REASON FOR ADMISSION
multifocal PNA  asthma/COPD exacerbation  alcohol intoxication

## 2024-11-07 VITALS
RESPIRATION RATE: 17 BRPM | TEMPERATURE: 99 F | OXYGEN SATURATION: 94 % | DIASTOLIC BLOOD PRESSURE: 75 MMHG | SYSTOLIC BLOOD PRESSURE: 114 MMHG | HEART RATE: 80 BPM

## 2024-11-07 PROCEDURE — 99239 HOSP IP/OBS DSCHRG MGMT >30: CPT

## 2024-11-07 RX ORDER — SENNA 187 MG
2 TABLET ORAL
Qty: 20 | Refills: 0
Start: 2024-11-07 | End: 2024-11-16

## 2024-11-07 RX ORDER — FOLIC ACID 1 MG/1
1 TABLET ORAL
Qty: 15 | Refills: 0
Start: 2024-11-07 | End: 2024-11-21

## 2024-11-07 RX ORDER — B-COMPLEX WITH VITAMIN C
1 VIAL (ML) INJECTION
Qty: 15 | Refills: 0
Start: 2024-11-07 | End: 2024-11-21

## 2024-11-07 RX ORDER — CEFUROXIME AXETIL 250 MG
1 TABLET ORAL
Qty: 8 | Refills: 0
Start: 2024-11-07 | End: 2024-11-10

## 2024-11-07 RX ORDER — BUDESONIDE AND FORMOTEROL FUMARATE DIHYDRATE 80; 4.5 UG/1; UG/1
2 AEROSOL RESPIRATORY (INHALATION)
Qty: 1 | Refills: 0
Start: 2024-11-07

## 2024-11-07 RX ORDER — ALBUTEROL 90 MCG
2 AEROSOL (GRAM) INHALATION
Qty: 2 | Refills: 0
Start: 2024-11-07 | End: 2024-11-20

## 2024-11-07 RX ADMIN — Medication 1000 MILLIGRAM(S): at 01:30

## 2024-11-07 RX ADMIN — Medication 40 MILLIGRAM(S): at 09:59

## 2024-11-07 RX ADMIN — Medication 1 TABLET(S): at 09:59

## 2024-11-07 RX ADMIN — FOLIC ACID 1 MILLIGRAM(S): 1 TABLET ORAL at 09:59

## 2024-11-07 RX ADMIN — BUDESONIDE AND FORMOTEROL FUMARATE DIHYDRATE 2 PUFF(S): 80; 4.5 AEROSOL RESPIRATORY (INHALATION) at 08:38

## 2024-11-07 NOTE — DISCHARGE NOTE PROVIDER - CARE PROVIDER_API CALL
Isela Levy.  Family Medicine  [address] [place] [id number]  Phone: [phone]  Fax: [phone]  Established Patient  Follow Up Time: 1 week

## 2024-11-07 NOTE — DISCHARGE NOTE PROVIDER - ATTENDING DISCHARGE PHYSICAL EXAMINATION:
T(C): 37 (11-07-24 @ 15:23), Max: 37.1 (11-06-24 @ 17:02)  HR: 80 (11-07-24 @ 15:23) (77 - 98)  BP: 114/75 (11-07-24 @ 15:23) (107/75 - 115/83)  RR: 17 (11-07-24 @ 15:23) (16 - 18)  SpO2: 94% (11-07-24 @ 15:23) (94% - 97%)    CONSTITUTIONAL: Well groomed, no apparent distress  EYES: EOMI, No conjunctival or scleral injection, non-icteric  ENMT: Oral mucosa with moist membranes. Normal dentition; no pharyngeal injection or exudates             NECK: Supple, symmetric and without tracheal deviation   RESP: No respiratory distress, + expiratory wheezes  CV: RRR, +S1S2, no MRG; no JVD; no peripheral edema  GI: Soft, NT, ND, no rebound, no guarding; no palpable masses; no hepatosplenomegaly; no hernia palpated  LYMPH: No cervical LAD or tenderness; no axillary LAD or tenderness; no inguinal LAD or tenderness  SKIN: No rashes or ulcers noted; no subcutaneous nodules or induration palpable  NEURO: no focal deficits  PSYCH: Appropriate insight/judgment; A+O x 3, mood and affect appropriate, recent/remote memory intact

## 2024-11-07 NOTE — DISCHARGE NOTE PROVIDER - NSDCFUADDAPPT_GEN_ALL_CORE_FT
Follow up at [facility]  with Dr. Vieira  on 12/3 2 pm apt.  [address], [place]

## 2024-11-07 NOTE — DISCHARGE NOTE PROVIDER - NSDCCPCAREPLAN_GEN_ALL_CORE_FT
PRINCIPAL DISCHARGE DIAGNOSIS  Diagnosis: Community acquired pneumonia  Assessment and Plan of Treatment: Complete 4 days or oral ceftin as outpatient  - Repeat cxr in 4-6 weeks to ensure resolution      SECONDARY DISCHARGE DIAGNOSES  Diagnosis: Acute asthma exacerbation  Assessment and Plan of Treatment: Symbicort twice daily    Diagnosis: Community acquired pneumonia  Assessment and Plan of Treatment:

## 2024-11-07 NOTE — DISCHARGE NOTE PROVIDER - HOSPITAL COURSE
Patient is a 54 y.o. male who presented for shortness of breath with PMH asthma prior hx intubation, alcohol abuse, traumatic SAH 2019 admitted for Multifocal PNA, Acute asthma/COPD exacerbation exacerbation who had failed outpatient steroids and antibiotics. Treated with IV antibiotics and IV Steroids.  Shortness of breath improved over the hospital course and discharged in stable condition and discharged on oral ceftin for an additional 4 days and oral steroid taper.     < from: Xray Chest 1 View- PORTABLE-Urgent (Xray Chest 1 View- PORTABLE-Urgent .) (11.05.24 @ 10:18) >    IMPRESSION:  Right lung clearing.    < end of copied text >    < from: CT Chest No Cont (11.04.24 @ 01:09) >    IMPRESSION:  Patchy peribronchovascular airspace opacities in both lungs with   predominance in the right lower lobe and right middle lobe compatible   with multifocal pneumonia.    < end of copied text >    < from: Xray Chest 1 View- PORTABLE-Urgent (11.03.24 @ 23:32) >    IMPRESSION: Right lower lobe infiltrate compatible with pneumonia    --- End of Report ---    < end of copied text >    Culture - Blood (11.04.24 @ 01:59)   Specimen Source: .Blood BLOOD  Culture Results:   No growth at 72 Hours

## 2024-11-07 NOTE — DISCHARGE NOTE PROVIDER - NSDCMRMEDTOKEN_GEN_ALL_CORE_FT
Albuterol (Eqv-Ventolin HFA) 90 mcg/inh inhalation aerosol: 2 inhaled every 6 hours  budesonide-formoterol 160 mcg-4.5 mcg/inh inhalation aerosol: 2 inhaler(s) inhaled 2 times a day  cefuroxime 500 mg oral tablet: 1 tab(s) orally 2 times a day  folic acid 1 mg oral tablet: 1 tab(s) orally once a day  Multiple Vitamins oral tablet: 1 tab(s) orally once a day  predniSONE 10 mg oral tablet: 1 tab(s) orally once a day 4 tabs po daily x 3 days, then 3 tabs po daily x 3 days, then 2 tabs po daily x 3 days, then 1 tab po daily x 3 days  senna leaf extract oral tablet: 2 tab(s) orally once a day (at bedtime) as needed for Constipation   Albuterol (Eqv-Ventolin HFA) 90 mcg/inh inhalation aerosol: 2 inhaler(s) inhaled every 6 hours  budesonide-formoterol 160 mcg-4.5 mcg/inh inhalation aerosol: 2 inhaler(s) inhaled 2 times a day  cefuroxime 500 mg oral tablet: 1 tab(s) orally 2 times a day  folic acid 1 mg oral tablet: 1 tab(s) orally once a day  Multiple Vitamins oral tablet: 1 tab(s) orally once a day  predniSONE 10 mg oral tablet: 1 tab(s) orally once a day 4 tabs po daily x 3 days, then 3 tabs po daily x 3 days, then 2 tabs po daily x 3 days, then 1 tab po daily x 3 days  senna leaf extract oral tablet: 2 tab(s) orally once a day (at bedtime) as needed for Constipation

## 2024-11-07 NOTE — DISCHARGE NOTE PROVIDER - DETAILS OF MALNUTRITION DIAGNOSIS/DIAGNOSES
This patient has been assessed with a concern for Malnutrition and was treated during this hospitalization for the following Nutrition diagnosis/diagnoses:     -  11/04/2024: Moderate protein-calorie malnutrition   -  11/04/2024: Underweight (BMI < 19)

## 2024-11-09 LAB
CULTURE RESULTS: SIGNIFICANT CHANGE UP
CULTURE RESULTS: SIGNIFICANT CHANGE UP
SPECIMEN SOURCE: SIGNIFICANT CHANGE UP
SPECIMEN SOURCE: SIGNIFICANT CHANGE UP

## 2024-11-20 DIAGNOSIS — Z79.52 LONG TERM (CURRENT) USE OF SYSTEMIC STEROIDS: ICD-10-CM

## 2024-11-20 DIAGNOSIS — Z79.899 OTHER LONG TERM (CURRENT) DRUG THERAPY: ICD-10-CM

## 2024-11-20 DIAGNOSIS — J44.1 CHRONIC OBSTRUCTIVE PULMONARY DISEASE WITH (ACUTE) EXACERBATION: ICD-10-CM

## 2024-11-20 DIAGNOSIS — J45.901 UNSPECIFIED ASTHMA WITH (ACUTE) EXACERBATION: ICD-10-CM

## 2024-11-20 DIAGNOSIS — E87.0 HYPEROSMOLALITY AND HYPERNATREMIA: ICD-10-CM

## 2024-11-20 DIAGNOSIS — E44.0 MODERATE PROTEIN-CALORIE MALNUTRITION: ICD-10-CM

## 2024-11-20 DIAGNOSIS — E87.6 HYPOKALEMIA: ICD-10-CM

## 2024-11-20 DIAGNOSIS — J44.0 CHRONIC OBSTRUCTIVE PULMONARY DISEASE WITH (ACUTE) LOWER RESPIRATORY INFECTION: ICD-10-CM

## 2024-11-20 DIAGNOSIS — F10.120 ALCOHOL ABUSE WITH INTOXICATION, UNCOMPLICATED: ICD-10-CM

## 2024-11-20 DIAGNOSIS — J18.9 PNEUMONIA, UNSPECIFIED ORGANISM: ICD-10-CM

## 2024-12-23 ENCOUNTER — EMERGENCY (EMERGENCY)
Facility: HOSPITAL | Age: 54
LOS: 0 days | Discharge: HOME | End: 2024-12-23
Attending: STUDENT IN AN ORGANIZED HEALTH CARE EDUCATION/TRAINING PROGRAM
Payer: MEDICAID

## 2024-12-23 VITALS
SYSTOLIC BLOOD PRESSURE: 128 MMHG | TEMPERATURE: 98 F | RESPIRATION RATE: 18 BRPM | DIASTOLIC BLOOD PRESSURE: 94 MMHG | HEART RATE: 102 BPM | OXYGEN SATURATION: 100 %

## 2024-12-23 VITALS — HEIGHT: 64 IN

## 2024-12-23 DIAGNOSIS — R06.2 WHEEZING: ICD-10-CM

## 2024-12-23 DIAGNOSIS — R06.02 SHORTNESS OF BREATH: ICD-10-CM

## 2024-12-23 DIAGNOSIS — J45.901 UNSPECIFIED ASTHMA WITH (ACUTE) EXACERBATION: ICD-10-CM

## 2024-12-23 LAB
ALBUMIN SERPL ELPH-MCNC: 3.6 G/DL — SIGNIFICANT CHANGE UP (ref 3.3–5)
ALP SERPL-CCNC: 90 U/L — SIGNIFICANT CHANGE UP (ref 40–120)
ALT FLD-CCNC: 20 U/L — SIGNIFICANT CHANGE UP (ref 12–78)
ANION GAP SERPL CALC-SCNC: 7 MMOL/L — SIGNIFICANT CHANGE UP (ref 5–17)
AST SERPL-CCNC: 33 U/L — SIGNIFICANT CHANGE UP (ref 15–37)
BASOPHILS # BLD AUTO: 0 K/UL — SIGNIFICANT CHANGE UP (ref 0–0.2)
BASOPHILS NFR BLD AUTO: 0 % — SIGNIFICANT CHANGE UP (ref 0–2)
BILIRUB SERPL-MCNC: 0.4 MG/DL — SIGNIFICANT CHANGE UP (ref 0.2–1.2)
BUN SERPL-MCNC: 6 MG/DL — LOW (ref 7–23)
CALCIUM SERPL-MCNC: 8.4 MG/DL — LOW (ref 8.5–10.1)
CHLORIDE SERPL-SCNC: 101 MMOL/L — SIGNIFICANT CHANGE UP (ref 96–108)
CO2 SERPL-SCNC: 26 MMOL/L — SIGNIFICANT CHANGE UP (ref 22–31)
CREAT SERPL-MCNC: 0.63 MG/DL — SIGNIFICANT CHANGE UP (ref 0.5–1.3)
EGFR: 114 ML/MIN/1.73M2 — SIGNIFICANT CHANGE UP
EOSINOPHIL # BLD AUTO: 2.35 K/UL — HIGH (ref 0–0.5)
EOSINOPHIL NFR BLD AUTO: 32 % — HIGH (ref 0–6)
FLUAV AG NPH QL: SIGNIFICANT CHANGE UP
FLUBV AG NPH QL: SIGNIFICANT CHANGE UP
GLUCOSE SERPL-MCNC: 105 MG/DL — HIGH (ref 70–99)
HCT VFR BLD CALC: 39 % — SIGNIFICANT CHANGE UP (ref 39–50)
HGB BLD-MCNC: 12.7 G/DL — LOW (ref 13–17)
LYMPHOCYTES # BLD AUTO: 2.57 K/UL — SIGNIFICANT CHANGE UP (ref 1–3.3)
LYMPHOCYTES # BLD AUTO: 35 % — SIGNIFICANT CHANGE UP (ref 13–44)
MANUAL SMEAR VERIFICATION: SIGNIFICANT CHANGE UP
MCHC RBC-ENTMCNC: 28.5 PG — SIGNIFICANT CHANGE UP (ref 27–34)
MCHC RBC-ENTMCNC: 32.6 G/DL — SIGNIFICANT CHANGE UP (ref 32–36)
MCV RBC AUTO: 87.4 FL — SIGNIFICANT CHANGE UP (ref 80–100)
MONOCYTES # BLD AUTO: 0.44 K/UL — SIGNIFICANT CHANGE UP (ref 0–0.9)
MONOCYTES NFR BLD AUTO: 6 % — SIGNIFICANT CHANGE UP (ref 2–14)
NEUTROPHILS # BLD AUTO: 1.32 K/UL — LOW (ref 1.8–7.4)
NEUTROPHILS NFR BLD AUTO: 18 % — LOW (ref 43–77)
NRBC # BLD: 0 /100 WBCS — SIGNIFICANT CHANGE UP (ref 0–0)
NRBC # BLD: SIGNIFICANT CHANGE UP /100 WBCS (ref 0–0)
NT-PROBNP SERPL-SCNC: 22 PG/ML — SIGNIFICANT CHANGE UP (ref 0–125)
PLAT MORPH BLD: NORMAL — SIGNIFICANT CHANGE UP
PLATELET # BLD AUTO: 228 K/UL — SIGNIFICANT CHANGE UP (ref 150–400)
POTASSIUM SERPL-MCNC: 3.1 MMOL/L — LOW (ref 3.5–5.3)
POTASSIUM SERPL-SCNC: 3.1 MMOL/L — LOW (ref 3.5–5.3)
PROT SERPL-MCNC: 7.3 GM/DL — SIGNIFICANT CHANGE UP (ref 6–8.3)
RBC # BLD: 4.46 M/UL — SIGNIFICANT CHANGE UP (ref 4.2–5.8)
RBC # FLD: 15.7 % — HIGH (ref 10.3–14.5)
RBC BLD AUTO: NORMAL — SIGNIFICANT CHANGE UP
RSV RNA NPH QL NAA+NON-PROBE: SIGNIFICANT CHANGE UP
SARS-COV-2 RNA SPEC QL NAA+PROBE: SIGNIFICANT CHANGE UP
SODIUM SERPL-SCNC: 134 MMOL/L — LOW (ref 135–145)
TROPONIN I, HIGH SENSITIVITY RESULT: 3.69 NG/L — SIGNIFICANT CHANGE UP
VARIANT LYMPHS # BLD: 9 % — HIGH (ref 0–6)
WBC # BLD: 7.35 K/UL — SIGNIFICANT CHANGE UP (ref 3.8–10.5)
WBC # FLD AUTO: 7.35 K/UL — SIGNIFICANT CHANGE UP (ref 3.8–10.5)

## 2024-12-23 PROCEDURE — 94640 AIRWAY INHALATION TREATMENT: CPT

## 2024-12-23 PROCEDURE — 82962 GLUCOSE BLOOD TEST: CPT

## 2024-12-23 PROCEDURE — 85025 COMPLETE CBC W/AUTO DIFF WBC: CPT

## 2024-12-23 PROCEDURE — 83880 ASSAY OF NATRIURETIC PEPTIDE: CPT

## 2024-12-23 PROCEDURE — 36415 COLL VENOUS BLD VENIPUNCTURE: CPT

## 2024-12-23 PROCEDURE — 96374 THER/PROPH/DIAG INJ IV PUSH: CPT

## 2024-12-23 PROCEDURE — 99285 EMERGENCY DEPT VISIT HI MDM: CPT

## 2024-12-23 PROCEDURE — 0241U: CPT

## 2024-12-23 PROCEDURE — 71045 X-RAY EXAM CHEST 1 VIEW: CPT

## 2024-12-23 PROCEDURE — 80053 COMPREHEN METABOLIC PANEL: CPT

## 2024-12-23 PROCEDURE — 84484 ASSAY OF TROPONIN QUANT: CPT

## 2024-12-23 PROCEDURE — 99285 EMERGENCY DEPT VISIT HI MDM: CPT | Mod: 25

## 2024-12-23 PROCEDURE — 93010 ELECTROCARDIOGRAM REPORT: CPT

## 2024-12-23 PROCEDURE — 71045 X-RAY EXAM CHEST 1 VIEW: CPT | Mod: 26

## 2024-12-23 PROCEDURE — 93005 ELECTROCARDIOGRAM TRACING: CPT

## 2024-12-23 RX ORDER — PREDNISONE 20 MG/1
2 TABLET ORAL
Qty: 8 | Refills: 0
Start: 2024-12-23 | End: 2024-12-26

## 2024-12-23 RX ORDER — IPRATROPIUM BROMIDE AND ALBUTEROL SULFATE 2.5; .5 MG/3ML; MG/3ML
3 SOLUTION RESPIRATORY (INHALATION)
Refills: 0 | Status: COMPLETED | OUTPATIENT
Start: 2024-12-23 | End: 2024-12-23

## 2024-12-23 RX ORDER — POTASSIUM CHLORIDE 600 MG/1
40 TABLET, EXTENDED RELEASE ORAL ONCE
Refills: 0 | Status: COMPLETED | OUTPATIENT
Start: 2024-12-23 | End: 2024-12-23

## 2024-12-23 RX ORDER — METHYLPREDNISOLONE SOD SUCC 125 MG
125 VIAL (EA) INJECTION ONCE
Refills: 0 | Status: COMPLETED | OUTPATIENT
Start: 2024-12-23 | End: 2024-12-23

## 2024-12-23 RX ORDER — ALBUTEROL 90 MCG
2 AEROSOL (GRAM) INHALATION EVERY 6 HOURS
Refills: 0 | Status: DISCONTINUED | OUTPATIENT
Start: 2024-12-23 | End: 2024-12-23

## 2024-12-23 RX ADMIN — IPRATROPIUM BROMIDE AND ALBUTEROL SULFATE 3 MILLILITER(S): 2.5; .5 SOLUTION RESPIRATORY (INHALATION) at 10:00

## 2024-12-23 RX ADMIN — IPRATROPIUM BROMIDE AND ALBUTEROL SULFATE 3 MILLILITER(S): 2.5; .5 SOLUTION RESPIRATORY (INHALATION) at 09:49

## 2024-12-23 RX ADMIN — IPRATROPIUM BROMIDE AND ALBUTEROL SULFATE 3 MILLILITER(S): 2.5; .5 SOLUTION RESPIRATORY (INHALATION) at 10:23

## 2024-12-23 RX ADMIN — POTASSIUM CHLORIDE 40 MILLIEQUIVALENT(S): 600 TABLET, EXTENDED RELEASE ORAL at 12:53

## 2024-12-23 RX ADMIN — Medication 125 MILLIGRAM(S): at 09:48

## 2024-12-23 RX ADMIN — Medication 2 PUFF(S): at 12:53

## 2024-12-23 NOTE — ED ADULT NURSE NOTE - OBJECTIVE STATEMENT
Pt presents to ED c/o SOB and chest discomfort, for 2 weeks. No distress noted, breathing is unlabored and symmetrical, SpO2 100% on RA, VSS, cardiac monitor in place. MD Rivas at bedside, order for neb treatment and solu-medrol IVP. PMH asthma. Pt states he drank late last night/this morning "a little bit." Safety and comfort measures in place, no other complaints at this time.

## 2024-12-23 NOTE — ED ADULT TRIAGE NOTE - MEANS OF ARRIVAL
ambulatory Cheek-To-Nose Interpolation Flap Text: A decision was made to reconstruct the defect utilizing an interpolation axial flap and a staged reconstruction.  A telfa template was made of the defect.  This telfa template was then used to outline the Cheek-To-Nose Interpolation flap.  The donor area for the pedicle flap was then injected with anesthesia.  The flap was excised through the skin and subcutaneous tissue down to the layer of the underlying musculature.  The interpolation flap was carefully excised within this deep plane to maintain its blood supply.  The edges of the donor site were undermined.   The donor site was closed in a primary fashion.  The pedicle was then rotated into position and sutured.  Once the tube was sutured into place, adequate blood supply was confirmed with blanching and refill.  The pedicle was then wrapped with xeroform gauze and dressed appropriately with a telfa and gauze bandage to ensure continued blood supply and protect the attached pedicle.

## 2024-12-23 NOTE — ED PROVIDER NOTE - PATIENT PORTAL LINK FT
You can access the FollowMyHealth Patient Portal offered by [facility] by registering at the following website: [email or web address]. By joining [employer]’s FollowMyHealth portal, you will also be able to view your health information using other applications (apps) compatible with our system.

## 2024-12-23 NOTE — ED PROVIDER NOTE - OBJECTIVE STATEMENT
Service: [id number] Patient is a 54-year-old male who reports 2-week history of wheezing and shortness of breath consistent with his asthma.  Patient ran out of his inhaler.  Patient also reports chest pain with wheezing.  Patient denies cough.  Patient with no known intubation history of asthma.   Patient also reports drinking 3 beers this morning to help with the symptoms   But denies any daily alcohol use.

## 2024-12-23 NOTE — ED ADULT NURSE NOTE - CHIEF COMPLAINT QUOTE
PT presents to the ED for asthma medications and shortness of breath and chest discomfort for 3 weeks. pt well appearing, with no distress noted in triage, nkda. pt taken for stat EKG. pt also endorses drinking 3 beers today. bgm in triage 88

## 2024-12-23 NOTE — ED PROVIDER NOTE - CLINICAL SUMMARY MEDICAL DECISION MAKING FREE TEXT BOX
53 yo male with known hx of asthma; wheezing on exam; concern for asthma exacerbation; screening ekg, cxr, labs; nebs, steroids and re-eval closely 53 yo male with known hx of asthma; wheezing on exam; concern for asthma exacerbation; screening ekg, cxr, labs; nebs, steroids and re-eval closely    Evelyn DO: patient feeling better; lungs cta; albuterol inhaler; rx for steroids; patient with no further complaints; instructed to f/u with pmd in 1-2 days without fail; strict return precautions given.

## 2024-12-23 NOTE — ED PROVIDER NOTE - NSFOLLOWUPINSTRUCTIONS_ED_ALL_ED_FT
Asma en los adultos  Asthma, Adult  Outline of a person's upper body showing the lungs, with close-ups of two airways, one normal and one narrow.  El asma es erendira enfermedad prolongada (crónica) que causa episodios recurrentes de endurecimiento y estrechamiento de las vías respiratorias inferiores en los pulmones. El estrechamiento es causado por la inflamación y el endurecimiento del músculo liso que rodea las vías respiratorias inferiores.    Los episodios de asma, también llamados ataques de asma o exacerbaciones de asma, pueden causar tos, falta de aliento, dolor en el pecho y que la persona emita silbidos agudos al respirar, sobre todo al exhalar (sibilancias). Las vías respiratorias pueden producir mucosidad adicional causada por la inflamación y la irritación. Mary el ataque, puede ser difícil respirar. Los ataques de asma pueden ser desde leves hasta potencialmente mortales.    El asma no puede curarse, carolina los medicamentos y los cambios en el estilo de chan lo ayudarán a controlar la enfermedad y a tratar los ataques agudos. Es importante mantener el asma santana controlado para que la afección no interfiera con ramos chan diaria.    ¿Cuáles son las causas?  Se mae que la causa de esta afección son factores hereditarios (genéticos) y la exposición a factores ambientales; sin embargo, ramos causa exacta se desconoce.    ¿Qué cosas pueden desencadenar un ataque de asma?    Muchas cosas pueden provocar un ataque de asma o intensificar los síntomas. Estos desencadenantes son diferentes para cada persona. Los factores desencadenantes comunes incluyen los siguientes:  Alérgenos e irritantes mac moho, polvo, caspa de mascotas, cucarachas, polen, contaminación del aire y olores químicos.  Humo del cigarrillo.  Cambios climáticos y aire frío.  El estrés y las respuestas emocionales allen, mac llorar o reír intensamente.  Ciertos medicamentos, mac la aspirina o los betabloqueantes.  Enfermedades infecciosas e inflamatorias, mac gripe, resfrío, neumonía o inflamación de las membranas nasales (rinitis).  Enfermedad de reflujo gastroesofágico (ERGE).  ¿Cuáles son los signos o los síntomas?  Los síntomas pueden aparecer inmediatamente después de la exposición a un desencadenante del asma u horas más tarde, y pueden variar según la persona. Entre los signos y los síntomas más frecuentes, se incluyen los siguientes:  Sibilancias.  Dificultad para respirar (falta de aire).  Tos excesiva mary la noche o temprano por la mañana.  Opresión en el pecho.  Cansancio (fatiga) al realizar actividades que requieren un mínimo esfuerzo.  Dificultad para enunciar oraciones completas.  Escasa tolerancia a los ejercicios.  ¿Cómo se diagnostica?  Esta afección se diagnostica en función de lo siguiente:  Un examen físico y los antecedentes médicos.  Estudios, entre ellos, los siguientes:  Estudios de la función pulmonar para evaluar el flujo de aire en los pulmones.  Pruebas de alergia.  Estudios de diagnóstico por imágenes, mac radiografías.  ¿Cómo se trata?  No hay sridhar, carolina el tratamiento adecuado puede controlar los síntomas. Por lo general, el tratamiento implica lo siguiente:  Identificar y evitar los factores desencadenantes del asma.  Medicamentos inhalados. Se utilizan habitualmente dos tipos para tratar el asma, dependiendo de la gravedad:  Medicamentos de control. Estos ayudan a evitar la aparición de los síntomas de asma. Se sneha todos los días.  Medicamentos de alivio o de rescate de acción rápida. Estos alivian los síntomas de asma rápidamente. Se utilizan cuando es necesario y proporcionan alivio a corto plazo.  Usar otros medicamentos, mac los siguientes:  Medicamentos para las alergias, tales mac antihistamínicos, en alli de que brice ataques de asma ximena causados por alérgenos.  Medicamentos inmunológicos (inmunomoduladores). Estos medicamentos controlan el sistema inmunitario.  Usar oxígeno complementario. Puede que esto sea necesario solamente mary un episodio grave.  Crear un plan de acción para el asma. [place] es erendira planificación por escrito para el control y el tratamiento de los ataques de asma. Staci plan incluye lo siguiente:  Erendira lista de los factores desencadenantes del asma y el modo de evitarlos.  Información sobre el momento en que se deben lucy los medicamentos y cuándo hay que cambiar las dosis.  Instrucciones sobre el uso de un dispositivo llamado espirómetro. El espirómetro es un dispositivo que mide el funcionamiento de los pulmones y la gravedad de ramos asma. Lo ayuda a controlar la enfermedad.  Siga estas instrucciones en ramos casa:  Use los medicamentos de venta ignacia y los recetados solamente mac se lo haya indicado el médico.  Manténgase al día con todas las vacunas que le haya recomendado ramos médico, incluidas las vacunas contra la gripe y contra la neumonía.  Utilice un espirómetro y lleve un registro de brice lecturas de flujo gil.  Entienda y use el plan de acción para el asma a fin de abordar las exacerbaciones del asma.  No fume ni permita que nadie fume en ramos casa.  Comuníquese con un médico si:  Tiene sibilancias, le falta el aire o tiene tos que no mejoran con los medicamentos.  Los medicamentos le causan efectos secundarios, mac erupción cutánea, picazón, hinchazón o dificultad para respirar.  Necesita lucy un medicamento de alivio más de 2 a 3 veces por semana.  Ramos flujo gil se mantiene entre el 50 % y el 79 % de ramos mejor valor personal después de seguir el plan de acción mary 1 hora.  Tiene fiebre y le falta el aire.  Solicite ayuda de inmediato si:  Está empeorando y no responde al tratamiento mary un ataque de asma.  Le falta el aire cuando descansa o cuando hace muy poca actividad física.  Tiene dificultad para comer, beber o hablar.  Siente dolor u opresión en el pecho.  Tiene latidos cardíacos rápidos o palpitaciones.  Tiene los labios o las uñas de un indigo azulado.  Siente que está por desvanecerse, está mareado o se desmaya.  Ramos flujo gil es josselin que el 50 % del mejor valor personal.  Se siente demasiado cansado para respirar con normalidad.  Estos síntomas pueden indicar erendira emergencia. Solicite ayuda de inmediato. Llame al 911.  No espere a elbert si los síntomas desaparecen.  No conduzca por brice propios medios hasta el hospital.  Resumen  El asma es erendira enfermedad prolongada (crónica) que causa episodios recurrentes de estrechamiento de las vías respiratorias. Los episodios de asma, también denominados ataques de asma o exacerbaciones de asma, pueden provocar tos, sibilancias, falta de aire y dolor en el pecho.  El asma no es curable, carolina los medicamentos y los cambios en el estilo de chan pueden ayudar a controlar santana la enfermedad y a evitar las exacerbaciones del asma.  Asegúrese de comprender cómo evitar los desencadenantes y cómo y cuándo usar los medicamentos.  Los ataques de asma pueden ser desde leves hasta potencialmente mortales. Consiga ayuda de inmediato si tiene un ataque de asma y no responde al tratamiento con los medicamentos de rescate habituales.  Esta información no tiene mac fin reemplazar el consejo del médico. Asegúrese de hacerle al médico cualquier pregunta que tenga.

## 2024-12-23 NOTE — ED ADULT TRIAGE NOTE - CHIEF COMPLAINT QUOTE
PT presents to the ED for asthma medications and shortness of breath and chest discomfort for 3 weeks. pt well appearing in triage with no distress noted in triage, nkda. pt taken for stat EKG PT presents to the ED for asthma medications and shortness of breath and chest discomfort for 3 weeks. pt well appearing, with no distress noted in triage, nkda. pt taken for stat EKG. pt also endorses drinking 3 beers today. PT presents to the ED for asthma medications and shortness of breath and chest discomfort for 3 weeks. pt well appearing, with no distress noted in triage, nkda. pt taken for stat EKG. pt also endorses drinking 3 beers today. bgm in triage 88

## 2024-12-23 NOTE — ED ADULT NURSE NOTE - NSFALLHARMRISKINTERV_ED_ALL_ED
Assistance OOB with selected safe patient handling equipment if applicable/Assistance with ambulation/Communicate risk of Fall with Harm to all staff, patient, and family/Encourage patient to sit up slowly, dangle for a short time, stand at bedside before walking/Monitor gait and stability/Monitor for mental status changes and reorient to person, place, and time, as needed/Provide visual cue: red socks, yellow wristband, yellow gown, etc/Reinforce activity limits and safety measures with patient and family/Toileting schedule using arm’s reach rule for commode and bathroom/Use of alarms - bed, stretcher, chair and/or video monitoring/Bed in lowest position, wheels locked, appropriate side rails in place/Call bell, personal items and telephone in reach/Instruct patient to call for assistance before getting out of bed/chair/stretcher/Non-slip footwear applied when patient is off stretcher/[place] to call system/Physically safe environment - no spills, clutter or unnecessary equipment/Purposeful Proactive Rounding/Room/bathroom lighting operational, light cord in reach

## 2025-01-06 ENCOUNTER — EMERGENCY (EMERGENCY)
Facility: HOSPITAL | Age: 55
LOS: 0 days | Discharge: HOME | End: 2025-01-06
Attending: EMERGENCY MEDICINE
Payer: MEDICAID

## 2025-01-06 VITALS
RESPIRATION RATE: 18 BRPM | SYSTOLIC BLOOD PRESSURE: 151 MMHG | TEMPERATURE: 99 F | OXYGEN SATURATION: 98 % | WEIGHT: 149.91 LBS | DIASTOLIC BLOOD PRESSURE: 92 MMHG | HEIGHT: 64 IN | HEART RATE: 112 BPM

## 2025-01-06 VITALS
TEMPERATURE: 99 F | DIASTOLIC BLOOD PRESSURE: 98 MMHG | RESPIRATION RATE: 18 BRPM | SYSTOLIC BLOOD PRESSURE: 135 MMHG | OXYGEN SATURATION: 97 % | HEART RATE: 94 BPM

## 2025-01-06 DIAGNOSIS — J45.901 UNSPECIFIED ASTHMA WITH (ACUTE) EXACERBATION: ICD-10-CM

## 2025-01-06 DIAGNOSIS — F10.920 ALCOHOL USE, UNSPECIFIED WITH INTOXICATION, UNCOMPLICATED: ICD-10-CM

## 2025-01-06 DIAGNOSIS — R06.02 SHORTNESS OF BREATH: ICD-10-CM

## 2025-01-06 DIAGNOSIS — R05.9 COUGH, UNSPECIFIED: ICD-10-CM

## 2025-01-06 DIAGNOSIS — R50.9 FEVER, UNSPECIFIED: ICD-10-CM

## 2025-01-06 DIAGNOSIS — R00.0 TACHYCARDIA, UNSPECIFIED: ICD-10-CM

## 2025-01-06 DIAGNOSIS — K00.0 ANODONTIA: ICD-10-CM

## 2025-01-06 LAB
FLUAV AG NPH QL: SIGNIFICANT CHANGE UP
FLUBV AG NPH QL: SIGNIFICANT CHANGE UP
RSV RNA NPH QL NAA+NON-PROBE: SIGNIFICANT CHANGE UP
SARS-COV-2 RNA SPEC QL NAA+PROBE: SIGNIFICANT CHANGE UP

## 2025-01-06 PROCEDURE — 93005 ELECTROCARDIOGRAM TRACING: CPT

## 2025-01-06 PROCEDURE — 93010 ELECTROCARDIOGRAM REPORT: CPT

## 2025-01-06 PROCEDURE — 71045 X-RAY EXAM CHEST 1 VIEW: CPT

## 2025-01-06 PROCEDURE — 94640 AIRWAY INHALATION TREATMENT: CPT

## 2025-01-06 PROCEDURE — 99285 EMERGENCY DEPT VISIT HI MDM: CPT | Mod: 25

## 2025-01-06 PROCEDURE — 99284 EMERGENCY DEPT VISIT MOD MDM: CPT

## 2025-01-06 PROCEDURE — 71045 X-RAY EXAM CHEST 1 VIEW: CPT | Mod: 26

## 2025-01-06 PROCEDURE — 0241U: CPT

## 2025-01-06 RX ORDER — TIOTROPIUM BROMIDE MONOHYDRATE 18 UG/1
2 CAPSULE ORAL; RESPIRATORY (INHALATION) ONCE
Refills: 0 | Status: COMPLETED | OUTPATIENT
Start: 2025-01-06 | End: 2025-01-06

## 2025-01-06 RX ORDER — PREDNISONE 5 MG
50 TABLET ORAL ONCE
Refills: 0 | Status: COMPLETED | OUTPATIENT
Start: 2025-01-06 | End: 2025-01-06

## 2025-01-06 RX ORDER — ALBUTEROL SULFATE 90 UG/1
2 INHALANT RESPIRATORY (INHALATION) ONCE
Refills: 0 | Status: COMPLETED | OUTPATIENT
Start: 2025-01-06 | End: 2025-01-06

## 2025-01-06 RX ORDER — PREDNISONE 5 MG
2 TABLET ORAL
Qty: 10 | Refills: 0
Start: 2025-01-06 | End: 2025-01-10

## 2025-01-06 RX ADMIN — TIOTROPIUM BROMIDE MONOHYDRATE 2 PUFF(S): 18 CAPSULE ORAL; RESPIRATORY (INHALATION) at 19:42

## 2025-01-06 RX ADMIN — ALBUTEROL SULFATE 2 PUFF(S): 90 INHALANT RESPIRATORY (INHALATION) at 19:29

## 2025-01-06 RX ADMIN — Medication 50 MILLIGRAM(S): at 19:29

## 2025-01-06 NOTE — ED ADULT NURSE NOTE - NSFALLUNIVINTERV_ED_ALL_ED
Bed/Stretcher in lowest position, wheels locked, appropriate side rails in place/Call bell, personal items and telephone in reach/Instruct patient to call for assistance before getting out of bed/chair/stretcher/Non-slip footwear applied when patient is off stretcher/[place] to call system/Physically safe environment - no spills, clutter or unnecessary equipment/Purposeful proactive rounding/Room/bathroom lighting operational, light cord in reach

## 2025-01-06 NOTE — ED PROVIDER NOTE - PHYSICAL EXAMINATION
· CONSTITUTIONAL:  male adult, alert, mild respiratory distress, nontoxic, no sentence shortening. Mildly intoxicated.  · ENMT: Oropharynx clear, mucus membranes slightly dry. Multiple teeth absent.  · EYES: PERRL, EOMI.   · CARDIAC: Mildly tachycardic. Normal radial pulse.  · RESPIRATORY: No retractions or obvious wheezing. Mild tachypnea.  · GASTROINTESTINAL: Bowel sounds hypoactive, normal pitch. Abdomen soft, nontender.  · GENITOURINARY:  deferred; no flank nor CVAT.  · MUSCULOSKELETAL: MAEx4, no focal swelling/tenderness.  · NEUROLOGICAL: Alert and mildly intoxicated. No focal, motor, or sensory deficits.  · SKIN: No obvious signs of trauma.  ?Tactile warmth. No rash. · CONSTITUTIONAL:  male adult, alert, mild respiratory distress, nontoxic, no sentence shortening. Mildly intoxicated.  · ENMT: Oropharynx clear, mucus membranes slightly dry. Multiple teeth absent.  · EYES: PERRL, EOMI.   · CARDIAC: Mildly tachycardic. Normal radial pulse.  · RESPIRATORY: No retractions or obvious wheezing. Mild tachypnea. Mildly decreased breath sounds  · GASTROINTESTINAL: Bowel sounds hypoactive, normal pitch. Abdomen soft, nontender.  · GENITOURINARY:  deferred; no flank nor CVAT.  · MUSCULOSKELETAL: MAEx4, no focal extremity swelling/tenderness.  · NEUROLOGICAL: Alert and mildly intoxicated. No focal, motor, or sensory deficits. CNs intact, speech mildly slurred.  · SKIN: No obvious signs of trauma.  ?Tactile warmth. No rash.

## 2025-01-06 NOTE — ED PROVIDER NOTE - PATIENT PORTAL LINK FT
You can access the FollowMyHealth Patient Portal offered by [facility] by registering at the following website: [email or web address]. By joining [employer]’s FollowMyHealth portal, you will also be able to view your health information using other applications (apps) compatible with our system.

## 2025-01-06 NOTE — ED PROVIDER NOTE - CLINICAL SUMMARY MEDICAL DECISION MAKING FREE TEXT BOX
53 y/o Niuean-speaking male with PMHx of asthma with hx of past intubation, COPD, chronic alcoholic presents to the ED BIBEMS with [facility] c/o SOB, productive cough, and subjective fever today. Admits to ETOH abuse earlier today with current mild intoxication.  Plan: EKG, CXR, viral swab, repeat oral temperature, albuterol and Spiriva inhaler treatment, fall precautions. Monitor, observe, reassess. 55 y/o Khmer-speaking male with PMHx of asthma with hx of past intubation, COPD, chronic alcoholic presents to the ED BIBEMS with [facility]CPD c/o SOB, productive cough, and subjective fever today. Admits to ETOH abuse earlier today with current mild intoxication.  Plan: EKG, CXR, viral swab, repeat oral temperature, albuterol and Spiriva inhaler treatment, fall precautions. Monitor, observe, reassess.    21;33, C MD Bautista:  Chest x-ray wet read personally by me: LUCAS.  Viral swab negative.  Informed by ED RN that patient passed ambulation trial.  Patient reassessed: In good respiratory comfort, no active complaints.  Lungs + improved A/E, no distress, no active wheezing.  Pt informed he's cleared for DC, expressed his understanding & agrees with DC home, PMD outpt f/u this week. Will send e-script. for Prednisone, pt was given MDIs for Albuterol & Spiriva. 55 y/o Romanian-speaking HM, PMHx of asthma/COPD + past intubations, chronic alcoholic; BIBEMS with [facility]D c/o SOB, productive cough, and subjective fever today. Admits to ETOH abuse earlier today with current mild intoxication.  Pt in mild respir. discomfort/distress, not hypoxic. Decereased BS diffusely.    Plan: EKG, CXR, viral swab, repeat oral temperature, albuterol and Spiriva inhaler treatment, po Prednisone, fall precautions. Monitor, observe, reassess.    21;33, C MD Bautista:  Chest x-ray wet read personally by me: LUCAS.  Viral swab negative.  Informed by ED RN that patient passed ambulation trial.  Patient reassessed: in good respiratory comfort, no active complaints.  Lungs + improved A/E, no distress, no active wheezing.  Pt informed he's cleared for DC, expressed his understanding & agrees with DC home, PMD outpt f/u this week. Will send e-script. for Prednisone, pt was given MDIs for Albuterol & Spiriva.

## 2025-01-06 NOTE — ED PROVIDER NOTE - NSFOLLOWUPINSTRUCTIONS_ED_ALL_ED_FT
Albuterol inhaler given to you: 2 puffs every 6 hours as needed for shortness of breath.    Spiriva inhaler given to you: 2 puffs each morning.    Continue your regular medications as per routine.      Follow-up with your own doctor this week.    Avoid abusing alcohol.

## 2025-01-06 NOTE — ED ADULT NURSE NOTE - OBJECTIVE STATEMENT
Pt is 54y male, A&Ox4, breathing unlabored, presents to ED with c/o difficulty taking a deep breath. Pt denies fevers, N/V/D. denies chest pain. Pt is well appearing. has no other complaints. Pt provided sandwich and drink as requested. Ambulatory and ambulated well with RN to bathroom.

## 2025-01-06 NOTE — ED ADULT NURSE NOTE - PAIN RATING/NUMBER SCALE (0-10): ACTIVITY
Addended by: RANDEE SUE on: 10/12/2020 08:55 AM     Modules accepted: Orders     0 (no pain/absence of nonverbal indicators of pain)

## 2025-01-06 NOTE — ED PROVIDER NOTE - OBJECTIVE STATEMENT
55 y/o Sami-speaking male with PMHx of asthma with hx of past intubation, COPD, chronic alcoholic presents to the ED BIBEMS with [facility] c/o SOB, productive cough, and subjective fever today. Admits to ETOH abuse earlier today.  ED eval 12/23 for SOB/ asthma exacerbation, improved with medical treatment of nebs and steroids; CXR/EKG negative. NKDA. 55 y/o Malay-speaking HM with PMHx of asthma/COPD, + past intubation, + chronic alcoholic; BIBEMS with [facility] to ED c/o SOB, productive cough, and subjective fever today. No chest pain, N/V/D, known ill contact. Admits to ETOH abuse earlier today.  ED eval 12/23 for SOB/ asthma exacerbation, improved with medical treatment of nebs and steroids, CXR/EKG negative & DC'ed.   NKDA.

## 2025-01-06 NOTE — ED PROVIDER NOTE - OTHER RECORDS SUMMARY FREE TEXT FOR MDM OBTAINED AND REVIEWED OLD RECORDS QUESTION
ED eval 12/23 for SOB/ asthma exacerbation, improved with medical treatment of nebs and steroids, CXR/EKG negative & DC'ed.

## 2025-01-06 NOTE — ED ADULT NURSE REASSESSMENT NOTE - NS ED NURSE REASSESS COMMENT FT1
Pt approved for taxi voucher to address on file, 12 allan stubbs pt DC and waiting for taxi in waiting room

## 2025-01-06 NOTE — ED ADULT NURSE NOTE - CHIEF COMPLAINT QUOTE
Pt bibems  and [facility] #[id number] from home c/o sob x 5 days. Endorses drinking alcohol. Tachy to 112bpm and O2 98% RA at triage. Stat ekg and monitor.

## 2025-01-16 ENCOUNTER — INPATIENT (INPATIENT)
Facility: HOSPITAL | Age: 55
LOS: 6 days | Discharge: HOME | DRG: 141 | End: 2025-01-23
Attending: HOSPITALIST | Admitting: STUDENT IN AN ORGANIZED HEALTH CARE EDUCATION/TRAINING PROGRAM
Payer: MEDICAID

## 2025-01-16 VITALS
HEIGHT: 64 IN | OXYGEN SATURATION: 95 % | TEMPERATURE: 97 F | SYSTOLIC BLOOD PRESSURE: 149 MMHG | DIASTOLIC BLOOD PRESSURE: 79 MMHG | RESPIRATION RATE: 22 BRPM | HEART RATE: 106 BPM | WEIGHT: 117.73 LBS

## 2025-01-16 DIAGNOSIS — J45.902 UNSPECIFIED ASTHMA WITH STATUS ASTHMATICUS: ICD-10-CM

## 2025-01-16 PROBLEM — F10.10 ALCOHOL ABUSE, UNCOMPLICATED: Chronic | Status: ACTIVE | Noted: 2025-01-08

## 2025-01-16 LAB
ANION GAP SERPL CALC-SCNC: 5 MMOL/L — SIGNIFICANT CHANGE UP (ref 5–17)
ANISOCYTOSIS BLD QL: SLIGHT — SIGNIFICANT CHANGE UP
APTT BLD: 38.9 SEC — HIGH (ref 24.5–35.6)
BASOPHILS # BLD AUTO: 0 K/UL — SIGNIFICANT CHANGE UP (ref 0–0.2)
BASOPHILS NFR BLD AUTO: 0 % — SIGNIFICANT CHANGE UP (ref 0–2)
BUN SERPL-MCNC: 9 MG/DL — SIGNIFICANT CHANGE UP (ref 7–23)
CALCIUM SERPL-MCNC: 9.3 MG/DL — SIGNIFICANT CHANGE UP (ref 8.5–10.1)
CHLORIDE SERPL-SCNC: 104 MMOL/L — SIGNIFICANT CHANGE UP (ref 96–108)
CO2 SERPL-SCNC: 27 MMOL/L — SIGNIFICANT CHANGE UP (ref 22–31)
CREAT SERPL-MCNC: 0.7 MG/DL — SIGNIFICANT CHANGE UP (ref 0.5–1.3)
EGFR: 111 ML/MIN/1.73M2 — SIGNIFICANT CHANGE UP
EOSINOPHIL # BLD AUTO: 2.46 K/UL — HIGH (ref 0–0.5)
EOSINOPHIL NFR BLD AUTO: 25 % — HIGH (ref 0–6)
FLUAV AG NPH QL: SIGNIFICANT CHANGE UP
FLUBV AG NPH QL: SIGNIFICANT CHANGE UP
GLUCOSE SERPL-MCNC: 94 MG/DL — SIGNIFICANT CHANGE UP (ref 70–99)
HCT VFR BLD CALC: 45.8 % — SIGNIFICANT CHANGE UP (ref 39–50)
HGB BLD-MCNC: 14.8 G/DL — SIGNIFICANT CHANGE UP (ref 13–17)
HYPOCHROMIA BLD QL: SLIGHT — SIGNIFICANT CHANGE UP
INR BLD: 0.92 RATIO — SIGNIFICANT CHANGE UP (ref 0.85–1.16)
LACTATE SERPL-SCNC: 1.1 MMOL/L — SIGNIFICANT CHANGE UP (ref 0.7–2)
LYMPHOCYTES # BLD AUTO: 1.58 K/UL — SIGNIFICANT CHANGE UP (ref 1–3.3)
LYMPHOCYTES # BLD AUTO: 16 % — SIGNIFICANT CHANGE UP (ref 13–44)
MANUAL SMEAR VERIFICATION: SIGNIFICANT CHANGE UP
MCHC RBC-ENTMCNC: 28.8 PG — SIGNIFICANT CHANGE UP (ref 27–34)
MCHC RBC-ENTMCNC: 32.3 G/DL — SIGNIFICANT CHANGE UP (ref 32–36)
MCV RBC AUTO: 89.1 FL — SIGNIFICANT CHANGE UP (ref 80–100)
MONOCYTES # BLD AUTO: 0.99 K/UL — HIGH (ref 0–0.9)
MONOCYTES NFR BLD AUTO: 10 % — SIGNIFICANT CHANGE UP (ref 2–14)
NEUTROPHILS # BLD AUTO: 4.43 K/UL — SIGNIFICANT CHANGE UP (ref 1.8–7.4)
NEUTROPHILS NFR BLD AUTO: 45 % — SIGNIFICANT CHANGE UP (ref 43–77)
NRBC # BLD: 0 /100 WBCS — SIGNIFICANT CHANGE UP (ref 0–0)
NRBC # BLD: SIGNIFICANT CHANGE UP /100 WBCS (ref 0–0)
NRBC BLD-RTO: 0 /100 WBCS — SIGNIFICANT CHANGE UP (ref 0–0)
NRBC BLD-RTO: SIGNIFICANT CHANGE UP /100 WBCS (ref 0–0)
PLAT MORPH BLD: NORMAL — SIGNIFICANT CHANGE UP
PLATELET # BLD AUTO: 345 K/UL — SIGNIFICANT CHANGE UP (ref 150–400)
POTASSIUM SERPL-MCNC: 4.3 MMOL/L — SIGNIFICANT CHANGE UP (ref 3.5–5.3)
POTASSIUM SERPL-SCNC: 4.3 MMOL/L — SIGNIFICANT CHANGE UP (ref 3.5–5.3)
PROTHROM AB SERPL-ACNC: 10.6 SEC — SIGNIFICANT CHANGE UP (ref 9.9–13.4)
RBC # BLD: 5.14 M/UL — SIGNIFICANT CHANGE UP (ref 4.2–5.8)
RBC # FLD: 17.2 % — HIGH (ref 10.3–14.5)
RBC BLD AUTO: ABNORMAL
RSV RNA NPH QL NAA+NON-PROBE: SIGNIFICANT CHANGE UP
SARS-COV-2 RNA SPEC QL NAA+PROBE: SIGNIFICANT CHANGE UP
SODIUM SERPL-SCNC: 136 MMOL/L — SIGNIFICANT CHANGE UP (ref 135–145)
TROPONIN I, HIGH SENSITIVITY RESULT: 13.59 NG/L — SIGNIFICANT CHANGE UP
VARIANT LYMPHS # BLD: 4 % — SIGNIFICANT CHANGE UP (ref 0–6)
VARIANT LYMPHS NFR BLD MANUAL: 4 % — SIGNIFICANT CHANGE UP (ref 0–6)
WBC # BLD: 9.85 K/UL — SIGNIFICANT CHANGE UP (ref 3.8–10.5)
WBC # FLD AUTO: 9.85 K/UL — SIGNIFICANT CHANGE UP (ref 3.8–10.5)

## 2025-01-16 PROCEDURE — 83036 HEMOGLOBIN GLYCOSYLATED A1C: CPT

## 2025-01-16 PROCEDURE — 80061 LIPID PANEL: CPT

## 2025-01-16 PROCEDURE — 87641 MR-STAPH DNA AMP PROBE: CPT

## 2025-01-16 PROCEDURE — 82962 GLUCOSE BLOOD TEST: CPT

## 2025-01-16 PROCEDURE — 84484 ASSAY OF TROPONIN QUANT: CPT

## 2025-01-16 PROCEDURE — 71275 CT ANGIOGRAPHY CHEST: CPT | Mod: MC

## 2025-01-16 PROCEDURE — 81001 URINALYSIS AUTO W/SCOPE: CPT

## 2025-01-16 PROCEDURE — 87640 STAPH A DNA AMP PROBE: CPT

## 2025-01-16 PROCEDURE — 99223 1ST HOSP IP/OBS HIGH 75: CPT

## 2025-01-16 PROCEDURE — 82140 ASSAY OF AMMONIA: CPT

## 2025-01-16 PROCEDURE — 95816 EEG AWAKE AND DROWSY: CPT

## 2025-01-16 PROCEDURE — 83605 ASSAY OF LACTIC ACID: CPT

## 2025-01-16 PROCEDURE — 94002 VENT MGMT INPAT INIT DAY: CPT

## 2025-01-16 PROCEDURE — 93458 L HRT ARTERY/VENTRICLE ANGIO: CPT

## 2025-01-16 PROCEDURE — 93005 ELECTROCARDIOGRAM TRACING: CPT

## 2025-01-16 PROCEDURE — 85730 THROMBOPLASTIN TIME PARTIAL: CPT

## 2025-01-16 PROCEDURE — C1769: CPT

## 2025-01-16 PROCEDURE — 93010 ELECTROCARDIOGRAM REPORT: CPT

## 2025-01-16 PROCEDURE — 93306 TTE W/DOPPLER COMPLETE: CPT

## 2025-01-16 PROCEDURE — C1894: CPT

## 2025-01-16 PROCEDURE — 84100 ASSAY OF PHOSPHORUS: CPT

## 2025-01-16 PROCEDURE — 94760 N-INVAS EAR/PLS OXIMETRY 1: CPT

## 2025-01-16 PROCEDURE — 71250 CT THORAX DX C-: CPT | Mod: MC

## 2025-01-16 PROCEDURE — 36600 WITHDRAWAL OF ARTERIAL BLOOD: CPT

## 2025-01-16 PROCEDURE — 70450 CT HEAD/BRAIN W/O DYE: CPT | Mod: MC

## 2025-01-16 PROCEDURE — 82803 BLOOD GASES ANY COMBINATION: CPT

## 2025-01-16 PROCEDURE — 80307 DRUG TEST PRSMV CHEM ANLYZR: CPT

## 2025-01-16 PROCEDURE — 85025 COMPLETE CBC W/AUTO DIFF WBC: CPT

## 2025-01-16 PROCEDURE — 83735 ASSAY OF MAGNESIUM: CPT

## 2025-01-16 PROCEDURE — 74176 CT ABD & PELVIS W/O CONTRAST: CPT | Mod: MC

## 2025-01-16 PROCEDURE — 82550 ASSAY OF CK (CPK): CPT

## 2025-01-16 PROCEDURE — 94640 AIRWAY INHALATION TREATMENT: CPT

## 2025-01-16 PROCEDURE — 71045 X-RAY EXAM CHEST 1 VIEW: CPT | Mod: 26

## 2025-01-16 PROCEDURE — C1887: CPT

## 2025-01-16 PROCEDURE — 31500 INSERT EMERGENCY AIRWAY: CPT

## 2025-01-16 PROCEDURE — 71045 X-RAY EXAM CHEST 1 VIEW: CPT

## 2025-01-16 PROCEDURE — 80048 BASIC METABOLIC PNL TOTAL CA: CPT

## 2025-01-16 PROCEDURE — 80053 COMPREHEN METABOLIC PANEL: CPT

## 2025-01-16 PROCEDURE — 94003 VENT MGMT INPAT SUBQ DAY: CPT

## 2025-01-16 PROCEDURE — 36415 COLL VENOUS BLD VENIPUNCTURE: CPT

## 2025-01-16 PROCEDURE — 85027 COMPLETE CBC AUTOMATED: CPT

## 2025-01-16 PROCEDURE — 99285 EMERGENCY DEPT VISIT HI MDM: CPT

## 2025-01-16 PROCEDURE — 85610 PROTHROMBIN TIME: CPT

## 2025-01-16 RX ORDER — ACETAMINOPHEN 160 MG/5ML
650 SUSPENSION ORAL EVERY 6 HOURS
Refills: 0 | Status: DISCONTINUED | OUTPATIENT
Start: 2025-01-16 | End: 2025-01-23

## 2025-01-16 RX ORDER — MAGNESIUM, ALUMINUM HYDROXIDE 200-225/5
30 SUSPENSION, ORAL (FINAL DOSE FORM) ORAL EVERY 4 HOURS
Refills: 0 | Status: DISCONTINUED | OUTPATIENT
Start: 2025-01-16 | End: 2025-01-23

## 2025-01-16 RX ORDER — IPRATROPIUM BROMIDE AND ALBUTEROL SULFATE .5; 2.5 MG/3ML; MG/3ML
3 SOLUTION RESPIRATORY (INHALATION) EVERY 6 HOURS
Refills: 0 | Status: DISCONTINUED | OUTPATIENT
Start: 2025-01-16 | End: 2025-01-23

## 2025-01-16 RX ORDER — SODIUM CHLORIDE 9 G/ML
2000 INJECTION, SOLUTION INTRAVENOUS ONCE
Refills: 0 | Status: COMPLETED | OUTPATIENT
Start: 2025-01-16 | End: 2025-01-16

## 2025-01-16 RX ORDER — ONDANSETRON 4 MG/1
4 TABLET, ORALLY DISINTEGRATING ORAL EVERY 8 HOURS
Refills: 0 | Status: DISCONTINUED | OUTPATIENT
Start: 2025-01-16 | End: 2025-01-23

## 2025-01-16 RX ORDER — BACTERIOSTATIC SODIUM CHLORIDE 0.9 %
1650 VIAL (ML) INJECTION ONCE
Refills: 0 | Status: DISCONTINUED | OUTPATIENT
Start: 2025-01-16 | End: 2025-01-16

## 2025-01-16 RX ORDER — METHYLPREDNISOLONE ACETATE 40 MG/ML
125 VIAL (ML) INJECTION ONCE
Refills: 0 | Status: COMPLETED | OUTPATIENT
Start: 2025-01-16 | End: 2025-01-16

## 2025-01-16 RX ORDER — ACETAMINOPHEN, DIPHENHYDRAMINE HCL, PHENYLEPHRINE HCL 325; 25; 5 MG/1; MG/1; MG/1
3 TABLET ORAL AT BEDTIME
Refills: 0 | Status: DISCONTINUED | OUTPATIENT
Start: 2025-01-16 | End: 2025-01-18

## 2025-01-16 RX ORDER — ALBUTEROL 90 MCG
2 AEROSOL REFILL (GRAM) INHALATION EVERY 4 HOURS
Refills: 0 | Status: DISCONTINUED | OUTPATIENT
Start: 2025-01-16 | End: 2025-01-23

## 2025-01-16 RX ORDER — IPRATROPIUM BROMIDE AND ALBUTEROL SULFATE .5; 2.5 MG/3ML; MG/3ML
3 SOLUTION RESPIRATORY (INHALATION)
Refills: 0 | Status: COMPLETED | OUTPATIENT
Start: 2025-01-16 | End: 2025-01-16

## 2025-01-16 RX ORDER — METHYLPREDNISOLONE ACETATE 40 MG/ML
60 VIAL (ML) INJECTION EVERY 6 HOURS
Refills: 0 | Status: DISCONTINUED | OUTPATIENT
Start: 2025-01-16 | End: 2025-01-17

## 2025-01-16 RX ORDER — AZITHROMYCIN DIHYDRATE 500 MG/1
500 TABLET, FILM COATED ORAL
Refills: 0 | Status: DISCONTINUED | OUTPATIENT
Start: 2025-01-16 | End: 2025-01-18

## 2025-01-16 RX ORDER — MAGNESIUM SULFATE 0.8 MEQ/ML
2 AMPUL (ML) INJECTION ONCE
Refills: 0 | Status: COMPLETED | OUTPATIENT
Start: 2025-01-16 | End: 2025-01-16

## 2025-01-16 RX ADMIN — Medication 125 MILLIGRAM(S): at 14:35

## 2025-01-16 RX ADMIN — SODIUM CHLORIDE 2000 MILLILITER(S): 9 INJECTION, SOLUTION INTRAVENOUS at 14:44

## 2025-01-16 RX ADMIN — Medication 150 GRAM(S): at 14:44

## 2025-01-16 RX ADMIN — IPRATROPIUM BROMIDE AND ALBUTEROL SULFATE 3 MILLILITER(S): .5; 2.5 SOLUTION RESPIRATORY (INHALATION) at 14:44

## 2025-01-16 RX ADMIN — IPRATROPIUM BROMIDE AND ALBUTEROL SULFATE 3 MILLILITER(S): .5; 2.5 SOLUTION RESPIRATORY (INHALATION) at 14:56

## 2025-01-16 RX ADMIN — IPRATROPIUM BROMIDE AND ALBUTEROL SULFATE 3 MILLILITER(S): .5; 2.5 SOLUTION RESPIRATORY (INHALATION) at 14:35

## 2025-01-16 NOTE — H&P ADULT - ASSESSMENT
53 y/o male came to  due to having SOB for the past week. Patient states that he doesn't feel too bad at rest, but when he exerts himself he becomes very SOB. Patient states that he does not F/U outpatient and/or take meds to help prevent his asthma/COPD exacerbations because he does not have insurance. Patient states that he feels better after the breathing treatments and steroids.    #Asthma/COPD Exacerbation  - Albuterol, Duoneb, steroids, and azithromycin    #Dehydration  - IV fluids

## 2025-01-16 NOTE — ED ADULT NURSE NOTE - NS ED NURSE REPORT GIVEN DT
Elmer Roque is a 89 yo M presenting in phone FU to 3.14.24 virtual visit.     Per 3.14.24 note:        -Seen in 10.23 for ?CAP s/p Augmentin with me   -Got better, cough and dyspnea worsened in 12.2023 prompting admission to [facility] found to have hypoxic RF, pleural effusions s/p diuresis   Found to have +sacral ulcer   TTE unremarkable ?HFpEF   discharged on home o2 - 4L      last cxr 12.18.23 reviewed     weight 179 on 3.9.24   179 lbs at admit   169 lbs at discharge back in 12.2023      Discharged in 12.2023 on Lasix 40 daily.      Discussed a plan of:   -dc amlo to liberate too soft VALARIE   -continue lasix 20   -uptitrate lasix to 40 mg for weight gain 2 lbs in 2 days to goal weight 175   -FU 4-6 wks virtually          *Reports 179.2 last week at 3.14.24   *3.16.24  lbs 1500 ml UOP   *3.17.24 was 177 lbs - 925 ml UOP   *3.23.24 was 181 lbs   *3.26.24 178.8   *3.28.24 180.8 lbs      16-Jan-2025 21:59

## 2025-01-16 NOTE — H&P ADULT - NSHPPHYSICALEXAM_GEN_ALL_CORE
Vital Signs Last 24 Hrs  T(C): 37 (16 Jan 2025 22:44), Max: 37 (16 Jan 2025 22:44)  T(F): 98.6 (16 Jan 2025 22:44), Max: 98.6 (16 Jan 2025 22:44)  HR: 102 (16 Jan 2025 22:44) (95 - 127)  BP: 141/108 (16 Jan 2025 22:44) (115/97 - 149/79)  BP(mean): 93 (16 Jan 2025 21:00) (93 - 105)  RR: 18 (16 Jan 2025 22:44) (16 - 25)  SpO2: 97% (16 Jan 2025 22:44) (95% - 100%)    Parameters below as of 16 Jan 2025 22:44  Patient On (Oxygen Delivery Method): room air    CONSTITUTIONAL: Wheezing  Head: Normocephalic  Eyes: No conjunctival icterus    RESP: Tachypneic   CV: Tachycardia  GI: ND  Extremities: No pitting edema  NEURO: CN II-XII intact   PSYCH: AOx3

## 2025-01-16 NOTE — ED ADULT NURSE NOTE - NSFALLUNIVINTERV_ED_ALL_ED
Bed/Stretcher in lowest position, wheels locked, appropriate side rails in place/Call bell, personal items and telephone in reach/Instruct patient to call for assistance before getting out of bed/chair/stretcher/Non-slip footwear applied when patient is off stretcher/[place] to call system/Physically safe environment - no spills, clutter or unnecessary equipment/Purposeful proactive rounding/Room/bathroom lighting operational, light cord in reach

## 2025-01-16 NOTE — ED ADULT NURSE NOTE - OBJECTIVE STATEMENT
53 y/o male presents to ED c/o asthma exacerbation. Pt reports having increase in difficulty breathing over last few days. No meds taken PTA. Pt noted to be tachypneic and ;labored breathing. Pt placed on monitor, IV placed, meds given as per orders in MAR. Pt reports breathing starting to get better after nebulizer treatments. Dr. Harmeet quiroz.

## 2025-01-16 NOTE — ED PROVIDER NOTE - CLINICAL SUMMARY MEDICAL DECISION MAKING FREE TEXT BOX
Patient still with profound wheezing throughout all lung fields despite multiple rounds of breathing treatments steroids based off past history and poor compliance will require admission to the hospital.  My clinical judgment patient high risk of decompensation of sent home he agrees to plan of care for admission

## 2025-01-16 NOTE — ED PROVIDER NOTE - OBJECTIVE STATEMENT
Patient presents to ED describing shortness of breath wheezing nonproductive cough states is like his prior asthma exacerbations.  States noncompliant with his asthma medications.  Daily drinker.  No headache.  No chest pain.  No hemoptysis.  No leg swelling.  No abdominal pain.  Denies any other drug abuse.

## 2025-01-16 NOTE — ED STATDOCS - PROGRESS NOTE DETAILS
#[id number]  54-year-old male history of asthma presents to the emergency department for shortness of breath for the last few days.  Did not take anything for symptoms no allergies to meds here patient is visibly in respiratory distress audible wheezing retracting will send to dorene Hernandez M.D., Attending Physician

## 2025-01-16 NOTE — ED ADULT NURSE REASSESSMENT NOTE - NS ED NURSE REASSESS COMMENT FT1
Covering RN Erika, pt AxOx4 admitted to telemetry, attempted report x1. Pt denies complaints. Mild wheezing noted. Bilateral 20G intact and patent
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
## 2025-01-17 LAB
ALBUMIN SERPL ELPH-MCNC: 3.5 G/DL — SIGNIFICANT CHANGE UP (ref 3.3–5)
ALP SERPL-CCNC: 71 U/L — SIGNIFICANT CHANGE UP (ref 40–120)
ALT FLD-CCNC: 27 U/L — SIGNIFICANT CHANGE UP (ref 12–78)
ANION GAP SERPL CALC-SCNC: 9 MMOL/L — SIGNIFICANT CHANGE UP (ref 5–17)
AST SERPL-CCNC: 42 U/L — HIGH (ref 15–37)
BILIRUB SERPL-MCNC: 0.7 MG/DL — SIGNIFICANT CHANGE UP (ref 0.2–1.2)
BUN SERPL-MCNC: 13 MG/DL — SIGNIFICANT CHANGE UP (ref 7–23)
CALCIUM SERPL-MCNC: 8.9 MG/DL — SIGNIFICANT CHANGE UP (ref 8.5–10.1)
CHLORIDE SERPL-SCNC: 104 MMOL/L — SIGNIFICANT CHANGE UP (ref 96–108)
CO2 SERPL-SCNC: 24 MMOL/L — SIGNIFICANT CHANGE UP (ref 22–31)
CREAT SERPL-MCNC: 0.67 MG/DL — SIGNIFICANT CHANGE UP (ref 0.5–1.3)
EGFR: 112 ML/MIN/1.73M2 — SIGNIFICANT CHANGE UP
GLUCOSE SERPL-MCNC: 135 MG/DL — HIGH (ref 70–99)
HCT VFR BLD CALC: 37.6 % — LOW (ref 39–50)
HGB BLD-MCNC: 12.5 G/DL — LOW (ref 13–17)
MAGNESIUM SERPL-MCNC: 2 MG/DL — SIGNIFICANT CHANGE UP (ref 1.6–2.6)
MCHC RBC-ENTMCNC: 28.9 PG — SIGNIFICANT CHANGE UP (ref 27–34)
MCHC RBC-ENTMCNC: 33.2 G/DL — SIGNIFICANT CHANGE UP (ref 32–36)
MCV RBC AUTO: 87 FL — SIGNIFICANT CHANGE UP (ref 80–100)
PHOSPHATE SERPL-MCNC: 3.7 MG/DL — SIGNIFICANT CHANGE UP (ref 2.5–4.5)
PLATELET # BLD AUTO: 292 K/UL — SIGNIFICANT CHANGE UP (ref 150–400)
POTASSIUM SERPL-MCNC: 3.8 MMOL/L — SIGNIFICANT CHANGE UP (ref 3.5–5.3)
POTASSIUM SERPL-SCNC: 3.8 MMOL/L — SIGNIFICANT CHANGE UP (ref 3.5–5.3)
PROT SERPL-MCNC: 7 GM/DL — SIGNIFICANT CHANGE UP (ref 6–8.3)
RBC # BLD: 4.32 M/UL — SIGNIFICANT CHANGE UP (ref 4.2–5.8)
RBC # FLD: 16.9 % — HIGH (ref 10.3–14.5)
SODIUM SERPL-SCNC: 137 MMOL/L — SIGNIFICANT CHANGE UP (ref 135–145)
WBC # BLD: 2.96 K/UL — LOW (ref 3.8–10.5)
WBC # FLD AUTO: 2.96 K/UL — LOW (ref 3.8–10.5)

## 2025-01-17 PROCEDURE — 93010 ELECTROCARDIOGRAM REPORT: CPT | Mod: 76

## 2025-01-17 PROCEDURE — 71045 X-RAY EXAM CHEST 1 VIEW: CPT | Mod: 26

## 2025-01-17 PROCEDURE — 99232 SBSQ HOSP IP/OBS MODERATE 35: CPT

## 2025-01-17 RX ORDER — METHYLPREDNISOLONE ACETATE 40 MG/ML
60 VIAL (ML) INJECTION EVERY 8 HOURS
Refills: 0 | Status: DISCONTINUED | OUTPATIENT
Start: 2025-01-17 | End: 2025-01-19

## 2025-01-17 RX ORDER — PROPOFOL 10 MG/ML
10 INJECTION, EMULSION INTRAVENOUS
Qty: 1000 | Refills: 0 | Status: DISCONTINUED | OUTPATIENT
Start: 2025-01-17 | End: 2025-01-19

## 2025-01-17 RX ADMIN — IPRATROPIUM BROMIDE AND ALBUTEROL SULFATE 3 MILLILITER(S): .5; 2.5 SOLUTION RESPIRATORY (INHALATION) at 20:49

## 2025-01-17 RX ADMIN — IPRATROPIUM BROMIDE AND ALBUTEROL SULFATE 3 MILLILITER(S): .5; 2.5 SOLUTION RESPIRATORY (INHALATION) at 14:43

## 2025-01-17 RX ADMIN — Medication 60 MILLIGRAM(S): at 00:52

## 2025-01-17 RX ADMIN — AZITHROMYCIN DIHYDRATE 500 MILLIGRAM(S): 500 TABLET, FILM COATED ORAL at 22:14

## 2025-01-17 RX ADMIN — IPRATROPIUM BROMIDE AND ALBUTEROL SULFATE 3 MILLILITER(S): .5; 2.5 SOLUTION RESPIRATORY (INHALATION) at 09:37

## 2025-01-17 RX ADMIN — PROPOFOL 3.25 MICROGRAM(S)/KG/MIN: 10 INJECTION, EMULSION INTRAVENOUS at 23:44

## 2025-01-17 RX ADMIN — Medication 60 MILLIGRAM(S): at 05:34

## 2025-01-17 RX ADMIN — Medication 60 MILLIGRAM(S): at 11:40

## 2025-01-17 RX ADMIN — AZITHROMYCIN DIHYDRATE 500 MILLIGRAM(S): 500 TABLET, FILM COATED ORAL at 00:52

## 2025-01-17 RX ADMIN — Medication 60 MILLIGRAM(S): at 22:15

## 2025-01-17 RX ADMIN — IPRATROPIUM BROMIDE AND ALBUTEROL SULFATE 3 MILLILITER(S): .5; 2.5 SOLUTION RESPIRATORY (INHALATION) at 02:07

## 2025-01-17 RX ADMIN — ACETAMINOPHEN, DIPHENHYDRAMINE HCL, PHENYLEPHRINE HCL 3 MILLIGRAM(S): 325; 25; 5 TABLET ORAL at 22:14

## 2025-01-17 RX ADMIN — Medication 2 MILLIGRAM(S): at 23:56

## 2025-01-17 NOTE — DIETITIAN INITIAL EVALUATION ADULT - NUTRITION DIAGNOSIS
70 y/o F with a significant PMHx of carpal tunnel syndrome, HTN, hypothyroid, lymphedema, presents to the ED for lightheadedness and dizziness. Found to be hypotensive with elevated troponin. Admitted for NSTEMI. yes...

## 2025-01-17 NOTE — DIETITIAN INITIAL EVALUATION ADULT - PHYSICAL ASSESSMENT CALF
DISPLAY PLAN FREE TEXT
severe

## 2025-01-17 NOTE — DIETITIAN INITIAL EVALUATION ADULT - PERTINENT MEDS FT
MEDICATIONS  (STANDING):  albuterol/ipratropium for Nebulization 3 milliLiter(s) Nebulizer every 6 hours  azithromycin   Tablet 500 milliGRAM(s) Oral <User Schedule>  influenza   Vaccine 0.5 milliLiter(s) IntraMuscular once  methylPREDNISolone sodium succinate Injectable 60 milliGRAM(s) IV Push every 6 hours    MEDICATIONS  (PRN):  acetaminophen     Tablet .. 650 milliGRAM(s) Oral every 6 hours PRN Temp greater or equal to 38C (100.4F), Mild Pain (1 - 3)  albuterol    90 MICROgram(s) HFA Inhaler 2 Puff(s) Inhalation every 4 hours PRN Shortness of Breath and/or Wheezing  aluminum hydroxide/magnesium hydroxide/simethicone Suspension 30 milliLiter(s) Oral every 4 hours PRN Dyspepsia  melatonin 3 milliGRAM(s) Oral at bedtime PRN Insomnia  ondansetron Injectable 4 milliGRAM(s) IV Push every 8 hours PRN Nausea and/or Vomiting

## 2025-01-17 NOTE — PROGRESS NOTE ADULT - ASSESSMENT
55 y/o male came to  due to having SOB for the past week. Patient states that he doesn't feel too bad at rest, but when he exerts himself he becomes very SOB. Patient states that he does not F/U outpatient and/or take meds to help prevent his asthma/COPD exacerbations because he does not have insurance. Patient states that he feels better after the breathing treatments and steroids.    #Asthma/COPD Exacerbation  - Continue Albuterol, Duoneb,   steroids - s/p 125mg IV Solumedrol  60mg IV Solumedrol q6h - Wean to Solumedrol 60mg IV q8h    Continue azithromycin  -Consult pulm recommendations appreciated     #Dehydration  - appears euvolemic   -S/P IV hydration   -Monitor off IV fluids  -Encourage PO intake  53 y/o male came to  due to having SOB for the past week. Patient states that he doesn't feel too bad at rest, but when he exerts himself he becomes very SOB. Patient states that he does not F/U outpatient and/or take meds to help prevent his asthma/COPD exacerbations because he does not have insurance. Patient states that he feels better after the breathing treatments and steroids.    #Asthma/COPD Exacerbation  - Continue Albuterol, Duoneb,   steroids - s/p 125mg IV Solumedrol  60mg IV Solumedrol q6h - Wean to Solumedrol 60mg IV q8h    Continue azithromycin  -Consult pulm recommendations appreciated   Covid Flu RSV negative     #Dehydration  - appears euvolemic   -S/P IV hydration   -Monitor off IV fluids  -Encourage PO intake

## 2025-01-17 NOTE — DIETITIAN INITIAL EVALUATION ADULT - OTHER INFO
53 y/o male came to  due to having SOB for the past week. Patient states that he doesn't feel too bad at rest, but when he exerts himself he becomes very SOB. Patient states that he does not F/U outpatient and/or take meds to help prevent his asthma/COPD exacerbations because he does not have insurance. Patient states that he feels better after the breathing treatments and steroids.  Admit for COPD exac     Is Nepalese speaking; utilized [employer] (ID# - [id number]). RD observed breakfast tray and consumed >75% (pancakes, hard boiled eggs, yogurt, juice). Reports UBW of ~130# x ? time frame; Bed scale wt of 115# taken by RD on 1/17/25. Wt hx as per EMR: 103# (bed scale wt taken by RD on 11/4/24); noted w/ desirable wt gain of 12# x 2 mo. NFPE reveals varying degrees of mild-severe muscle/ fat wasting - appears thin and nickie. C/w regular diet as tolerated. Add ensure + HP shake BID (350kcal, 20g protein) to optimize nutritional needs- pt is receptive. Encourage protein-rich foods, maximize food preferences. Please provide 100 mg thiamine, 1 mg folic acid, MVI w/ minerals daily to ensure 100% RDA met 2/2 ETOH abuse and malnutrition. See below for other recommendations.     55 y/o male came to  due to having SOB for the past week. Patient states that he doesn't feel too bad at rest, but when he exerts himself he becomes very SOB. Patient states that he does not F/U outpatient and/or take meds to help prevent his asthma/COPD exacerbations because he does not have insurance. Patient states that he feels better after the breathing treatments and steroids.  Admit for COPD exac     Is Ivorian speaking; utilized [employer] (ID# - [id number]). RD observed breakfast tray and consumed >75% (pancakes, hard boiled eggs, yogurt, juice). Reports UBW of ~130# x ? time frame; Bed scale wt of 115# taken by RD on 1/17/25. Wt hx as per EMR: 103# (bed scale wt taken by RD on 11/4/24); noted w/ desirable wt gain of 12# x 2 mo. NFPE reveals varying degrees of mild-severe muscle/ fat wasting - appears thin and nickie. C/w regular diet as tolerated. Add ensure + HP shake BID (350kcal, 20g protein) to optimize nutritional needs- pt is receptive. Encourage protein-rich foods, maximize food preferences. Please provide 100 mg thiamine, 1 mg folic acid, MVI w/ minerals daily to ensure 100% RDA met 2/2 ETOH abuse and malnutrition. Strongly recommend social work consult to confirm qualification for [facility] Program.*** See below for other recommendations.

## 2025-01-17 NOTE — DIETITIAN INITIAL EVALUATION ADULT - ADD RECOMMEND
1) C/w regular diet as tolerated  2) Add ensure + HP shake BID (350kcal, 20g protein)  3) Monitor bowel movements, if no BM for >3 days, consider implementing bowel regimen.  4) Encourage protein-rich foods, maximize food preferences  5) Please provide 100 mg thiamine, 1 mg folic acid, MVI w/ minerals daily to ensure 100% RDA met 2/2 ETOH abuse and malnutrition  6) Monitor lytes/ min and replete prn.  7) Confirm goals of care regarding nutrition support  RD will continue to monitor PO intake, labs, hydration, and wt prn.   1) C/w regular diet as tolerated  2) Add ensure + HP shake BID (350kcal, 20g protein)  3) Monitor bowel movements, if no BM for >3 days, consider implementing bowel regimen.  4) Encourage protein-rich foods, maximize food preferences  5) Please provide 100 mg thiamine, 1 mg folic acid, MVI w/ minerals daily to ensure 100% RDA met 2/2 ETOH abuse and malnutrition  6) Monitor lytes/ min and replete prn.  7) Confirm goals of care regarding nutrition support  8) Strongly recommend social work consult to confirm qualification for [facility] Program****  RD will continue to monitor PO intake, labs, hydration, and wt prn.

## 2025-01-17 NOTE — CHART NOTE - NSCHARTNOTEFT_GEN_A_CORE
[place] called. Pt. is atraumatically intubated via direct visualization of vocal cords. Et tube 7.5 secured at [id number] at lip. Et. tube placement confirmed via bilateral breath sounds and positive end tidal CO2.  No difficulty noted during intubation.

## 2025-01-17 NOTE — DIETITIAN INITIAL EVALUATION ADULT - ORAL INTAKE PTA/DIET HISTORY
Is English speaking; utilized [employer] (ID# - [id number]). Lives at home w/ "friends," all cook/grocery shop for household w/o difficulty. Endorses "not great" appetite x 1 day 2/2 SOB. W/ language barrier - unable to obtain 24-hr recall at this time. Likely consuming <75% of ENN x > 1 mo 2/2 SOB, hx of ETOH  Is Romanian speaking; utilized [employer] (ID# - [id number]). Lives at home w/ "friends," all cook/grocery shop for household w/o difficulty (? accuracy - as per social work, on food stamps & w/ food insecurity/lack of transportation as per EMR). Endorses "not great" appetite x 1 day 2/2 SOB. W/ language barrier - unable to obtain 24-hr recall at this time. Likely consuming <75% of ENN x > 1 mo 2/2 SOB, hx of ETOH

## 2025-01-17 NOTE — PATIENT PROFILE ADULT - FALL HARM RISK - HARM RISK INTERVENTIONS
Assistance with ambulation/Assistance OOB with selected safe patient handling equipment/Communicate Risk of Fall with Harm to all staff/Discuss with provider need for PT consult/Monitor for mental status changes/Monitor gait and stability/Reinforce activity limits and safety measures with patient and family/Tailored Fall Risk Interventions/Toileting schedule using arm’s reach rule for commode and bathroom/Use of alarms - bed, chair and/or voice tab/Visual Cue: Yellow wristband and red socks/Bed in lowest position, wheels locked, appropriate side rails in place/Call bell, personal items and telephone in reach/Instruct patient to call for assistance before getting out of bed or chair/Non-slip footwear when patient is out of bed/[place] to call system/Physically safe environment - no spills, clutter or unnecessary equipment/Purposeful Proactive Rounding/Room/bathroom lighting operational, light cord in reach

## 2025-01-17 NOTE — DIETITIAN INITIAL EVALUATION ADULT - PERTINENT LABORATORY DATA
01-17    137  |  104  |  13  ----------------------------<  135[H]  3.8   |  24  |  0.67    Ca    8.9      17 Jan 2025 06:57  Phos  3.7     01-17  Mg     2.0     01-17    TPro  7.0  /  Alb  3.5  /  TBili  0.7  /  DBili  x   /  AST  42[H]  /  ALT  27  /  AlkPhos  71  01-17

## 2025-01-17 NOTE — PROGRESS NOTE ADULT - SUBJECTIVE AND OBJECTIVE BOX
HOSPITALIST ATTENDING PROGRESS NOTE    Chart and meds reviewed.      HPI 1/16/25  53 y/o male came to  due to having SOB for the past week. Patient states that he doesn't feel too bad at rest, but when he exerts himself he becomes very SOB. Patient states that he does not F/U outpatient and/or take meds to help prevent his asthma/COPD exacerbations because he does not have insurance. Patient states that he feels better after the breathing treatments and steroids.    Subjective: Patient seen and examined. No acute respiratory distress. SPO2 96% on room air. Still significantly wheezing bilateral. Continue duonebs and IV steroids       Additional results/Imaging, I have personally reviewed:    LABS:                            12.5   2.96  )-----------( 292      ( 17 Jan 2025 06:57 )             37.6     01-17    137  |  104  |  13  ----------------------------<  135[H]  3.8   |  24  |  0.67    Ca    8.9      17 Jan 2025 06:57  Phos  3.7     01-17  Mg     2.0     01-17    TPro  7.0  /  Alb  3.5  /  TBili  0.7  /  DBili  x   /  AST  42[H]  /  ALT  27  /  AlkPhos  71  01-17        LIVER FUNCTIONS - ( 17 Jan 2025 06:57 )  Alb: 3.5 g/dL / Pro: 7.0 gm/dL / ALK PHOS: 71 U/L / ALT: 27 U/L / AST: 42 U/L / GGT: x           PT/INR - ( 16 Jan 2025 14:37 )   PT: 10.6 sec;   INR: 0.92 ratio         PTT - ( 16 Jan 2025 14:37 )  PTT:38.9 sec  Urinalysis Basic - ( 17 Jan 2025 06:57 )    Color: x / Appearance: x / SG: x / pH: x  Gluc: 135 mg/dL / Ketone: x  / Bili: x / Urobili: x   Blood: x / Protein: x / Nitrite: x   Leuk Esterase: x / RBC: x / WBC x   Sq Epi: x / Non Sq Epi: x / Bacteria: x              All other systems reviewed and found to be negative with the exception of what has been described above.    MEDICATIONS  (STANDING):  albuterol/ipratropium for Nebulization 3 milliLiter(s) Nebulizer every 6 hours  azithromycin   Tablet 500 milliGRAM(s) Oral <User Schedule>  influenza   Vaccine 0.5 milliLiter(s) IntraMuscular once  methylPREDNISolone sodium succinate Injectable 60 milliGRAM(s) IV Push every 6 hours    MEDICATIONS  (PRN):  acetaminophen     Tablet .. 650 milliGRAM(s) Oral every 6 hours PRN Temp greater or equal to 38C (100.4F), Mild Pain (1 - 3)  albuterol    90 MICROgram(s) HFA Inhaler 2 Puff(s) Inhalation every 4 hours PRN Shortness of Breath and/or Wheezing  aluminum hydroxide/magnesium hydroxide/simethicone Suspension 30 milliLiter(s) Oral every 4 hours PRN Dyspepsia  melatonin 3 milliGRAM(s) Oral at bedtime PRN Insomnia  ondansetron Injectable 4 milliGRAM(s) IV Push every 8 hours PRN Nausea and/or Vomiting      VITALS:  T(F): 97.9 (01-17-25 @ 08:00), Max: 99 (01-17-25 @ 00:44)  HR: 110 (01-17-25 @ 14:44) (86 - 110)  BP: 136/87 (01-17-25 @ 08:00) (119/80 - 141/108)  RR: 18 (01-17-25 @ 08:00) (16 - 18)  SpO2: 94% (01-17-25 @ 14:44) (94% - 100%)  Wt(kg): --    I&O's Summary      CAPILLARY BLOOD GLUCOSE          PHYSICAL EXAM:  CONSTITUTIONAL: Wheezing  Head: Normocephalic  Eyes: No conjunctival icterus    RESP: Tachypneic   CV: Tachycardia  GI: ND  Extremities: No pitting edema  NEURO: CN II-XII intact   PSYCH: AOx3      CULTURES:      Telemetry, personally reviewed
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
## 2025-01-18 DIAGNOSIS — J45.901 UNSPECIFIED ASTHMA WITH (ACUTE) EXACERBATION: ICD-10-CM

## 2025-01-18 DIAGNOSIS — I46.9 CARDIAC ARREST, CAUSE UNSPECIFIED: ICD-10-CM

## 2025-01-18 DIAGNOSIS — I21.4 NON-ST ELEVATION (NSTEMI) MYOCARDIAL INFARCTION: ICD-10-CM

## 2025-01-18 LAB
A1C WITH ESTIMATED AVERAGE GLUCOSE RESULT: 5.7 % — HIGH (ref 4–5.6)
ADD ON TEST-SPECIMEN IN LAB: SIGNIFICANT CHANGE UP
ADD ON TEST-SPECIMEN IN LAB: SIGNIFICANT CHANGE UP
ALBUMIN SERPL ELPH-MCNC: 3.2 G/DL — LOW (ref 3.3–5)
ALBUMIN SERPL ELPH-MCNC: 3.6 G/DL — SIGNIFICANT CHANGE UP (ref 3.3–5)
ALP SERPL-CCNC: 65 U/L — SIGNIFICANT CHANGE UP (ref 40–120)
ALP SERPL-CCNC: 96 U/L — SIGNIFICANT CHANGE UP (ref 40–120)
ALT FLD-CCNC: 125 U/L — HIGH (ref 12–78)
ALT FLD-CCNC: 143 U/L — HIGH (ref 12–78)
AMMONIA BLD-MCNC: 46 UMOL/L — HIGH (ref 11–32)
AMPHET UR-MCNC: NEGATIVE — SIGNIFICANT CHANGE UP
ANION GAP SERPL CALC-SCNC: 10 MMOL/L — SIGNIFICANT CHANGE UP (ref 5–17)
ANION GAP SERPL CALC-SCNC: 5 MMOL/L — SIGNIFICANT CHANGE UP (ref 5–17)
ANION GAP SERPL CALC-SCNC: 7 MMOL/L — SIGNIFICANT CHANGE UP (ref 5–17)
APPEARANCE UR: CLEAR — SIGNIFICANT CHANGE UP
APTT BLD: 24.3 SEC — LOW (ref 24.5–35.6)
APTT BLD: 41.3 SEC — HIGH (ref 24.5–35.6)
AST SERPL-CCNC: 428 U/L — HIGH (ref 15–37)
AST SERPL-CCNC: 500 U/L — HIGH (ref 15–37)
BACTERIA # UR AUTO: NEGATIVE /HPF — SIGNIFICANT CHANGE UP
BARBITURATES UR SCN-MCNC: NEGATIVE — SIGNIFICANT CHANGE UP
BASE EXCESS BLDA CALC-SCNC: -3.5 MMOL/L — LOW (ref -2–3)
BASOPHILS # BLD AUTO: 0 K/UL — SIGNIFICANT CHANGE UP (ref 0–0.2)
BASOPHILS NFR BLD AUTO: 0 % — SIGNIFICANT CHANGE UP (ref 0–2)
BENZODIAZ UR-MCNC: POSITIVE — SIGNIFICANT CHANGE UP
BILIRUB SERPL-MCNC: 0.4 MG/DL — SIGNIFICANT CHANGE UP (ref 0.2–1.2)
BILIRUB SERPL-MCNC: 0.5 MG/DL — SIGNIFICANT CHANGE UP (ref 0.2–1.2)
BILIRUB UR-MCNC: NEGATIVE — SIGNIFICANT CHANGE UP
BLOOD GAS COMMENTS ARTERIAL: SIGNIFICANT CHANGE UP
BUN SERPL-MCNC: 11 MG/DL — SIGNIFICANT CHANGE UP (ref 7–23)
BUN SERPL-MCNC: 16 MG/DL — SIGNIFICANT CHANGE UP (ref 7–23)
BUN SERPL-MCNC: 16 MG/DL — SIGNIFICANT CHANGE UP (ref 7–23)
CALCIUM SERPL-MCNC: 8.2 MG/DL — LOW (ref 8.5–10.1)
CALCIUM SERPL-MCNC: 8.3 MG/DL — LOW (ref 8.5–10.1)
CALCIUM SERPL-MCNC: 8.5 MG/DL — SIGNIFICANT CHANGE UP (ref 8.5–10.1)
CAST: SIGNIFICANT CHANGE UP /LPF
CHLORIDE SERPL-SCNC: 109 MMOL/L — HIGH (ref 96–108)
CHLORIDE SERPL-SCNC: 109 MMOL/L — HIGH (ref 96–108)
CHLORIDE SERPL-SCNC: 110 MMOL/L — HIGH (ref 96–108)
CK SERPL-CCNC: 127 U/L — SIGNIFICANT CHANGE UP (ref 26–308)
CK SERPL-CCNC: 129 U/L — SIGNIFICANT CHANGE UP (ref 26–308)
CO2 SERPL-SCNC: 21 MMOL/L — LOW (ref 22–31)
CO2 SERPL-SCNC: 23 MMOL/L — SIGNIFICANT CHANGE UP (ref 22–31)
CO2 SERPL-SCNC: 25 MMOL/L — SIGNIFICANT CHANGE UP (ref 22–31)
COCAINE METAB.OTHER UR-MCNC: NEGATIVE — SIGNIFICANT CHANGE UP
COLOR SPEC: YELLOW — SIGNIFICANT CHANGE UP
COMMENT - URINE: SIGNIFICANT CHANGE UP
CREAT SERPL-MCNC: 0.71 MG/DL — SIGNIFICANT CHANGE UP (ref 0.5–1.3)
CREAT SERPL-MCNC: 0.76 MG/DL — SIGNIFICANT CHANGE UP (ref 0.5–1.3)
CREAT SERPL-MCNC: 1.02 MG/DL — SIGNIFICANT CHANGE UP (ref 0.5–1.3)
DIFF PNL FLD: ABNORMAL
EGFR: 108 ML/MIN/1.73M2 — SIGNIFICANT CHANGE UP
EGFR: 110 ML/MIN/1.73M2 — SIGNIFICANT CHANGE UP
EGFR: 88 ML/MIN/1.73M2 — SIGNIFICANT CHANGE UP
EOSINOPHIL # BLD AUTO: 0 K/UL — SIGNIFICANT CHANGE UP (ref 0–0.5)
EOSINOPHIL NFR BLD AUTO: 0 % — SIGNIFICANT CHANGE UP (ref 0–6)
ESTIMATED AVERAGE GLUCOSE: 117 MG/DL — HIGH (ref 68–114)
ETHANOL SERPL-MCNC: <10 MG/DL — SIGNIFICANT CHANGE UP (ref 0–10)
FENTANYL UR QL SCN: NEGATIVE — SIGNIFICANT CHANGE UP
GAS PNL BLDA: SIGNIFICANT CHANGE UP
GLUCOSE BLDC GLUCOMTR-MCNC: 144 MG/DL — HIGH (ref 70–99)
GLUCOSE BLDC GLUCOMTR-MCNC: 158 MG/DL — HIGH (ref 70–99)
GLUCOSE BLDC GLUCOMTR-MCNC: 165 MG/DL — HIGH (ref 70–99)
GLUCOSE SERPL-MCNC: 159 MG/DL — HIGH (ref 70–99)
GLUCOSE SERPL-MCNC: 161 MG/DL — HIGH (ref 70–99)
GLUCOSE SERPL-MCNC: 190 MG/DL — HIGH (ref 70–99)
GLUCOSE UR QL: 500 MG/DL
HCO3 BLDA-SCNC: 23 MMOL/L — SIGNIFICANT CHANGE UP (ref 21–28)
HCT VFR BLD CALC: 33.3 % — LOW (ref 39–50)
HCT VFR BLD CALC: 33.5 % — LOW (ref 39–50)
HCT VFR BLD CALC: 38.7 % — LOW (ref 39–50)
HGB BLD-MCNC: 11 G/DL — LOW (ref 13–17)
HGB BLD-MCNC: 11.1 G/DL — LOW (ref 13–17)
HGB BLD-MCNC: 12.5 G/DL — LOW (ref 13–17)
INR BLD: 0.92 RATIO — SIGNIFICANT CHANGE UP (ref 0.85–1.16)
KETONES UR-MCNC: NEGATIVE MG/DL — SIGNIFICANT CHANGE UP
LACTATE SERPL-SCNC: 2 MMOL/L — SIGNIFICANT CHANGE UP (ref 0.7–2)
LACTATE SERPL-SCNC: 2.1 MMOL/L — HIGH (ref 0.7–2)
LACTATE SERPL-SCNC: 2.5 MMOL/L — HIGH (ref 0.7–2)
LACTATE SERPL-SCNC: 5.3 MMOL/L — CRITICAL HIGH (ref 0.7–2)
LEUKOCYTE ESTERASE UR-ACNC: NEGATIVE — SIGNIFICANT CHANGE UP
LYMPHOCYTES # BLD AUTO: 16 % — SIGNIFICANT CHANGE UP (ref 13–44)
LYMPHOCYTES # BLD AUTO: 2.9 K/UL — SIGNIFICANT CHANGE UP (ref 1–3.3)
MAGNESIUM SERPL-MCNC: 1.9 MG/DL — SIGNIFICANT CHANGE UP (ref 1.6–2.6)
MAGNESIUM SERPL-MCNC: 2.1 MG/DL — SIGNIFICANT CHANGE UP (ref 1.6–2.6)
MANUAL SMEAR VERIFICATION: SIGNIFICANT CHANGE UP
MCHC RBC-ENTMCNC: 28.9 PG — SIGNIFICANT CHANGE UP (ref 27–34)
MCHC RBC-ENTMCNC: 28.9 PG — SIGNIFICANT CHANGE UP (ref 27–34)
MCHC RBC-ENTMCNC: 29.3 PG — SIGNIFICANT CHANGE UP (ref 27–34)
MCHC RBC-ENTMCNC: 32.3 G/DL — SIGNIFICANT CHANGE UP (ref 32–36)
MCHC RBC-ENTMCNC: 32.8 G/DL — SIGNIFICANT CHANGE UP (ref 32–36)
MCHC RBC-ENTMCNC: 33.3 G/DL — SIGNIFICANT CHANGE UP (ref 32–36)
MCV RBC AUTO: 87.9 FL — SIGNIFICANT CHANGE UP (ref 80–100)
MCV RBC AUTO: 87.9 FL — SIGNIFICANT CHANGE UP (ref 80–100)
MCV RBC AUTO: 89.4 FL — SIGNIFICANT CHANGE UP (ref 80–100)
METHADONE UR-MCNC: NEGATIVE — SIGNIFICANT CHANGE UP
MONOCYTES # BLD AUTO: 0.54 K/UL — SIGNIFICANT CHANGE UP (ref 0–0.9)
MONOCYTES NFR BLD AUTO: 3 % — SIGNIFICANT CHANGE UP (ref 2–14)
MRSA PCR RESULT.: SIGNIFICANT CHANGE UP
NEUTROPHILS # BLD AUTO: 14.66 K/UL — HIGH (ref 1.8–7.4)
NEUTROPHILS NFR BLD AUTO: 80 % — HIGH (ref 43–77)
NEUTS BAND # BLD: 1 % — SIGNIFICANT CHANGE UP (ref 0–8)
NEUTS BAND NFR BLD: 1 % — SIGNIFICANT CHANGE UP (ref 0–8)
NITRITE UR-MCNC: NEGATIVE — SIGNIFICANT CHANGE UP
NRBC # BLD: 0 /100 WBCS — SIGNIFICANT CHANGE UP (ref 0–0)
NRBC # BLD: SIGNIFICANT CHANGE UP /100 WBCS (ref 0–0)
NRBC BLD-RTO: 0 /100 WBCS — SIGNIFICANT CHANGE UP (ref 0–0)
NRBC BLD-RTO: SIGNIFICANT CHANGE UP /100 WBCS (ref 0–0)
OPIATES UR-MCNC: NEGATIVE — SIGNIFICANT CHANGE UP
PCO2 BLDA: 44 MMHG — SIGNIFICANT CHANGE UP (ref 35–48)
PCP SPEC-MCNC: SIGNIFICANT CHANGE UP
PCP UR-MCNC: NEGATIVE — SIGNIFICANT CHANGE UP
PH BLDA: 7.32 — LOW (ref 7.35–7.45)
PH UR: 6 — SIGNIFICANT CHANGE UP (ref 5–8)
PHOSPHATE SERPL-MCNC: 2.4 MG/DL — LOW (ref 2.5–4.5)
PHOSPHATE SERPL-MCNC: 4.7 MG/DL — HIGH (ref 2.5–4.5)
PLAT MORPH BLD: NORMAL — SIGNIFICANT CHANGE UP
PLATELET # BLD AUTO: 233 K/UL — SIGNIFICANT CHANGE UP (ref 150–400)
PLATELET # BLD AUTO: 249 K/UL — SIGNIFICANT CHANGE UP (ref 150–400)
PLATELET # BLD AUTO: 276 K/UL — SIGNIFICANT CHANGE UP (ref 150–400)
PO2 BLDA: 354 MMHG — HIGH (ref 83–108)
POTASSIUM SERPL-MCNC: 3.4 MMOL/L — LOW (ref 3.5–5.3)
POTASSIUM SERPL-MCNC: 3.5 MMOL/L — SIGNIFICANT CHANGE UP (ref 3.5–5.3)
POTASSIUM SERPL-MCNC: 3.6 MMOL/L — SIGNIFICANT CHANGE UP (ref 3.5–5.3)
POTASSIUM SERPL-SCNC: 3.4 MMOL/L — LOW (ref 3.5–5.3)
POTASSIUM SERPL-SCNC: 3.5 MMOL/L — SIGNIFICANT CHANGE UP (ref 3.5–5.3)
POTASSIUM SERPL-SCNC: 3.6 MMOL/L — SIGNIFICANT CHANGE UP (ref 3.5–5.3)
PROT SERPL-MCNC: 6.3 GM/DL — SIGNIFICANT CHANGE UP (ref 6–8.3)
PROT SERPL-MCNC: 7.3 GM/DL — SIGNIFICANT CHANGE UP (ref 6–8.3)
PROT UR-MCNC: 100 MG/DL
PROTHROM AB SERPL-ACNC: 10.6 SEC — SIGNIFICANT CHANGE UP (ref 9.9–13.4)
RBC # BLD: 3.79 M/UL — LOW (ref 4.2–5.8)
RBC # BLD: 3.81 M/UL — LOW (ref 4.2–5.8)
RBC # BLD: 4.33 M/UL — SIGNIFICANT CHANGE UP (ref 4.2–5.8)
RBC # FLD: 17.4 % — HIGH (ref 10.3–14.5)
RBC # FLD: 17.7 % — HIGH (ref 10.3–14.5)
RBC # FLD: 18.1 % — HIGH (ref 10.3–14.5)
RBC BLD AUTO: NORMAL — SIGNIFICANT CHANGE UP
RBC CASTS # UR COMP ASSIST: 0 /HPF — SIGNIFICANT CHANGE UP (ref 0–4)
S AUREUS DNA NOSE QL NAA+PROBE: DETECTED
SAO2 % BLDA: 99 % — HIGH (ref 94–98)
SODIUM SERPL-SCNC: 139 MMOL/L — SIGNIFICANT CHANGE UP (ref 135–145)
SODIUM SERPL-SCNC: 140 MMOL/L — SIGNIFICANT CHANGE UP (ref 135–145)
SODIUM SERPL-SCNC: 140 MMOL/L — SIGNIFICANT CHANGE UP (ref 135–145)
SP GR SPEC: 1.01 — SIGNIFICANT CHANGE UP (ref 1–1.03)
SQUAMOUS # UR AUTO: 0 /HPF — SIGNIFICANT CHANGE UP (ref 0–5)
THC UR QL: NEGATIVE — SIGNIFICANT CHANGE UP
TROPONIN I, HIGH SENSITIVITY RESULT: 408.62 NG/L — HIGH
TROPONIN I, HIGH SENSITIVITY RESULT: 5.94 NG/L — SIGNIFICANT CHANGE UP
TROPONIN I, HIGH SENSITIVITY RESULT: 768.03 NG/L — HIGH
UROBILINOGEN FLD QL: 0.2 MG/DL — SIGNIFICANT CHANGE UP (ref 0.2–1)
WBC # BLD: 13.99 K/UL — HIGH (ref 3.8–10.5)
WBC # BLD: 18.1 K/UL — HIGH (ref 3.8–10.5)
WBC # BLD: 9.18 K/UL — SIGNIFICANT CHANGE UP (ref 3.8–10.5)
WBC # FLD AUTO: 13.99 K/UL — HIGH (ref 3.8–10.5)
WBC # FLD AUTO: 18.1 K/UL — HIGH (ref 3.8–10.5)
WBC # FLD AUTO: 9.18 K/UL — SIGNIFICANT CHANGE UP (ref 3.8–10.5)
WBC UR QL: 0 /HPF — SIGNIFICANT CHANGE UP (ref 0–5)

## 2025-01-18 PROCEDURE — 93010 ELECTROCARDIOGRAM REPORT: CPT

## 2025-01-18 PROCEDURE — 71045 X-RAY EXAM CHEST 1 VIEW: CPT | Mod: 26

## 2025-01-18 PROCEDURE — 99291 CRITICAL CARE FIRST HOUR: CPT

## 2025-01-18 PROCEDURE — 74176 CT ABD & PELVIS W/O CONTRAST: CPT | Mod: 26

## 2025-01-18 PROCEDURE — 93306 TTE W/DOPPLER COMPLETE: CPT | Mod: 26

## 2025-01-18 PROCEDURE — 70450 CT HEAD/BRAIN W/O DYE: CPT | Mod: 26

## 2025-01-18 PROCEDURE — 99292 CRITICAL CARE ADDL 30 MIN: CPT

## 2025-01-18 PROCEDURE — 71275 CT ANGIOGRAPHY CHEST: CPT | Mod: 26

## 2025-01-18 PROCEDURE — 95816 EEG AWAKE AND DROWSY: CPT | Mod: 26

## 2025-01-18 PROCEDURE — 71250 CT THORAX DX C-: CPT | Mod: 26

## 2025-01-18 PROCEDURE — 99223 1ST HOSP IP/OBS HIGH 75: CPT

## 2025-01-18 RX ORDER — SODIUM CHLORIDE 9 G/ML
1000 INJECTION, SOLUTION INTRAVENOUS
Refills: 0 | Status: DISCONTINUED | OUTPATIENT
Start: 2025-01-18 | End: 2025-01-19

## 2025-01-18 RX ORDER — POTASSIUM PHOSPHATE, MONOBASIC POTASSIUM PHOSPHATE, DIBASIC 236; 224 MG/ML; MG/ML
15 INJECTION, SOLUTION INTRAVENOUS ONCE
Refills: 0 | Status: COMPLETED | OUTPATIENT
Start: 2025-01-18 | End: 2025-01-18

## 2025-01-18 RX ORDER — DM/PSEUDOEPHED/ACETAMINOPHEN 10-30-250
15 CAPSULE ORAL ONCE
Refills: 0 | Status: DISCONTINUED | OUTPATIENT
Start: 2025-01-18 | End: 2025-01-19

## 2025-01-18 RX ORDER — SENNOSIDES 8.6 MG
2 TABLET ORAL ONCE
Refills: 0 | Status: COMPLETED | OUTPATIENT
Start: 2025-01-18 | End: 2025-01-18

## 2025-01-18 RX ORDER — ASPIRIN 81 MG/1
81 TABLET, COATED ORAL DAILY
Refills: 0 | Status: DISCONTINUED | OUTPATIENT
Start: 2025-01-18 | End: 2025-01-23

## 2025-01-18 RX ORDER — SODIUM CHLORIDE 9 G/ML
1000 INJECTION, SOLUTION INTRAVENOUS ONCE
Refills: 0 | Status: COMPLETED | OUTPATIENT
Start: 2025-01-18 | End: 2025-01-18

## 2025-01-18 RX ORDER — SENNOSIDES 8.6 MG
2 TABLET ORAL AT BEDTIME
Refills: 0 | Status: DISCONTINUED | OUTPATIENT
Start: 2025-01-19 | End: 2025-01-23

## 2025-01-18 RX ORDER — FOLIC ACID 1 MG
1 TABLET ORAL DAILY
Refills: 0 | Status: DISCONTINUED | OUTPATIENT
Start: 2025-01-18 | End: 2025-01-20

## 2025-01-18 RX ORDER — HEPARIN SODIUM,PORCINE 10000/ML
2900 VIAL (ML) INJECTION ONCE
Refills: 0 | Status: COMPLETED | OUTPATIENT
Start: 2025-01-18 | End: 2025-01-18

## 2025-01-18 RX ORDER — DM/PSEUDOEPHED/ACETAMINOPHEN 10-30-250
25 CAPSULE ORAL ONCE
Refills: 0 | Status: DISCONTINUED | OUTPATIENT
Start: 2025-01-18 | End: 2025-01-19

## 2025-01-18 RX ORDER — ANTISEPTIC SURGICAL SCRUB 0.04 MG/ML
15 SOLUTION TOPICAL EVERY 12 HOURS
Refills: 0 | Status: DISCONTINUED | OUTPATIENT
Start: 2025-01-18 | End: 2025-01-19

## 2025-01-18 RX ORDER — HEPARIN SODIUM,PORCINE 10000/ML
VIAL (ML) INJECTION
Qty: 25000 | Refills: 0 | Status: DISCONTINUED | OUTPATIENT
Start: 2025-01-18 | End: 2025-01-21

## 2025-01-18 RX ORDER — DM/PSEUDOEPHED/ACETAMINOPHEN 10-30-250
12.5 CAPSULE ORAL ONCE
Refills: 0 | Status: DISCONTINUED | OUTPATIENT
Start: 2025-01-18 | End: 2025-01-19

## 2025-01-18 RX ORDER — ATORVASTATIN CALCIUM 80 MG/1
40 TABLET, FILM COATED ORAL AT BEDTIME
Refills: 0 | Status: DISCONTINUED | OUTPATIENT
Start: 2025-01-18 | End: 2025-01-23

## 2025-01-18 RX ORDER — THIAMINE HCL 100 MG
500 TABLET ORAL EVERY 8 HOURS
Refills: 0 | Status: DISCONTINUED | OUTPATIENT
Start: 2025-01-18 | End: 2025-01-20

## 2025-01-18 RX ORDER — GLUCAGON 3 MG/1
1 POWDER NASAL ONCE
Refills: 0 | Status: DISCONTINUED | OUTPATIENT
Start: 2025-01-18 | End: 2025-01-19

## 2025-01-18 RX ORDER — POTASSIUM CHLORIDE 750 MG/1
40 TABLET, EXTENDED RELEASE ORAL ONCE
Refills: 0 | Status: COMPLETED | OUTPATIENT
Start: 2025-01-18 | End: 2025-01-18

## 2025-01-18 RX ORDER — POLYETHYLENE GLYCOL 3350 17 G/17G
17 POWDER, FOR SOLUTION ORAL
Refills: 0 | Status: DISCONTINUED | OUTPATIENT
Start: 2025-01-18 | End: 2025-01-23

## 2025-01-18 RX ORDER — INSULIN LISPRO 100/ML
VIAL (ML) SUBCUTANEOUS EVERY 6 HOURS
Refills: 0 | Status: DISCONTINUED | OUTPATIENT
Start: 2025-01-18 | End: 2025-01-19

## 2025-01-18 RX ORDER — HEPARIN SODIUM,PORCINE 10000/ML
2900 VIAL (ML) INJECTION EVERY 6 HOURS
Refills: 0 | Status: DISCONTINUED | OUTPATIENT
Start: 2025-01-18 | End: 2025-01-21

## 2025-01-18 RX ORDER — MAGNESIUM HYDROXIDE 400 MG/5ML
30 SUSPENSION, ORAL (FINAL DOSE FORM) ORAL DAILY
Refills: 0 | Status: DISCONTINUED | OUTPATIENT
Start: 2025-01-18 | End: 2025-01-23

## 2025-01-18 RX ORDER — ANTISEPTIC SURGICAL SCRUB 0.04 MG/ML
1 SOLUTION TOPICAL
Refills: 0 | Status: DISCONTINUED | OUTPATIENT
Start: 2025-01-18 | End: 2025-01-23

## 2025-01-18 RX ORDER — PANTOPRAZOLE 20 MG/1
40 TABLET, DELAYED RELEASE ORAL DAILY
Refills: 0 | Status: DISCONTINUED | OUTPATIENT
Start: 2025-01-18 | End: 2025-01-19

## 2025-01-18 RX ADMIN — Medication 60 MILLIGRAM(S): at 21:59

## 2025-01-18 RX ADMIN — Medication 1: at 18:11

## 2025-01-18 RX ADMIN — ASPIRIN 81 MILLIGRAM(S): 81 TABLET, COATED ORAL at 12:55

## 2025-01-18 RX ADMIN — SODIUM CHLORIDE 50 MILLILITER(S): 9 INJECTION, SOLUTION INTRAVENOUS at 05:00

## 2025-01-18 RX ADMIN — Medication 600 UNIT(S)/HR: at 19:08

## 2025-01-18 RX ADMIN — POTASSIUM CHLORIDE 40 MILLIEQUIVALENT(S): 750 TABLET, EXTENDED RELEASE ORAL at 13:57

## 2025-01-18 RX ADMIN — Medication 60 MILLIGRAM(S): at 13:57

## 2025-01-18 RX ADMIN — Medication 2.71 MG/KG/HR: at 00:32

## 2025-01-18 RX ADMIN — ATORVASTATIN CALCIUM 40 MILLIGRAM(S): 80 TABLET, FILM COATED ORAL at 21:59

## 2025-01-18 RX ADMIN — Medication 1 MILLIGRAM(S): at 13:58

## 2025-01-18 RX ADMIN — POTASSIUM PHOSPHATE, MONOBASIC POTASSIUM PHOSPHATE, DIBASIC 62.5 MILLIMOLE(S): 236; 224 INJECTION, SOLUTION INTRAVENOUS at 08:00

## 2025-01-18 RX ADMIN — IPRATROPIUM BROMIDE AND ALBUTEROL SULFATE 3 MILLILITER(S): .5; 2.5 SOLUTION RESPIRATORY (INHALATION) at 09:03

## 2025-01-18 RX ADMIN — IPRATROPIUM BROMIDE AND ALBUTEROL SULFATE 3 MILLILITER(S): .5; 2.5 SOLUTION RESPIRATORY (INHALATION) at 17:07

## 2025-01-18 RX ADMIN — ANTISEPTIC SURGICAL SCRUB 15 MILLILITER(S): 0.04 SOLUTION TOPICAL at 05:39

## 2025-01-18 RX ADMIN — PANTOPRAZOLE 40 MILLIGRAM(S): 20 TABLET, DELAYED RELEASE ORAL at 08:38

## 2025-01-18 RX ADMIN — Medication 30 MILLILITER(S): at 12:52

## 2025-01-18 RX ADMIN — IPRATROPIUM BROMIDE AND ALBUTEROL SULFATE 3 MILLILITER(S): .5; 2.5 SOLUTION RESPIRATORY (INHALATION) at 13:29

## 2025-01-18 RX ADMIN — Medication 105 MILLIGRAM(S): at 21:59

## 2025-01-18 RX ADMIN — SODIUM CHLORIDE 100 MILLILITER(S): 9 INJECTION, SOLUTION INTRAVENOUS at 17:38

## 2025-01-18 RX ADMIN — SODIUM CHLORIDE 1000 MILLILITER(S): 9 INJECTION, SOLUTION INTRAVENOUS at 13:16

## 2025-01-18 RX ADMIN — POLYETHYLENE GLYCOL 3350 17 GRAM(S): 17 POWDER, FOR SOLUTION ORAL at 12:55

## 2025-01-18 RX ADMIN — ANTISEPTIC SURGICAL SCRUB 1 APPLICATION(S): 0.04 SOLUTION TOPICAL at 05:38

## 2025-01-18 RX ADMIN — ANTISEPTIC SURGICAL SCRUB 15 MILLILITER(S): 0.04 SOLUTION TOPICAL at 17:38

## 2025-01-18 RX ADMIN — Medication 700 UNIT(S)/HR: at 22:19

## 2025-01-18 RX ADMIN — PROPOFOL 3.25 MICROGRAM(S)/KG/MIN: 10 INJECTION, EMULSION INTRAVENOUS at 19:11

## 2025-01-18 RX ADMIN — IPRATROPIUM BROMIDE AND ALBUTEROL SULFATE 3 MILLILITER(S): .5; 2.5 SOLUTION RESPIRATORY (INHALATION) at 02:27

## 2025-01-18 RX ADMIN — PROPOFOL 3.25 MICROGRAM(S)/KG/MIN: 10 INJECTION, EMULSION INTRAVENOUS at 05:58

## 2025-01-18 RX ADMIN — Medication 600 UNIT(S)/HR: at 14:24

## 2025-01-18 RX ADMIN — Medication 2 TABLET(S): at 22:08

## 2025-01-18 RX ADMIN — Medication 60 MILLIGRAM(S): at 05:38

## 2025-01-18 RX ADMIN — Medication 2 MILLIGRAM(S): at 00:07

## 2025-01-18 RX ADMIN — POLYETHYLENE GLYCOL 3350 17 GRAM(S): 17 POWDER, FOR SOLUTION ORAL at 21:59

## 2025-01-18 RX ADMIN — Medication 2900 UNIT(S): at 14:23

## 2025-01-18 RX ADMIN — PROPOFOL 3.25 MICROGRAM(S)/KG/MIN: 10 INJECTION, EMULSION INTRAVENOUS at 13:56

## 2025-01-18 RX ADMIN — IPRATROPIUM BROMIDE AND ALBUTEROL SULFATE 3 MILLILITER(S): .5; 2.5 SOLUTION RESPIRATORY (INHALATION) at 20:34

## 2025-01-18 RX ADMIN — Medication 105 MILLIGRAM(S): at 11:03

## 2025-01-18 RX ADMIN — SODIUM CHLORIDE 100 MILLILITER(S): 9 INJECTION, SOLUTION INTRAVENOUS at 11:11
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
## 2025-01-18 NOTE — PROGRESS NOTE ADULT - SUBJECTIVE AND OBJECTIVE BOX
Patient is a 54y old  Male who presents with a chief complaint of exacerbation of asthma (18 Jan 2025 08:45)    Allergies    No Known Allergies    Intolerances      REVIEW OF SYSTEMS: SEE BELOW       ICU Vital Signs Last 24 Hrs  T(C): 36.6 (18 Jan 2025 06:09), Max: 37.5 (17 Jan 2025 23:05)  T(F): 97.8 (18 Jan 2025 06:09), Max: 99.5 (17 Jan 2025 23:05)  HR: 77 (18 Jan 2025 11:00) (77 - 144)  BP: 115/85 (18 Jan 2025 11:00) (90/68 - 173/125)  BP(mean): 96 (18 Jan 2025 11:00) (72 - 137)  ABP: --  ABP(mean): --  RR: 18 (18 Jan 2025 11:00) (15 - 22)  SpO2: 99% (18 Jan 2025 11:00) (93% - 100%)    O2 Parameters below as of 18 Jan 2025 09:11  Patient On (Oxygen Delivery Method): ventilator            CAPILLARY BLOOD GLUCOSE      POCT Blood Glucose.: 158 mg/dL (18 Jan 2025 00:27)      I&O's Summary    17 Jan 2025 07:01  -  18 Jan 2025 07:00  --------------------------------------------------------  IN: 141 mL / OUT: 520 mL / NET: -379 mL        Mode: AC/ CMV (Assist Control/ Continuous Mandatory Ventilation)  RR (machine): 18  TV (machine): 450  FiO2: 30  PEEP: 6  ITime: 1  MAP: 11  PIP: 22      MEDICATIONS  (STANDING):  albuterol/ipratropium for Nebulization 3 milliLiter(s) Nebulizer every 6 hours  chlorhexidine 0.12% Liquid 15 milliLiter(s) Oral Mucosa every 12 hours  chlorhexidine 2% Cloths 1 Application(s) Topical <User Schedule>  folic acid Injectable 1 milliGRAM(s) IV Push daily  influenza   Vaccine 0.5 milliLiter(s) IntraMuscular once  lactated ringers. 1000 milliLiter(s) (100 mL/Hr) IV Continuous <Continuous>  methylPREDNISolone sodium succinate Injectable 60 milliGRAM(s) IV Push every 8 hours  midazolam Infusion. 0.05 mG/kG/Hr (2.71 mL/Hr) IV Continuous <Continuous>  pantoprazole  Injectable 40 milliGRAM(s) IV Push daily  propofol Infusion 10 MICROgram(s)/kG/Min (3.25 mL/Hr) IV Continuous <Continuous>  thiamine IVPB 500 milliGRAM(s) IV Intermittent every 8 hours      MEDICATIONS  (PRN):  acetaminophen     Tablet .. 650 milliGRAM(s) Oral every 6 hours PRN Temp greater or equal to 38C (100.4F), Mild Pain (1 - 3)  albuterol    90 MICROgram(s) HFA Inhaler 2 Puff(s) Inhalation every 4 hours PRN Shortness of Breath and/or Wheezing  aluminum hydroxide/magnesium hydroxide/simethicone Suspension 30 milliLiter(s) Oral every 4 hours PRN Dyspepsia  melatonin 3 milliGRAM(s) Oral at bedtime PRN Insomnia  ondansetron Injectable 4 milliGRAM(s) IV Push every 8 hours PRN Nausea and/or Vomiting      PHYSICAL EXAM: SEE BELOW                          11.1   13.99 )-----------( 249      ( 18 Jan 2025 05:29 )             33.3     Bands 1.0    01-18    140  |  110[H]  |  16  ----------------------------<  161[H]  3.4[L]   |  25  |  0.76    Ca    8.3[L]      18 Jan 2025 05:29  Phos  2.4     01-18  Mg     2.1     01-18    TPro  6.3  /  Alb  3.2[L]  /  TBili  0.5  /  DBili  x   /  AST  428[H]  /  ALT  125[H]  /  AlkPhos  65  01-18    Lactate 2.1           01-18 @ 05:29    Lactate 2.0           01-18 @ 03:08    Lactate 5.3           01-17 @ 23:46          PT/INR - ( 16 Jan 2025 14:37 )   PT: 10.6 sec;   INR: 0.92 ratio         PTT - ( 16 Jan 2025 14:37 )  PTT:38.9 sec  Urinalysis Basic - ( 18 Jan 2025 05:29 )    Color: x / Appearance: x / SG: x / pH: x  Gluc: 161 mg/dL / Ketone: x  / Bili: x / Urobili: x   Blood: x / Protein: x / Nitrite: x   Leuk Esterase: x / RBC: x / WBC x   Sq Epi: x / Non Sq Epi: x / Bacteria: x      .Blood BLOOD   No growth at 24 hours -- 01-16 @ 14:52
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
## 2025-01-18 NOTE — PROVIDER CONTACT NOTE (CHANGE IN STATUS NOTIFICATION) - SITUATION
Notified by tele that patient's HR was in the 150s. RN found patient collapsing into bed with no pulse. Compression started, code blue called at 22:47. ROSC achieved at 22:51 and patient intubated and transferred to CCU.
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
## 2025-01-18 NOTE — CONSULT NOTE ADULT - PROBLEM SELECTOR RECOMMENDATION 9
unclear etiology  , possible cardiac , patient developed tachycardia initially , became pulse less  unresponsive , hypertensive , ischemic ST changes , elevated troponin   ecotrin  statin , no BB as patient was admitted with exacerbation of COPD , echo , serial ekg , troponin ,  would obtain CT angio to rule out  Pulmonary , if negative will need cardiac cath     discussed with Dr ramirez

## 2025-01-19 LAB
A1C WITH ESTIMATED AVERAGE GLUCOSE RESULT: 5.8 % — HIGH (ref 4–5.6)
ALBUMIN SERPL ELPH-MCNC: 3.1 G/DL — LOW (ref 3.3–5)
ALP SERPL-CCNC: 56 U/L — SIGNIFICANT CHANGE UP (ref 40–120)
ALT FLD-CCNC: 93 U/L — HIGH (ref 12–78)
AMMONIA BLD-MCNC: 32 UMOL/L — SIGNIFICANT CHANGE UP (ref 11–32)
ANION GAP SERPL CALC-SCNC: 2 MMOL/L — LOW (ref 5–17)
APTT BLD: 36.2 SEC — HIGH (ref 24.5–35.6)
APTT BLD: 42.4 SEC — HIGH (ref 24.5–35.6)
APTT BLD: 43.1 SEC — HIGH (ref 24.5–35.6)
AST SERPL-CCNC: 141 U/L — HIGH (ref 15–37)
BILIRUB SERPL-MCNC: 0.3 MG/DL — SIGNIFICANT CHANGE UP (ref 0.2–1.2)
BUN SERPL-MCNC: 6 MG/DL — LOW (ref 7–23)
CALCIUM SERPL-MCNC: 8.2 MG/DL — LOW (ref 8.5–10.1)
CHLORIDE SERPL-SCNC: 110 MMOL/L — HIGH (ref 96–108)
CHOLEST SERPL-MCNC: 160 MG/DL — SIGNIFICANT CHANGE UP
CO2 SERPL-SCNC: 29 MMOL/L — SIGNIFICANT CHANGE UP (ref 22–31)
CREAT SERPL-MCNC: 0.75 MG/DL — SIGNIFICANT CHANGE UP (ref 0.5–1.3)
EGFR: 109 ML/MIN/1.73M2 — SIGNIFICANT CHANGE UP
ESTIMATED AVERAGE GLUCOSE: 120 MG/DL — HIGH (ref 68–114)
GLUCOSE SERPL-MCNC: 136 MG/DL — HIGH (ref 70–99)
HCT VFR BLD CALC: 33 % — LOW (ref 39–50)
HDLC SERPL-MCNC: 100 MG/DL — SIGNIFICANT CHANGE UP
HGB BLD-MCNC: 11 G/DL — LOW (ref 13–17)
LACTATE SERPL-SCNC: 2.4 MMOL/L — HIGH (ref 0.7–2)
LIPID PNL WITH DIRECT LDL SERPL: 49 MG/DL — SIGNIFICANT CHANGE UP
MAGNESIUM SERPL-MCNC: 2.4 MG/DL — SIGNIFICANT CHANGE UP (ref 1.6–2.6)
MCHC RBC-ENTMCNC: 29.2 PG — SIGNIFICANT CHANGE UP (ref 27–34)
MCHC RBC-ENTMCNC: 33.3 G/DL — SIGNIFICANT CHANGE UP (ref 32–36)
MCV RBC AUTO: 87.5 FL — SIGNIFICANT CHANGE UP (ref 80–100)
NON HDL CHOLESTEROL: 60 MG/DL — SIGNIFICANT CHANGE UP
PHOSPHATE SERPL-MCNC: 3.3 MG/DL — SIGNIFICANT CHANGE UP (ref 2.5–4.5)
PLATELET # BLD AUTO: 247 K/UL — SIGNIFICANT CHANGE UP (ref 150–400)
POTASSIUM SERPL-MCNC: 3.5 MMOL/L — SIGNIFICANT CHANGE UP (ref 3.5–5.3)
POTASSIUM SERPL-SCNC: 3.5 MMOL/L — SIGNIFICANT CHANGE UP (ref 3.5–5.3)
PROT SERPL-MCNC: 6.2 GM/DL — SIGNIFICANT CHANGE UP (ref 6–8.3)
RBC # BLD: 3.77 M/UL — LOW (ref 4.2–5.8)
RBC # FLD: 18.1 % — HIGH (ref 10.3–14.5)
SODIUM SERPL-SCNC: 141 MMOL/L — SIGNIFICANT CHANGE UP (ref 135–145)
TRIGL SERPL-MCNC: 55 MG/DL — SIGNIFICANT CHANGE UP
TROPONIN I, HIGH SENSITIVITY RESULT: 431.64 NG/L — HIGH
WBC # BLD: 8.48 K/UL — SIGNIFICANT CHANGE UP (ref 3.8–10.5)
WBC # FLD AUTO: 8.48 K/UL — SIGNIFICANT CHANGE UP (ref 3.8–10.5)

## 2025-01-19 PROCEDURE — 99232 SBSQ HOSP IP/OBS MODERATE 35: CPT

## 2025-01-19 PROCEDURE — 93010 ELECTROCARDIOGRAM REPORT: CPT

## 2025-01-19 PROCEDURE — 99233 SBSQ HOSP IP/OBS HIGH 50: CPT

## 2025-01-19 PROCEDURE — 99291 CRITICAL CARE FIRST HOUR: CPT

## 2025-01-19 RX ORDER — POTASSIUM CHLORIDE 750 MG/1
40 TABLET, EXTENDED RELEASE ORAL ONCE
Refills: 0 | Status: COMPLETED | OUTPATIENT
Start: 2025-01-19 | End: 2025-01-19

## 2025-01-19 RX ORDER — METHYLPREDNISOLONE ACETATE 40 MG/ML
40 VIAL (ML) INJECTION ONCE
Refills: 0 | Status: COMPLETED | OUTPATIENT
Start: 2025-01-19 | End: 2025-01-19

## 2025-01-19 RX ORDER — METHYLPREDNISOLONE ACETATE 40 MG/ML
20 VIAL (ML) INJECTION EVERY 12 HOURS
Refills: 0 | Status: COMPLETED | OUTPATIENT
Start: 2025-01-20 | End: 2025-01-21

## 2025-01-19 RX ADMIN — Medication 1 MILLIGRAM(S): at 12:06

## 2025-01-19 RX ADMIN — Medication 950 UNIT(S)/HR: at 21:43

## 2025-01-19 RX ADMIN — PANTOPRAZOLE 40 MILLIGRAM(S): 20 TABLET, DELAYED RELEASE ORAL at 06:05

## 2025-01-19 RX ADMIN — POLYETHYLENE GLYCOL 3350 17 GRAM(S): 17 POWDER, FOR SOLUTION ORAL at 12:06

## 2025-01-19 RX ADMIN — Medication 850 UNIT(S)/HR: at 05:10

## 2025-01-19 RX ADMIN — Medication 105 MILLIGRAM(S): at 05:49

## 2025-01-19 RX ADMIN — Medication 950 UNIT(S)/HR: at 15:05

## 2025-01-19 RX ADMIN — ANTISEPTIC SURGICAL SCRUB 15 MILLILITER(S): 0.04 SOLUTION TOPICAL at 06:05

## 2025-01-19 RX ADMIN — ASPIRIN 81 MILLIGRAM(S): 81 TABLET, COATED ORAL at 12:06

## 2025-01-19 RX ADMIN — SODIUM CHLORIDE 100 MILLILITER(S): 9 INJECTION, SOLUTION INTRAVENOUS at 03:15

## 2025-01-19 RX ADMIN — IPRATROPIUM BROMIDE AND ALBUTEROL SULFATE 3 MILLILITER(S): .5; 2.5 SOLUTION RESPIRATORY (INHALATION) at 14:24

## 2025-01-19 RX ADMIN — POTASSIUM CHLORIDE 40 MILLIEQUIVALENT(S): 750 TABLET, EXTENDED RELEASE ORAL at 12:17

## 2025-01-19 RX ADMIN — IPRATROPIUM BROMIDE AND ALBUTEROL SULFATE 3 MILLILITER(S): .5; 2.5 SOLUTION RESPIRATORY (INHALATION) at 08:24

## 2025-01-19 RX ADMIN — IPRATROPIUM BROMIDE AND ALBUTEROL SULFATE 3 MILLILITER(S): .5; 2.5 SOLUTION RESPIRATORY (INHALATION) at 20:35

## 2025-01-19 RX ADMIN — Medication 60 MILLIGRAM(S): at 06:06

## 2025-01-19 RX ADMIN — PROPOFOL 3.25 MICROGRAM(S)/KG/MIN: 10 INJECTION, EMULSION INTRAVENOUS at 02:22

## 2025-01-19 RX ADMIN — Medication 105 MILLIGRAM(S): at 21:42

## 2025-01-19 RX ADMIN — Medication 850 UNIT(S)/HR: at 07:30

## 2025-01-19 RX ADMIN — Medication 40 MILLIGRAM(S): at 20:34

## 2025-01-19 RX ADMIN — Medication 1050 UNIT(S)/HR: at 22:17

## 2025-01-19 RX ADMIN — Medication 2.71 MG/KG/HR: at 06:54

## 2025-01-19 RX ADMIN — POLYETHYLENE GLYCOL 3350 17 GRAM(S): 17 POWDER, FOR SOLUTION ORAL at 21:42

## 2025-01-19 RX ADMIN — ATORVASTATIN CALCIUM 40 MILLIGRAM(S): 80 TABLET, FILM COATED ORAL at 21:43

## 2025-01-19 RX ADMIN — Medication 30 MILLILITER(S): at 12:08

## 2025-01-19 RX ADMIN — ANTISEPTIC SURGICAL SCRUB 1 APPLICATION(S): 0.04 SOLUTION TOPICAL at 06:06

## 2025-01-19 RX ADMIN — Medication 2 TABLET(S): at 21:43

## 2025-01-19 RX ADMIN — IPRATROPIUM BROMIDE AND ALBUTEROL SULFATE 3 MILLILITER(S): .5; 2.5 SOLUTION RESPIRATORY (INHALATION) at 01:12

## 2025-01-19 NOTE — PROGRESS NOTE ADULT - SUBJECTIVE AND OBJECTIVE BOX
Interval History:  Extubated this AM.  Spoke to patient with an .  He says that he recalls falling after going to the bathroom on [address]. He lost his balance and denies loss of consciousness.  He then remembers feeling short of breath before collapsing on the bed.  He denies any prior history of seizures.  He says that he typically drinks 2-3 "weak" drinks per night but had not had a drink for 3-4 days prior to admission.    MEDICATIONS  (STANDING):  albuterol/ipratropium for Nebulization 3 milliLiter(s) Nebulizer every 6 hours  aspirin  chewable 81 milliGRAM(s) Oral daily  atorvastatin 40 milliGRAM(s) Oral at bedtime  chlorhexidine 2% Cloths 1 Application(s) Topical <User Schedule>  folic acid Injectable 1 milliGRAM(s) IV Push daily  heparin  Infusion.  Unit(s)/Hr (6 mL/Hr) IV Continuous <Continuous>  influenza   Vaccine 0.5 milliLiter(s) IntraMuscular once  lactated ringers. 1000 milliLiter(s) (100 mL/Hr) IV Continuous <Continuous>  magnesium hydroxide Suspension 30 milliLiter(s) Oral daily  methylPREDNISolone sodium succinate Injectable 40 milliGRAM(s) IV Push once  polyethylene glycol 3350 17 Gram(s) Oral two times a day  potassium chloride   Powder 40 milliEquivalent(s) Oral once  senna 2 Tablet(s) Oral at bedtime  thiamine IVPB 500 milliGRAM(s) IV Intermittent every 8 hours    MEDICATIONS  (PRN):  acetaminophen     Tablet .. 650 milliGRAM(s) Oral every 6 hours PRN Temp greater or equal to 38C (100.4F), Mild Pain (1 - 3)  albuterol    90 MICROgram(s) HFA Inhaler 2 Puff(s) Inhalation every 4 hours PRN Shortness of Breath and/or Wheezing  aluminum hydroxide/magnesium hydroxide/simethicone Suspension 30 milliLiter(s) Oral every 4 hours PRN Dyspepsia  heparin   Injectable 2900 Unit(s) IV Push every 6 hours PRN For aPTT less than 40  LORazepam   Injectable 2 milliGRAM(s) IV Push every 3 hours PRN CIWA-Ar score 8 or greater  ondansetron Injectable 4 milliGRAM(s) IV Push every 8 hours PRN Nausea and/or Vomiting      Allergies    No Known Allergies    Intolerances        PHYSICAL EXAM:  Vital Signs Last 24 Hrs  T(F): 98.8 (01-19-25 @ 06:08)  HR: 100 (01-19-25 @ 08:32)  BP: 135/96 (01-19-25 @ 06:00)  RR: 8 (01-19-25 @ 06:00)    GENERAL: NAD, well-groomed, well-developed  HEAD:  Atraumatic, Normocephalic  Neuro:  Awake, alert, no aphasia  CN: PERRL, EOMI, no nystagmus, no facial weakness, tongue protrudes in the midline  motor: normal tone, no pronator drift, full strength in all four extremities  sensory: intact to light touch  coordination: finger to nose intact bilaterally  DTRs: symmetric, plantar responses flexor bilaterally    LABS:                        11.0   8.48  )-----------( 247      ( 19 Jan 2025 04:00 )             33.0     01-19    141  |  110[H]  |  6[L]  ----------------------------<  136[H]  3.5   |  29  |  0.75    Ca    8.2[L]      19 Jan 2025 04:00  Phos  3.3     01-19  Mg     2.4     01-19    TPro  6.2  /  Alb  3.1[L]  /  TBili  0.3  /  DBili  x   /  AST  141[H]  /  ALT  93[H]  /  AlkPhos  56  01-19    PT/INR - ( 18 Jan 2025 12:25 )   PT: 10.6 sec;   INR: 0.92 ratio         PTT - ( 19 Jan 2025 04:00 )  PTT:36.2 sec  Urinalysis Basic - ( 19 Jan 2025 04:00 )    Color: x / Appearance: x / SG: x / pH: x  Gluc: 136 mg/dL / Ketone: x  / Bili: x / Urobili: x   Blood: x / Protein: x / Nitrite: x   Leuk Esterase: x / RBC: x / WBC x   Sq Epi: x / Non Sq Epi: x / Bacteria: x        RADIOLOGY & ADDITIONAL STUDIES:    CT head 1/18/25:  *  No evidence of acute intracranial hemorrhage, midline shift or CT   evidence of acute territorial infarct.  *  Pansinusitis.  *  If the patient's symptoms persist, consider short interval follow-up   head CT or brain MRI if there are no MRI contraindications.    EEG 1/18/25:  Mild generalized slowing  excess beta activity

## 2025-01-19 NOTE — PROGRESS NOTE ADULT - ASSESSMENT
54 year old man with asthma, history of ETOH abuse, traumatic SAH admitted with SOB on exertion and treated for asthma exacerbation.  On 1/17 he was transferred to the CCU after ? loss of pulse, loss of consciousness.  He was extubated on 1/19 (today).  EEG on 1/18 did not show any epileptiform activity.  Unclear if he had a seizure and if so if it was related to alcohol withdrawal (did not have other signs of withdrawal).  Can monitor off anti-seizure medications for now and have a low threshold to start if he has any events concerning for seizures.      discussed with Dr. Coates

## 2025-01-19 NOTE — PROGRESS NOTE ADULT - SUBJECTIVE AND OBJECTIVE BOX
Subjective:    pat s/p extubated-mild stridrous, sitting in bed.    MEDICATIONS  (STANDING):  albuterol/ipratropium for Nebulization 3 milliLiter(s) Nebulizer every 6 hours  aspirin  chewable 81 milliGRAM(s) Oral daily  atorvastatin 40 milliGRAM(s) Oral at bedtime  chlorhexidine 2% Cloths 1 Application(s) Topical <User Schedule>  folic acid Injectable 1 milliGRAM(s) IV Push daily  heparin  Infusion.  Unit(s)/Hr (6 mL/Hr) IV Continuous <Continuous>  influenza   Vaccine 0.5 milliLiter(s) IntraMuscular once  lactated ringers. 1000 milliLiter(s) (100 mL/Hr) IV Continuous <Continuous>  magnesium hydroxide Suspension 30 milliLiter(s) Oral daily  methylPREDNISolone sodium succinate Injectable 40 milliGRAM(s) IV Push once  polyethylene glycol 3350 17 Gram(s) Oral two times a day  senna 2 Tablet(s) Oral at bedtime  thiamine IVPB 500 milliGRAM(s) IV Intermittent every 8 hours    MEDICATIONS  (PRN):  acetaminophen     Tablet .. 650 milliGRAM(s) Oral every 6 hours PRN Temp greater or equal to 38C (100.4F), Mild Pain (1 - 3)  albuterol    90 MICROgram(s) HFA Inhaler 2 Puff(s) Inhalation every 4 hours PRN Shortness of Breath and/or Wheezing  aluminum hydroxide/magnesium hydroxide/simethicone Suspension 30 milliLiter(s) Oral every 4 hours PRN Dyspepsia  heparin   Injectable 2900 Unit(s) IV Push every 6 hours PRN For aPTT less than 40  LORazepam   Injectable 2 milliGRAM(s) IV Push every 3 hours PRN CIWA-Ar score 8 or greater  ondansetron Injectable 4 milliGRAM(s) IV Push every 8 hours PRN Nausea and/or Vomiting      Allergies    No Known Allergies    Intolerances        Vital Signs Last 24 Hrs  T(C): 36.4 (19 Jan 2025 10:54), Max: 37.6 (19 Jan 2025 03:00)  T(F): 97.6 (19 Jan 2025 10:54), Max: 99.7 (19 Jan 2025 03:00)  HR: 93 (19 Jan 2025 13:00) (76 - 112)  BP: 139/98 (19 Jan 2025 13:00) (101/73 - 147/92)  BP(mean): 111 (19 Jan 2025 13:00) (83 - 115)  RR: 20 (19 Jan 2025 13:00) (8 - 21)  SpO2: 95% (19 Jan 2025 13:00) (88% - 100%)    Parameters below as of 19 Jan 2025 08:32  Patient On (Oxygen Delivery Method): ventilator      Mode: PS (Pressure Support)/ Spontaneous  FiO2: 30  PEEP: 6  PS: 10  ITime: 1.5  MAP: 8  PIP: 16      PHYSICAL EXAMINATION:    NECK:  Supple. No lymphadenopathy. Jugular venous pressure not elevated. Carotids equal.   HEART:   The cardiac impulse has a normal quality. Reg., Nl S1 and S2.  There are no murmurs, rubs or gallops noted  CHEST:  Chest rhonchi to auscultation. Normal respiratory effort.  ABDOMEN:  Soft and nontender.   EXTREMITIES:  There is no edema.       LABS:                        11.0   8.48  )-----------( 247      ( 19 Jan 2025 04:00 )             33.0     01-19    141  |  110[H]  |  6[L]  ----------------------------<  136[H]  3.5   |  29  |  0.75    Ca    8.2[L]      19 Jan 2025 04:00  Phos  3.3     01-19  Mg     2.4     01-19    TPro  6.2  /  Alb  3.1[L]  /  TBili  0.3  /  DBili  x   /  AST  141[H]  /  ALT  93[H]  /  AlkPhos  56  01-19    PT/INR - ( 18 Jan 2025 12:25 )   PT: 10.6 sec;   INR: 0.92 ratio         PTT - ( 19 Jan 2025 11:29 )  PTT:43.1 sec  Urinalysis Basic - ( 19 Jan 2025 04:00 )    Color: x / Appearance: x / SG: x / pH: x  Gluc: 136 mg/dL / Ketone: x  / Bili: x / Urobili: x   Blood: x / Protein: x / Nitrite: x   Leuk Esterase: x / RBC: x / WBC x   Sq Epi: x / Non Sq Epi: x / Bacteria: x
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
## 2025-01-19 NOTE — PROGRESS NOTE ADULT - PROBLEM SELECTOR PLAN 1
unclear etiology  , possible cardiac , patient developed tachycardia initially , became pulse less  unresponsive ,  post event on monitor showed sinus tachycardia , brief afib , remain sinus rhythm , was hypertensive , ischemic ST changes , elevated troponin   ecotrin  statin , no BB as patient was admitted with exacerbation of COPD ,  Normal ventricular function , EKG showed hyperacute T with J point ST elevation similar to yesterday ekgs,  trending down troponin    no evidence of PE On CT angio ,      discussed with Dr ramirez.     discussed with patient via

## 2025-01-19 NOTE — PROGRESS NOTE ADULT - ASSESSMENT
53 y/o male came to  due to having SOB for the past week. Patient states that he doesn't feel too bad at rest, but when he exerts himself he becomes very SOB. Patient states that he does not F/U outpatient and/or take meds to help prevent his asthma/COPD exacerbations because he does not have insurance. Patient states that he feels better after the breathing treatments and steroids. pat seen for pulmonary eval. pat last night intubated for PEA, now on vent.    PROBLEMS;    Asthma/COPD Exacerbation  Cardiac arrest (PEA)  Acute encephalopathy   Acute resp failure   Likely alcohol withdrawal seizure   ACS/NSTEMI     PLAN;    S/p extubation  IV heparin   IV solu-medrols 40mg qdaily  DuoNebs  Monitor off abx. NO SEPSIS   Sliding scale insulin as on steroid   DVT ppx with iv heparin

## 2025-01-19 NOTE — PROGRESS NOTE ADULT - SUBJECTIVE AND OBJECTIVE BOX
Patient is a 54y old  Male who presents with a chief complaint of asthma (18 Jan 2025 12:51)    Allergies    No Known Allergies    Intolerances      REVIEW OF SYSTEMS: SEE BELOW       ICU Vital Signs Last 24 Hrs  T(C): 37.1 (19 Jan 2025 06:08), Max: 37.6 (19 Jan 2025 03:00)  T(F): 98.8 (19 Jan 2025 06:08), Max: 99.7 (19 Jan 2025 03:00)  HR: 100 (19 Jan 2025 08:32) (72 - 112)  BP: 135/96 (19 Jan 2025 06:00) (101/73 - 135/96)  BP(mean): 109 (19 Jan 2025 06:00) (83 - 109)  ABP: --  ABP(mean): --  RR: 8 (19 Jan 2025 06:00) (8 - 19)  SpO2: 100% (19 Jan 2025 08:32) (88% - 100%)    O2 Parameters below as of 19 Jan 2025 08:32  Patient On (Oxygen Delivery Method): ventilator            CAPILLARY BLOOD GLUCOSE      POCT Blood Glucose.: 144 mg/dL (18 Jan 2025 23:42)  POCT Blood Glucose.: 165 mg/dL (18 Jan 2025 18:05)      I&O's Summary    18 Jan 2025 07:01  -  19 Jan 2025 07:00  --------------------------------------------------------  IN: 3843 mL / OUT: 4045 mL / NET: -202 mL        Mode: PS (Pressure Support)/ Spontaneous  FiO2: 30  PEEP: 6  PS: 10  ITime: 1.5  MAP: 8  PIP: 16      MEDICATIONS  (STANDING):  albuterol/ipratropium for Nebulization 3 milliLiter(s) Nebulizer every 6 hours  aspirin  chewable 81 milliGRAM(s) Oral daily  atorvastatin 40 milliGRAM(s) Oral at bedtime  chlorhexidine 0.12% Liquid 15 milliLiter(s) Oral Mucosa every 12 hours  chlorhexidine 2% Cloths 1 Application(s) Topical <User Schedule>  dextrose 5%. 1000 milliLiter(s) (50 mL/Hr) IV Continuous <Continuous>  dextrose 5%. 1000 milliLiter(s) (100 mL/Hr) IV Continuous <Continuous>  dextrose 50% Injectable 25 Gram(s) IV Push once  dextrose 50% Injectable 12.5 Gram(s) IV Push once  dextrose 50% Injectable 25 Gram(s) IV Push once  folic acid Injectable 1 milliGRAM(s) IV Push daily  glucagon  Injectable 1 milliGRAM(s) IntraMuscular once  heparin  Infusion.  Unit(s)/Hr (6 mL/Hr) IV Continuous <Continuous>  influenza   Vaccine 0.5 milliLiter(s) IntraMuscular once  insulin lispro (ADMELOG) corrective regimen sliding scale   SubCutaneous every 6 hours  lactated ringers. 1000 milliLiter(s) (100 mL/Hr) IV Continuous <Continuous>  magnesium hydroxide Suspension 30 milliLiter(s) Oral daily  methylPREDNISolone sodium succinate Injectable 60 milliGRAM(s) IV Push every 8 hours  midazolam Infusion. 0.05 mG/kG/Hr (2.71 mL/Hr) IV Continuous <Continuous>  pantoprazole  Injectable 40 milliGRAM(s) IV Push daily  polyethylene glycol 3350 17 Gram(s) Oral two times a day  potassium chloride   Powder 40 milliEquivalent(s) Oral once  propofol Infusion 10 MICROgram(s)/kG/Min (3.25 mL/Hr) IV Continuous <Continuous>  senna 2 Tablet(s) Oral at bedtime  thiamine IVPB 500 milliGRAM(s) IV Intermittent every 8 hours      MEDICATIONS  (PRN):  acetaminophen     Tablet .. 650 milliGRAM(s) Oral every 6 hours PRN Temp greater or equal to 38C (100.4F), Mild Pain (1 - 3)  albuterol    90 MICROgram(s) HFA Inhaler 2 Puff(s) Inhalation every 4 hours PRN Shortness of Breath and/or Wheezing  aluminum hydroxide/magnesium hydroxide/simethicone Suspension 30 milliLiter(s) Oral every 4 hours PRN Dyspepsia  dextrose Oral Gel 15 Gram(s) Oral once PRN Blood Glucose LESS THAN 70 milliGRAM(s)/deciliter  heparin   Injectable 2900 Unit(s) IV Push every 6 hours PRN For aPTT less than 40  ondansetron Injectable 4 milliGRAM(s) IV Push every 8 hours PRN Nausea and/or Vomiting      PHYSICAL EXAM: SEE BELOW                          11.0   8.48  )-----------( 247      ( 19 Jan 2025 04:00 )             33.0       01-19    141  |  110[H]  |  6[L]  ----------------------------<  136[H]  3.5   |  29  |  0.75    Ca    8.2[L]      19 Jan 2025 04:00  Phos  3.3     01-19  Mg     2.4     01-19    TPro  6.2  /  Alb  3.1[L]  /  TBili  0.3  /  DBili  x   /  AST  141[H]  /  ALT  93[H]  /  AlkPhos  56  01-19    Lactate 2.4           01-19 @ 04:00    Lactate 2.5           01-18 @ 12:25          PT/INR - ( 18 Jan 2025 12:25 )   PT: 10.6 sec;   INR: 0.92 ratio         PTT - ( 19 Jan 2025 04:00 )  PTT:36.2 sec  Urinalysis Basic - ( 19 Jan 2025 04:00 )    Color: x / Appearance: x / SG: x / pH: x  Gluc: 136 mg/dL / Ketone: x  / Bili: x / Urobili: x   Blood: x / Protein: x / Nitrite: x   Leuk Esterase: x / RBC: x / WBC x   Sq Epi: x / Non Sq Epi: x / Bacteria: x      .Blood BLOOD   No growth at 48 Hours -- 01-16 @ 14:52

## 2025-01-19 NOTE — PROGRESS NOTE ADULT - MENTAL STATUS
woke up on lower sedation, follows simple commands
sedated/unresponsive, intermittent jerky movements on all 4 extremities, no localization
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
## 2025-01-20 LAB
ALBUMIN SERPL ELPH-MCNC: 3.2 G/DL — LOW (ref 3.3–5)
ALP SERPL-CCNC: 60 U/L — SIGNIFICANT CHANGE UP (ref 40–120)
ALT FLD-CCNC: 79 U/L — HIGH (ref 12–78)
ANION GAP SERPL CALC-SCNC: 4 MMOL/L — LOW (ref 5–17)
APTT BLD: 59.1 SEC — HIGH (ref 24.5–35.6)
APTT BLD: 70.7 SEC — HIGH (ref 24.5–35.6)
AST SERPL-CCNC: 66 U/L — HIGH (ref 15–37)
BILIRUB SERPL-MCNC: 0.5 MG/DL — SIGNIFICANT CHANGE UP (ref 0.2–1.2)
BUN SERPL-MCNC: 11 MG/DL — SIGNIFICANT CHANGE UP (ref 7–23)
CALCIUM SERPL-MCNC: 8.3 MG/DL — LOW (ref 8.5–10.1)
CHLORIDE SERPL-SCNC: 108 MMOL/L — SIGNIFICANT CHANGE UP (ref 96–108)
CO2 SERPL-SCNC: 27 MMOL/L — SIGNIFICANT CHANGE UP (ref 22–31)
CREAT SERPL-MCNC: 0.6 MG/DL — SIGNIFICANT CHANGE UP (ref 0.5–1.3)
EGFR: 116 ML/MIN/1.73M2 — SIGNIFICANT CHANGE UP
GLUCOSE SERPL-MCNC: 119 MG/DL — HIGH (ref 70–99)
HCT VFR BLD CALC: 35.3 % — LOW (ref 39–50)
HGB BLD-MCNC: 11.5 G/DL — LOW (ref 13–17)
LACTATE SERPL-SCNC: 1.2 MMOL/L — SIGNIFICANT CHANGE UP (ref 0.7–2)
MAGNESIUM SERPL-MCNC: 2.7 MG/DL — HIGH (ref 1.6–2.6)
MCHC RBC-ENTMCNC: 28.8 PG — SIGNIFICANT CHANGE UP (ref 27–34)
MCHC RBC-ENTMCNC: 32.6 G/DL — SIGNIFICANT CHANGE UP (ref 32–36)
MCV RBC AUTO: 88.3 FL — SIGNIFICANT CHANGE UP (ref 80–100)
PHOSPHATE SERPL-MCNC: 4.2 MG/DL — SIGNIFICANT CHANGE UP (ref 2.5–4.5)
PLATELET # BLD AUTO: 231 K/UL — SIGNIFICANT CHANGE UP (ref 150–400)
POTASSIUM SERPL-MCNC: 3.8 MMOL/L — SIGNIFICANT CHANGE UP (ref 3.5–5.3)
POTASSIUM SERPL-SCNC: 3.8 MMOL/L — SIGNIFICANT CHANGE UP (ref 3.5–5.3)
PROT SERPL-MCNC: 6.5 GM/DL — SIGNIFICANT CHANGE UP (ref 6–8.3)
RBC # BLD: 4 M/UL — LOW (ref 4.2–5.8)
RBC # FLD: 18.2 % — HIGH (ref 10.3–14.5)
SODIUM SERPL-SCNC: 139 MMOL/L — SIGNIFICANT CHANGE UP (ref 135–145)
TROPONIN I, HIGH SENSITIVITY RESULT: 126.98 NG/L — HIGH
WBC # BLD: 8.26 K/UL — SIGNIFICANT CHANGE UP (ref 3.8–10.5)
WBC # FLD AUTO: 8.26 K/UL — SIGNIFICANT CHANGE UP (ref 3.8–10.5)

## 2025-01-20 PROCEDURE — 99233 SBSQ HOSP IP/OBS HIGH 50: CPT

## 2025-01-20 PROCEDURE — 93010 ELECTROCARDIOGRAM REPORT: CPT

## 2025-01-20 PROCEDURE — 99232 SBSQ HOSP IP/OBS MODERATE 35: CPT

## 2025-01-20 RX ADMIN — Medication 1050 UNIT(S)/HR: at 19:21

## 2025-01-20 RX ADMIN — Medication 1050 UNIT(S)/HR: at 17:16

## 2025-01-20 RX ADMIN — IPRATROPIUM BROMIDE AND ALBUTEROL SULFATE 3 MILLILITER(S): .5; 2.5 SOLUTION RESPIRATORY (INHALATION) at 14:07

## 2025-01-20 RX ADMIN — Medication 1050 UNIT(S)/HR: at 06:16

## 2025-01-20 RX ADMIN — IPRATROPIUM BROMIDE AND ALBUTEROL SULFATE 3 MILLILITER(S): .5; 2.5 SOLUTION RESPIRATORY (INHALATION) at 01:10

## 2025-01-20 RX ADMIN — ATORVASTATIN CALCIUM 40 MILLIGRAM(S): 80 TABLET, FILM COATED ORAL at 21:50

## 2025-01-20 RX ADMIN — POLYETHYLENE GLYCOL 3350 17 GRAM(S): 17 POWDER, FOR SOLUTION ORAL at 10:29

## 2025-01-20 RX ADMIN — Medication 105 MILLIGRAM(S): at 06:05

## 2025-01-20 RX ADMIN — Medication 20 MILLIGRAM(S): at 17:35

## 2025-01-20 RX ADMIN — IPRATROPIUM BROMIDE AND ALBUTEROL SULFATE 3 MILLILITER(S): .5; 2.5 SOLUTION RESPIRATORY (INHALATION) at 19:47

## 2025-01-20 RX ADMIN — Medication 2 TABLET(S): at 21:50

## 2025-01-20 RX ADMIN — Medication 20 MILLIGRAM(S): at 05:51

## 2025-01-20 RX ADMIN — Medication 1050 UNIT(S)/HR: at 12:41

## 2025-01-20 RX ADMIN — POLYETHYLENE GLYCOL 3350 17 GRAM(S): 17 POWDER, FOR SOLUTION ORAL at 21:50

## 2025-01-20 RX ADMIN — ASPIRIN 81 MILLIGRAM(S): 81 TABLET, COATED ORAL at 10:29

## 2025-01-20 RX ADMIN — IPRATROPIUM BROMIDE AND ALBUTEROL SULFATE 3 MILLILITER(S): .5; 2.5 SOLUTION RESPIRATORY (INHALATION) at 08:51

## 2025-01-20 RX ADMIN — Medication 1 MILLIGRAM(S): at 10:29

## 2025-01-20 RX ADMIN — Medication 30 MILLILITER(S): at 10:29

## 2025-01-20 NOTE — PROGRESS NOTE ADULT - ASSESSMENT
54 year old man with asthma, history of ETOH abuse, traumatic SAH admitted with SOB on exertion and treated for asthma exacerbation.  On 1/17 he was transferred to the CCU after ? loss of pulse, loss of consciousness.  He was extubated on 1/19.  EEG on 1/18 did not show any epileptiform activity.  Unclear if he had a seizure and if so if it was related to alcohol withdrawal (did not have other signs of withdrawal).  Can monitor off anti-seizure medications for now and have a low threshold to start if he has any events concerning for seizures.  Plan is for cardiac cath on 1/21.    Will f/u if additional input is needed from neurology service.

## 2025-01-20 NOTE — PROGRESS NOTE ADULT - NEUROLOGICAL
sensation intact/responds to pain/responds to verbal commands/no spontaneous movement
sensation intact/responds to pain/responds to verbal commands

## 2025-01-20 NOTE — PROGRESS NOTE ADULT - ASSESSMENT
53 y/o male came to  due to having SOB for the past week. Patient states that he doesn't feel too bad at rest, but when he exerts himself he becomes very SOB. Patient states that he does not F/U outpatient and/or take meds to help prevent his asthma/COPD exacerbations because he does not have insurance. Patient states that he feels better after the breathing treatments and steroids. pat seen for pulmonary eval. pat last night intubated for PEA, now on vent.    PROBLEMS;    Asthma/COPD Exacerbation  Cardiac arrest (PEA)  Acute encephalopathy   Acute resp failure   Likely alcohol withdrawal seizure   ACS/NSTEMI     PLAN;    pulmonary better.  S/p extubation  IV heparin   IV solu-medrols 40mg q12hr  DuoNebs  Monitor off abx. NO SEPSIS   Sliding scale insulin as on steroid   DVT ppx with iv heparin

## 2025-01-20 NOTE — PROGRESS NOTE ADULT - SUBJECTIVE AND OBJECTIVE BOX
Patient is a 54y old  Male who presents with a chief complaint of asthma (18 Jan 2025 12:51)      Medical progress: Denies any HA, CP, SOB. Comfortable  Complaints: no new complaints  State of mind: @ Baseline  Estimated Day of Discharge: anticipated cardiac cath on monday     REVIEW OF SYSTEMS:  General: NAD, hemodynamically stable, (-)  fever, (-) chills, (-) weakness  HEENT:  Eyes:  No visual loss, blurred vision, double vision or yellow sclerae. Ears, Nose, Throat:  No hearing loss, sneezing, congestion, runny nose or sore throat.  SKIN:  No rash or itching.  CARDIOVASCULAR:  No chest pain, chest pressure or chest discomfort. No palpitations or edema.  RESPIRATORY:  No shortness of breath, cough or sputum.  GASTROINTESTINAL:  No anorexia, nausea, vomiting or diarrhea. No abdominal pain or blood.  NEUROLOGICAL:  No headache, dizziness, syncope, paralysis, ataxia, numbness or tingling in the extremities. No change in bowel or bladder control.  MUSCULOSKELETAL:  No muscle, back pain, joint pain or stiffness.  HEMATOLOGIC:  No anemia, bleeding or bruising.  LYMPHATICS:  No enlarged nodes. No history of splenectomy.  ENDOCRINOLOGIC:  No reports of sweating, cold or heat intolerance. No polyuria or polydipsia.  ALLERGIES:  No history of asthma, hives, eczema or rhinitis.    Physical Exam:   GENERAL APPEARANCE:  NAD, hemodynamically stable  T(C): 36.6 (01-20-25 @ 08:52), Max: 37.3 (01-19-25 @ 19:17)  HR: 79 (01-20-25 @ 09:01) (55 - 97)  BP: 146/111 (01-20-25 @ 07:00) (113/75 - 149/97)  RR: 22 (01-20-25 @ 07:00) (12 - 24)  SpO2: 97% (01-20-25 @ 09:01) (95% - 98%)  Wt(kg): --  HEENT:  Head is normocephalic    Skin:  Warm and dry without any rash   NECK:  Supple without lymphadenopathy.   HEART:  Regular rate and rhythm. normal S1 and S2, No M/R/G  LUNGS:  Good ins/exp effort, no W/R/R/C  ABDOMEN:  Soft, nontender, nondistended with good bowel sounds heard  EXTREMITIES:  Without cyanosis, clubbing or edema.   NEUROLOGICAL:  Gross nonfocal     Labs:     CBC Full  -  ( 20 Jan 2025 04:13 )  WBC Count : 8.26 K/uL  RBC Count : 4.00 M/uL  Hemoglobin : 11.5 g/dL  Hematocrit : 35.3 %  Platelet Count - Automated : 231 K/uL  Mean Cell Volume : 88.3 fl  Mean Cell Hemoglobin : 28.8 pg  Mean Cell Hemoglobin Concentration : 32.6 g/dL  Auto Neutrophil # : x  Auto Lymphocyte # : x  Auto Monocyte # : x  Auto Eosinophil # : x  Auto Basophil # : x  Auto Neutrophil % : x  Auto Lymphocyte % : x  Auto Monocyte % : x  Auto Eosinophil % : x  Auto Basophil % : x    PT/INR - ( 18 Jan 2025 12:25 )   PT: 10.6 sec;   INR: 0.92 ratio         PTT - ( 20 Jan 2025 04:13 )  PTT:59.1 sec  Urinalysis Basic - ( 20 Jan 2025 04:13 )    Color: x / Appearance: x / SG: x / pH: x  Gluc: 119 mg/dL / Ketone: x  / Bili: x / Urobili: x   Blood: x / Protein: x / Nitrite: x   Leuk Esterase: x / RBC: x / WBC x   Sq Epi: x / Non Sq Epi: x / Bacteria: x      01-20    139  |  108  |  11  ----------------------------<  119[H]  3.8   |  27  |  0.60    Ca    8.3[L]      20 Jan 2025 04:13  Phos  4.2     01-20  Mg     2.7     01-20    TPro  6.5  /  Alb  3.2[L]  /  TBili  0.5  /  DBili  x   /  AST  66[H]  /  ALT  79[H]  /  AlkPhos  60  01-20        55 y/o male came to  due to having SOB for the past week. Patient states that he doesn't feel too bad at rest, but when he exerts himself he becomes very SOB. Patient states that he does not F/U outpatient and/or take meds to help prevent his asthma/COPD exacerbations because he does not have insurance. Patient states that he feels better after the breathing treatments and steroids. patient was feeling fine until yesterday evening ,( from ICU nurse/critical care attending ) patient was walking back to bed from rest room , was tachycardic at that time , patient apparently grabbed his chest , collapsed on to the bed ,patient did have seizure like activity , patient was intubated , was very hypertensive at that time , ekg showed  ST depression at that time , patient blood work showed elevated troponin , repeat ekg showed resolved ST. Patient required intubation, extubated 1/19.     # Acute hypoxic respiratory failure in the setting of Asthma excercabation  #  Cardiac arrest (PEA)  - required intubation, extubated on 1/19   - continue with IV steroids  - CTA: No evidence of pulmonary embolism.  - pulmonary Dr. Hurt    # Cardiac arrest (PEA)  # Acute encephalopathy  # NSTEMI  - continue with heparin drip  - EEG without epileptiform activity   - ECHO: Left ventricular systolic function is normal with an ejection fraction estimated at 55 to 60 %   - continue high dose thiamine and FA, will start CIWA protocol post-extubation   - [facility] following    # ETOH us  - CIWA protocol

## 2025-01-20 NOTE — PROGRESS NOTE ADULT - SUBJECTIVE AND OBJECTIVE BOX
Interval History:  1/20/25: No neurological complaints    MEDICATIONS  (STANDING):  albuterol/ipratropium for Nebulization 3 milliLiter(s) Nebulizer every 6 hours  aspirin  chewable 81 milliGRAM(s) Oral daily  atorvastatin 40 milliGRAM(s) Oral at bedtime  chlorhexidine 2% Cloths 1 Application(s) Topical <User Schedule>  folic acid Injectable 1 milliGRAM(s) IV Push daily  heparin  Infusion.  Unit(s)/Hr (6 mL/Hr) IV Continuous <Continuous>  influenza   Vaccine 0.5 milliLiter(s) IntraMuscular once  magnesium hydroxide Suspension 30 milliLiter(s) Oral daily  methylPREDNISolone sodium succinate Injectable 20 milliGRAM(s) IV Push every 12 hours  polyethylene glycol 3350 17 Gram(s) Oral two times a day  senna 2 Tablet(s) Oral at bedtime  thiamine IVPB 500 milliGRAM(s) IV Intermittent every 8 hours    MEDICATIONS  (PRN):  acetaminophen     Tablet .. 650 milliGRAM(s) Oral every 6 hours PRN Temp greater or equal to 38C (100.4F), Mild Pain (1 - 3)  albuterol    90 MICROgram(s) HFA Inhaler 2 Puff(s) Inhalation every 4 hours PRN Shortness of Breath and/or Wheezing  aluminum hydroxide/magnesium hydroxide/simethicone Suspension 30 milliLiter(s) Oral every 4 hours PRN Dyspepsia  heparin   Injectable 2900 Unit(s) IV Push every 6 hours PRN For aPTT less than 40  LORazepam   Injectable 2 milliGRAM(s) IV Push every 3 hours PRN CIWA-Ar score 8 or greater  ondansetron Injectable 4 milliGRAM(s) IV Push every 8 hours PRN Nausea and/or Vomiting      Allergies    No Known Allergies    Intolerances        PHYSICAL EXAM:  Vital Signs Last 24 Hrs  T(F): 97.9 (01-20-25 @ 08:52)  HR: 79 (01-20-25 @ 09:01)  BP: 146/111 (01-20-25 @ 07:00)  RR: 22 (01-20-25 @ 07:00)    GENERAL: NAD, well-groomed, well-developed  HEAD:  Atraumatic, Normocephalic  Neuro:  Awake, alert, no aphasia  CN: PERRL, EOMI, no nystagmus, no facial weakness, tongue protrudes in the midline  motor: normal tone, no pronator drift, full strength in all four extremites  sensory: intact to light touch  coordination: finger to nose intact bilaterally      LABS:                        11.5   8.26  )-----------( 231      ( 20 Jan 2025 04:13 )             35.3     01-20    139  |  108  |  11  ----------------------------<  119[H]  3.8   |  27  |  0.60    Ca    8.3[L]      20 Jan 2025 04:13  Phos  4.2     01-20  Mg     2.7     01-20    TPro  6.5  /  Alb  3.2[L]  /  TBili  0.5  /  DBili  x   /  AST  66[H]  /  ALT  79[H]  /  AlkPhos  60  01-20    PT/INR - ( 18 Jan 2025 12:25 )   PT: 10.6 sec;   INR: 0.92 ratio         PTT - ( 20 Jan 2025 04:13 )  PTT:59.1 sec  Urinalysis Basic - ( 20 Jan 2025 04:13 )    Color: x / Appearance: x / SG: x / pH: x  Gluc: 119 mg/dL / Ketone: x  / Bili: x / Urobili: x   Blood: x / Protein: x / Nitrite: x   Leuk Esterase: x / RBC: x / WBC x   Sq Epi: x / Non Sq Epi: x / Bacteria: x        RADIOLOGY & ADDITIONAL STUDIES:    CT head 1/18/25:  *  No evidence of acute intracranial hemorrhage, midline shift or CT   evidence of acute territorial infarct.  *  Pansinusitis.  *  If the patient's symptoms persist, consider short interval follow-up   head CT or brain MRI if there are no MRI contraindications.    EEG 1/18/25:  Mild generalized slowing  excess beta activity

## 2025-01-20 NOTE — PROGRESS NOTE ADULT - PROBLEM SELECTOR PLAN 1
-unclear etiology  , possible cardiac , patient developed tachycardia initially , became pulse less  unresponsive ,  post event on monitor showed sinus tachycardia , brief afib , remain sinus rhythm , was hypertensive ,   -ischemic ST changes , elevated troponin peak 700s.    -ecotrin  statin , no BB as patient was admitted with exacerbation of COPD ,    -Normal ventricular function ,   -EKG showed hyperacute T with J point ST elevation similar to yesterday ekgs,  trending down troponin    no evidence of PE On CT angio ,    -Plan for cardiac cath Tues.

## 2025-01-20 NOTE — PROGRESS NOTE ADULT - SUBJECTIVE AND OBJECTIVE BOX
Subjective:    pat better, sitting in bed, some wheez, on iv heparin for NSEMI    MEDICATIONS  (STANDING):  albuterol/ipratropium for Nebulization 3 milliLiter(s) Nebulizer every 6 hours  aspirin  chewable 81 milliGRAM(s) Oral daily  atorvastatin 40 milliGRAM(s) Oral at bedtime  chlorhexidine 2% Cloths 1 Application(s) Topical <User Schedule>  heparin  Infusion.  Unit(s)/Hr (6 mL/Hr) IV Continuous <Continuous>  influenza   Vaccine 0.5 milliLiter(s) IntraMuscular once  magnesium hydroxide Suspension 30 milliLiter(s) Oral daily  methylPREDNISolone sodium succinate Injectable 20 milliGRAM(s) IV Push every 12 hours  polyethylene glycol 3350 17 Gram(s) Oral two times a day  senna 2 Tablet(s) Oral at bedtime    MEDICATIONS  (PRN):  acetaminophen     Tablet .. 650 milliGRAM(s) Oral every 6 hours PRN Temp greater or equal to 38C (100.4F), Mild Pain (1 - 3)  albuterol    90 MICROgram(s) HFA Inhaler 2 Puff(s) Inhalation every 4 hours PRN Shortness of Breath and/or Wheezing  aluminum hydroxide/magnesium hydroxide/simethicone Suspension 30 milliLiter(s) Oral every 4 hours PRN Dyspepsia  heparin   Injectable 2900 Unit(s) IV Push every 6 hours PRN For aPTT less than 40  ondansetron Injectable 4 milliGRAM(s) IV Push every 8 hours PRN Nausea and/or Vomiting      Allergies    No Known Allergies    Intolerances        Vital Signs Last 24 Hrs  T(C): 36.8 (20 Jan 2025 16:17), Max: 37.3 (19 Jan 2025 19:17)  T(F): 98.3 (20 Jan 2025 16:17), Max: 99.2 (19 Jan 2025 19:17)  HR: 75 (20 Jan 2025 17:00) (55 - 97)  BP: 123/94 (20 Jan 2025 17:00) (113/75 - 149/97)  BP(mean): 105 (20 Jan 2025 17:00) (87 - 123)  RR: 16 (20 Jan 2025 17:00) (12 - 24)  SpO2: 97% (20 Jan 2025 17:00) (95% - 98%)    Parameters below as of 20 Jan 2025 17:00  Patient On (Oxygen Delivery Method): room air          PHYSICAL EXAMINATION:    NECK:  Supple. No lymphadenopathy. Jugular venous pressure not elevated. Carotids equal.   HEART:   The cardiac impulse has a normal quality. Reg., Nl S1 and S2.  There are no murmurs, rubs or gallops noted  CHEST:  Chest rhonchi to auscultation. Normal respiratory effort.  ABDOMEN:  Soft and nontender.   EXTREMITIES:  There is no edema.       LABS:                        11.5   8.26  )-----------( 231      ( 20 Jan 2025 04:13 )             35.3     01-20    139  |  108  |  11  ----------------------------<  119[H]  3.8   |  27  |  0.60    Ca    8.3[L]      20 Jan 2025 04:13  Phos  4.2     01-20  Mg     2.7     01-20    TPro  6.5  /  Alb  3.2[L]  /  TBili  0.5  /  DBili  x   /  AST  66[H]  /  ALT  79[H]  /  AlkPhos  60  01-20    PTT - ( 20 Jan 2025 12:14 )  PTT:70.7 sec  Urinalysis Basic - ( 20 Jan 2025 04:13 )    Color: x / Appearance: x / SG: x / pH: x  Gluc: 119 mg/dL / Ketone: x  / Bili: x / Urobili: x   Blood: x / Protein: x / Nitrite: x   Leuk Esterase: x / RBC: x / WBC x   Sq Epi: x / Non Sq Epi: x / Bacteria: x

## 2025-01-20 NOTE — PROGRESS NOTE ADULT - ASSESSMENT
53 y/o male PMH asthma, traumatic SAH in 2019, ETOH abuse, recent hospitalization and ED visits for SOB, notes suggest regular ETOH use, presented for SOB    Admitted for asthma exacerbation     On 1/17, he had an episode where he clenched his chest followed by unresponsive, seizure like activity, found to be pulseless (ST/SVT on monitor), started CPR, ROSC obtained after 1 round of Epi and approx 3 mins CPR     Cause of cardiac arrest not clear, could be primary cardiac v neuro v resp     Subsequent CT head neg for injury  CT chest/abd/pelvis without critical findings (prelim)     EKG with ischemic changes     Intubated and on sedation to control seizure like activity       Problem list:     Cardiac arrest (PEA)  Acute encephalopathy   Acute resp failure   Likely alcohol withdrawal seizure   ACS/NSTEMI       Plan:     -neuro exam improved, now following commands, moves all extremities, EEG without epileptiform activity   -continue high dose thiamine and FA, will start CIWA protocol post-extubation   -continue aspirin, iv heparin, statin. HbA1C 5.7, check lipid profile, TTE with normal EF, RV fn. Repeat EKG with improved ischemic changes, overnight changes likely in the setting of tachyarrythmia  -BP and HR stable at this time, continue telemetry   -on vent support, tolerating PSV, awake, normal hemodynamics, will extubate. Taper steroid, continue DuoNebs   -BCx neg, WBC normal, no fever, no evidence of infection on CTs, will monitor off abx. NO SEPSIS   -NPO, will consider starting po when stable after extubation   -monitor renal fn, trend and replete lytes prn, d/c Ramos later   -glu normal, HbA1C 5.7, tapering steroid, will d/c sliding scale   -DVT ppx with iv heparin       Patient critically ill       Sepsis criteria met? - NO

## 2025-01-20 NOTE — PROGRESS NOTE ADULT - SUBJECTIVE AND OBJECTIVE BOX
CHIEF COMPLAINT: shortness' of breath     HPI:  55 y/o male came to  due to having SOB for the past week. Patient states that he doesn't feel too bad at rest, but when he exerts himself he becomes very SOB. Patient states that he does not F/U outpatient and/or take meds to help prevent his asthma/COPD exacerbations because he does not have insurance. Patient states that he feels better after the breathing treatments and steroids. patient was feeling fine until yesterday evening ,( from ICU nurse/critical care attending ) patient was walking back to bed from rest room , was tachycardic at that time , patient apparently grabbed his chest , collapsed on to the bed ,patient did have seizure like activity , patient was intubated , was very hypertensive at that time , ekg showed  ST depression at that time , patient blood work showed elevated troponin , repeat ekg showed resolved ST   currently intubated sedated ,     No prior cardiac hx on chart review ,    the sequence of events not clear      1/19/25 interpretor    #[id number],Patient extubated feeling , little sore throat from intubation , patient says he felt dizziness before he fell on to bed , can not give more details  denies any chest pain , does have mild sob ,  1/20/'25: no complaints    INPATIENT  MEDICATIONS  (STANDING):  albuterol/ipratropium for Nebulization 3 milliLiter(s) Nebulizer every 6 hours  aspirin  chewable 81 milliGRAM(s) Oral daily  atorvastatin 40 milliGRAM(s) Oral at bedtime  chlorhexidine 2% Cloths 1 Application(s) Topical <User Schedule>  folic acid Injectable 1 milliGRAM(s) IV Push daily  heparin  Infusion.  Unit(s)/Hr (6 mL/Hr) IV Continuous <Continuous>  influenza   Vaccine 0.5 milliLiter(s) IntraMuscular once  magnesium hydroxide Suspension 30 milliLiter(s) Oral daily  methylPREDNISolone sodium succinate Injectable 20 milliGRAM(s) IV Push every 12 hours  polyethylene glycol 3350 17 Gram(s) Oral two times a day  senna 2 Tablet(s) Oral at bedtime  thiamine IVPB 500 milliGRAM(s) IV Intermittent every 8 hours    MEDICATIONS  (PRN):  acetaminophen     Tablet .. 650 milliGRAM(s) Oral every 6 hours PRN Temp greater or equal to 38C (100.4F), Mild Pain (1 - 3)  albuterol    90 MICROgram(s) HFA Inhaler 2 Puff(s) Inhalation every 4 hours PRN Shortness of Breath and/or Wheezing  aluminum hydroxide/magnesium hydroxide/simethicone Suspension 30 milliLiter(s) Oral every 4 hours PRN Dyspepsia  heparin   Injectable 2900 Unit(s) IV Push every 6 hours PRN For aPTT less than 40  LORazepam   Injectable 2 milliGRAM(s) IV Push every 3 hours PRN CIWA-Ar score 8 or greater  ondansetron Injectable 4 milliGRAM(s) IV Push every 8 hours PRN Nausea and/or Vomiting          Vital Signs Last 24 Hrs  T(C): 36.6 (20 Jan 2025 08:52), Max: 37.3 (19 Jan 2025 19:17)  T(F): 97.9 (20 Jan 2025 08:52), Max: 99.2 (19 Jan 2025 19:17)  HR: 79 (20 Jan 2025 09:01) (55 - 97)  BP: 146/111 (20 Jan 2025 07:00) (113/75 - 149/97)  BP(mean): 123 (20 Jan 2025 07:00) (87 - 123)  RR: 22 (20 Jan 2025 07:00) (12 - 24)  SpO2: 97% (20 Jan 2025 09:01) (95% - 98%)    Parameters below as of 20 Jan 2025 09:01  Patient On (Oxygen Delivery Method): room air    Daily     Daily I&O's Summary    19 Jan 2025 07:01  -  20 Jan 2025 07:00  --------------------------------------------------------  IN: 108 mL / OUT: 1000 mL / NET: -892 mL    20 Jan 2025 07:01  -  20 Jan 2025 11:13  --------------------------------------------------------  IN: 240 mL / OUT: 500 mL / NET: -260 mL        I&O's Detail    19 Jan 2025 07:01  -  20 Jan 2025 07:00  --------------------------------------------------------  IN:    Heparin Infusion: 108 mL  Total IN: 108 mL    OUT:    Voided (mL): 1000 mL  Total OUT: 1000 mL    Total NET: -892 mL      20 Jan 2025 07:01  -  20 Jan 2025 11:13  --------------------------------------------------------  IN:    Oral Fluid: 240 mL  Total IN: 240 mL    OUT:    Voided (mL): 500 mL  Total OUT: 500 mL    Total NET: -260 mL          I&O's Summary    19 Jan 2025 07:01  -  20 Jan 2025 07:00  --------------------------------------------------------  IN: 108 mL / OUT: 1000 mL / NET: -892 mL    20 Jan 2025 07:01  -  20 Jan 2025 11:13  --------------------------------------------------------  IN: 240 mL / OUT: 500 mL / NET: -260 mL        PHYSICAL EXAM:    Constitutional: NAD, awake and alert, well-developed  Neck:  supple,  No JVD  Respiratory: Breath sounds are mild decreased mild expiratory wheezing, no rales or rhonchi  Cardiovascular: S1 and S2, regular rate and rhythm, no Murmurs, gallops or rubs  Gastrointestinal: Bowel Sounds present, soft, nontender.   Extremities: No peripheral edema. No clubbing or cyanosis.  Vascular: 2+ peripheral pulses  Neurological: intubated   Musculoskeletal: no calf tenderness.  Skin: No rashes.        ===============================  ===============================  LABS:                         11.5   8.26  )-----------( 231      ( 20 Jan 2025 04:13 )             35.3                         11.0   8.48  )-----------( 247      ( 19 Jan 2025 04:00 )             33.0                         11.0   9.18  )-----------( 233      ( 18 Jan 2025 21:01 )             33.5     20 Jan 2025 04:13    139    |  108    |  11     ----------------------------<  119    3.8     |  27     |  0.60   19 Jan 2025 04:00    141    |  110    |  6      ----------------------------<  136    3.5     |  29     |  0.75   18 Jan 2025 12:25    139    |  109    |  11     ----------------------------<  159    3.5     |  23     |  0.71     Ca    8.3        20 Jan 2025 04:13  Ca    8.2        19 Jan 2025 04:00  Ca    8.5        18 Jan 2025 12:25  Phos  4.2       20 Jan 2025 04:13  Phos  3.3       19 Jan 2025 04:00  Phos  2.4       18 Jan 2025 05:29  Mg     2.7       20 Jan 2025 04:13  Mg     2.4       19 Jan 2025 04:00  Mg     2.1       18 Jan 2025 05:29    TPro  6.5    /  Alb  3.2    /  TBili  0.5    /  DBili  x      /  AST  66     /  ALT  79     /  AlkPhos  60     20 Jan 2025 04:13  TPro  6.2    /  Alb  3.1    /  TBili  0.3    /  DBili  x      /  AST  141    /  ALT  93     /  AlkPhos  56     19 Jan 2025 04:00  TPro  6.3    /  Alb  3.2    /  TBili  0.5    /  DBili  x      /  AST  428    /  ALT  125    /  AlkPhos  65     18 Jan 2025 05:29    PT/INR - ( 18 Jan 2025 12:25 )   PT: 10.6 sec;   INR: 0.92 ratio         PTT - ( 20 Jan 2025 04:13 )  PTT:59.1 sec  ===============================  ===============================  CARDIAC BIOMARKERS:  BNP      TROPONIN  Troponin I, High Sensitivity Result: 126.98 ng/L *H* (01-20-25 @ 04:13)  Troponin I, High Sensitivity Result: 431.64 ng/L *H* (01-19-25 @ 04:00)  Troponin I, High Sensitivity Result: 768.03 ng/L *H* (01-18-25 @ 12:25)  Troponin I, High Sensitivity Result: 408.62 ng/L *H* (01-18-25 @ 05:29)  Troponin I, High Sensitivity Result: 5.94 ng/L (01-17-25 @ 23:46)  Troponin I, High Sensitivity Result: 13.59 ng/L (01-16-25 @ 14:37)  Troponin I, High Sensitivity Result: 3.69 ng/L (12-23-24 @ 11:06)  Troponin I, High Sensitivity Result: 3.95 ng/L (11-03-24 @ 23:47)  Troponin I, High Sensitivity Result: 9.73 ng/L (10-20-24 @ 19:26)  Troponin I, High Sensitivity Result: 4.42 ng/L (08-25-24 @ 17:25)  Troponin I, High Sensitivity Result: 4.16 ng/L (01-01-24 @ 20:13)    ===============================  ===============================    sinus tachycardia immediately after intubate , sinus tachycardia ST depression inferior leads  ( compared to prior EKG )  1/18/24 sinus rhythm      Snus rhythm  J point ST elevation Inferolateral leads  mild T inversion AVL       Strips reviewed , patient did have   tachycardiac  , brief Atrial afib on 1/17/24   on monitor strip review , transient ST elevated noted on monitor strip    < from: TTE W or WO Ultrasound Enhancing Agent (01.18.25 @ 11:13) >      1. Left ventricular systolic function is normal with an ejection fraction estimated at 55 to 60 %.   2. Normal right ventricular systolic function.  no prericardial effusion   < end of copied text >    < from: CT Angio Chest PE Protocol w/ IV Cont (01.18.25 @ 10:25) >      IMPRESSION:  No evidence of pulmonary embolism.    < end of copied text >    ===============================    Mauricio Vinson M.D.  Cardiology, [facility]  Cell: [phone]  Offices:    ([facility] Office)  [phone] ([facility] Office)

## 2025-01-20 NOTE — PROGRESS NOTE ADULT - SUBJECTIVE AND OBJECTIVE BOX
CC:    HPI:  53 y/o male came to  due to having SOB for the past week. Patient states that he doesn't feel too bad at rest, but when he exerts himself he becomes very SOB. Patient states that he does not F/U outpatient and/or take meds to help prevent his asthma/COPD exacerbations because he does not have insurance. Patient states that he feels better after the breathing treatments and steroids. Patient Patient had CP and S/P Cardiac arrest, NSTEMI, Now extubate    1/20: No events over night, for Cath on 1/21        SOCIAL HX  former smoker  age [age]  daily alcohol in EMR  denied drugs    Meds: None     (16 Jan 2025 23:41)       PAST MEDICAL & SURGICAL HISTORY:  Asthma      ETOH abuse      No significant past surgical history          FAMILY HISTORY:      Social Hx:    Allergies    No Known Allergies    Intolerances            ICU Vital Signs Last 24 Hrs  T(C): 36.6 (20 Jan 2025 08:52), Max: 37.3 (19 Jan 2025 19:17)  T(F): 97.9 (20 Jan 2025 08:52), Max: 99.2 (19 Jan 2025 19:17)  HR: 79 (20 Jan 2025 09:01) (55 - 97)  BP: 146/111 (20 Jan 2025 07:00) (113/75 - 149/97)  BP(mean): 123 (20 Jan 2025 07:00) (87 - 123)  ABP: --  ABP(mean): --  RR: 22 (20 Jan 2025 07:00) (12 - 24)  SpO2: 97% (20 Jan 2025 09:01) (95% - 98%)    O2 Parameters below as of 20 Jan 2025 09:01  Patient On (Oxygen Delivery Method): room air                I&O's Summary    19 Jan 2025 07:01  -  20 Jan 2025 07:00  --------------------------------------------------------  IN: 108 mL / OUT: 1000 mL / NET: -892 mL    20 Jan 2025 07:01  -  20 Jan 2025 11:25  --------------------------------------------------------  IN: 240 mL / OUT: 500 mL / NET: -260 mL                              11.5   8.26  )-----------( 231      ( 20 Jan 2025 04:13 )             35.3       01-20    139  |  108  |  11  ----------------------------<  119[H]  3.8   |  27  |  0.60    Ca    8.3[L]      20 Jan 2025 04:13  Phos  4.2     01-20  Mg     2.7     01-20    TPro  6.5  /  Alb  3.2[L]  /  TBili  0.5  /  DBili  x   /  AST  66[H]  /  ALT  79[H]  /  AlkPhos  60  01-20                Urinalysis Basic - ( 20 Jan 2025 04:13 )    Color: x / Appearance: x / SG: x / pH: x  Gluc: 119 mg/dL / Ketone: x  / Bili: x / Urobili: x   Blood: x / Protein: x / Nitrite: x   Leuk Esterase: x / RBC: x / WBC x   Sq Epi: x / Non Sq Epi: x / Bacteria: x        MEDICATIONS  (STANDING):  albuterol/ipratropium for Nebulization 3 milliLiter(s) Nebulizer every 6 hours  aspirin  chewable 81 milliGRAM(s) Oral daily  atorvastatin 40 milliGRAM(s) Oral at bedtime  chlorhexidine 2% Cloths 1 Application(s) Topical <User Schedule>  folic acid Injectable 1 milliGRAM(s) IV Push daily  heparin  Infusion.  Unit(s)/Hr (6 mL/Hr) IV Continuous <Continuous>  influenza   Vaccine 0.5 milliLiter(s) IntraMuscular once  magnesium hydroxide Suspension 30 milliLiter(s) Oral daily  methylPREDNISolone sodium succinate Injectable 20 milliGRAM(s) IV Push every 12 hours  polyethylene glycol 3350 17 Gram(s) Oral two times a day  senna 2 Tablet(s) Oral at bedtime  thiamine IVPB 500 milliGRAM(s) IV Intermittent every 8 hours    MEDICATIONS  (PRN):  acetaminophen     Tablet .. 650 milliGRAM(s) Oral every 6 hours PRN Temp greater or equal to 38C (100.4F), Mild Pain (1 - 3)  albuterol    90 MICROgram(s) HFA Inhaler 2 Puff(s) Inhalation every 4 hours PRN Shortness of Breath and/or Wheezing  aluminum hydroxide/magnesium hydroxide/simethicone Suspension 30 milliLiter(s) Oral every 4 hours PRN Dyspepsia  heparin   Injectable 2900 Unit(s) IV Push every 6 hours PRN For aPTT less than 40  LORazepam   Injectable 2 milliGRAM(s) IV Push every 3 hours PRN CIWA-Ar score 8 or greater  ondansetron Injectable 4 milliGRAM(s) IV Push every 8 hours PRN Nausea and/or Vomiting      DVT Prophylaxis: UFH    Advanced Directives:  Discussed with:    Visit Information:    ** Time is exclusive of billed procedures and/or teaching and/or routine family updates.

## 2025-01-20 NOTE — PROGRESS NOTE ADULT - CARDIOVASCULAR
regular rate and rhythm/S1 S2 present/no pedal edema
regular rate and rhythm/S1 S2 present/no pedal edema
regular rate and rhythm/S1 S2 present

## 2025-01-20 NOTE — PROGRESS NOTE ADULT - ASSESSMENT
53 y/o male PMH asthma, traumatic SAH in 2019, ETOH abuse, recent hospitalization and ED visits for SOB, notes suggest regular ETOH use, presented for SOB    Admitted for asthma exacerbation     On 1/17, he had an episode where he clenched his chest followed by unresponsive, seizure like activity, found to be pulseless (ST/SVT on monitor), started CPR, ROSC obtained after 1 round of Epi and approx 3 mins CPR     Cause of cardiac arrest not clear, could be primary cardiac v neuro v resp     Subsequent CT head neg for injury  CT chest/abd/pelvis without critical findings (prelim)     EKG with ischemic changes     Intubated and on sedation to control seizure like activity       Problem list:     Cardiac arrest (PEA)  Acute encephalopathy   Acute resp failure   Likely alcohol withdrawal seizure   ACS/NSTEMI       Plan:     -neuro exam improved, now following commands, moves all extremities, EEG without epileptiform activity   -continue high dose thiamine and FA, will start CIWA protocol post-extubation   -continue aspirin, iv heparin, statin. HbA1C 5.7, check lipid profile, TTE with normal EF, RV fn. Repeat EKG with improved ischemic changes,   - Cath on 1/21  -BP and HR stable at this time, continue telemetry   - Solumedrol 20 q12h, continue DuoNebs   -BCx neg, WBC normal, no fever, no evidence of infection on CTs, will monitor off abx. NO SEPSIS   -NPO after MN  -monitor renal fn, trend and replete lytes prn  -DVT ppx with iv heparin       Patient critically ill     Transfer to tele

## 2025-01-21 LAB
ANION GAP SERPL CALC-SCNC: 4 MMOL/L — LOW (ref 5–17)
ANION GAP SERPL CALC-SCNC: 5 MMOL/L — SIGNIFICANT CHANGE UP (ref 5–17)
APTT BLD: 63.4 SEC — HIGH (ref 24.5–35.6)
BUN SERPL-MCNC: 20 MG/DL — SIGNIFICANT CHANGE UP (ref 7–23)
BUN SERPL-MCNC: 22 MG/DL — SIGNIFICANT CHANGE UP (ref 7–23)
CALCIUM SERPL-MCNC: 8.5 MG/DL — SIGNIFICANT CHANGE UP (ref 8.5–10.1)
CALCIUM SERPL-MCNC: 8.9 MG/DL — SIGNIFICANT CHANGE UP (ref 8.5–10.1)
CHLORIDE SERPL-SCNC: 106 MMOL/L — SIGNIFICANT CHANGE UP (ref 96–108)
CHLORIDE SERPL-SCNC: 106 MMOL/L — SIGNIFICANT CHANGE UP (ref 96–108)
CO2 SERPL-SCNC: 26 MMOL/L — SIGNIFICANT CHANGE UP (ref 22–31)
CO2 SERPL-SCNC: 26 MMOL/L — SIGNIFICANT CHANGE UP (ref 22–31)
CREAT SERPL-MCNC: 0.61 MG/DL — SIGNIFICANT CHANGE UP (ref 0.5–1.3)
CREAT SERPL-MCNC: 0.76 MG/DL — SIGNIFICANT CHANGE UP (ref 0.5–1.3)
CULTURE RESULTS: SIGNIFICANT CHANGE UP
CULTURE RESULTS: SIGNIFICANT CHANGE UP
EGFR: 108 ML/MIN/1.73M2 — SIGNIFICANT CHANGE UP
EGFR: 116 ML/MIN/1.73M2 — SIGNIFICANT CHANGE UP
GLUCOSE SERPL-MCNC: 119 MG/DL — HIGH (ref 70–99)
GLUCOSE SERPL-MCNC: 98 MG/DL — SIGNIFICANT CHANGE UP (ref 70–99)
HCT VFR BLD CALC: 39.4 % — SIGNIFICANT CHANGE UP (ref 39–50)
HCT VFR BLD CALC: 39.4 % — SIGNIFICANT CHANGE UP (ref 39–50)
HGB BLD-MCNC: 12.9 G/DL — LOW (ref 13–17)
HGB BLD-MCNC: 13 G/DL — SIGNIFICANT CHANGE UP (ref 13–17)
MAGNESIUM SERPL-MCNC: 2.3 MG/DL — SIGNIFICANT CHANGE UP (ref 1.6–2.6)
MAGNESIUM SERPL-MCNC: 2.9 MG/DL — HIGH (ref 1.6–2.6)
MCHC RBC-ENTMCNC: 28.7 PG — SIGNIFICANT CHANGE UP (ref 27–34)
MCHC RBC-ENTMCNC: 29 PG — SIGNIFICANT CHANGE UP (ref 27–34)
MCHC RBC-ENTMCNC: 32.7 G/DL — SIGNIFICANT CHANGE UP (ref 32–36)
MCHC RBC-ENTMCNC: 33 G/DL — SIGNIFICANT CHANGE UP (ref 32–36)
MCV RBC AUTO: 87.8 FL — SIGNIFICANT CHANGE UP (ref 80–100)
MCV RBC AUTO: 87.8 FL — SIGNIFICANT CHANGE UP (ref 80–100)
PHOSPHATE SERPL-MCNC: 4.1 MG/DL — SIGNIFICANT CHANGE UP (ref 2.5–4.5)
PHOSPHATE SERPL-MCNC: 4.8 MG/DL — HIGH (ref 2.5–4.5)
PLATELET # BLD AUTO: 259 K/UL — SIGNIFICANT CHANGE UP (ref 150–400)
PLATELET # BLD AUTO: 264 K/UL — SIGNIFICANT CHANGE UP (ref 150–400)
POTASSIUM SERPL-MCNC: 3.7 MMOL/L — SIGNIFICANT CHANGE UP (ref 3.5–5.3)
POTASSIUM SERPL-MCNC: 4.5 MMOL/L — SIGNIFICANT CHANGE UP (ref 3.5–5.3)
POTASSIUM SERPL-SCNC: 3.7 MMOL/L — SIGNIFICANT CHANGE UP (ref 3.5–5.3)
POTASSIUM SERPL-SCNC: 4.5 MMOL/L — SIGNIFICANT CHANGE UP (ref 3.5–5.3)
RBC # BLD: 4.49 M/UL — SIGNIFICANT CHANGE UP (ref 4.2–5.8)
RBC # BLD: 4.49 M/UL — SIGNIFICANT CHANGE UP (ref 4.2–5.8)
RBC # FLD: 17.7 % — HIGH (ref 10.3–14.5)
RBC # FLD: 17.8 % — HIGH (ref 10.3–14.5)
SODIUM SERPL-SCNC: 136 MMOL/L — SIGNIFICANT CHANGE UP (ref 135–145)
SODIUM SERPL-SCNC: 137 MMOL/L — SIGNIFICANT CHANGE UP (ref 135–145)
SPECIMEN SOURCE: SIGNIFICANT CHANGE UP
SPECIMEN SOURCE: SIGNIFICANT CHANGE UP
TROPONIN I, HIGH SENSITIVITY RESULT: 53.12 NG/L — SIGNIFICANT CHANGE UP
WBC # BLD: 5.9 K/UL — SIGNIFICANT CHANGE UP (ref 3.8–10.5)
WBC # BLD: 6.7 K/UL — SIGNIFICANT CHANGE UP (ref 3.8–10.5)
WBC # FLD AUTO: 5.9 K/UL — SIGNIFICANT CHANGE UP (ref 3.8–10.5)
WBC # FLD AUTO: 6.7 K/UL — SIGNIFICANT CHANGE UP (ref 3.8–10.5)

## 2025-01-21 PROCEDURE — 93010 ELECTROCARDIOGRAM REPORT: CPT

## 2025-01-21 PROCEDURE — 99232 SBSQ HOSP IP/OBS MODERATE 35: CPT

## 2025-01-21 PROCEDURE — 99152 MOD SED SAME PHYS/QHP 5/>YRS: CPT

## 2025-01-21 PROCEDURE — 93458 L HRT ARTERY/VENTRICLE ANGIO: CPT | Mod: 26

## 2025-01-21 RX ORDER — METHYLPREDNISOLONE ACETATE 40 MG/ML
40 VIAL (ML) INJECTION EVERY 12 HOURS
Refills: 0 | Status: DISCONTINUED | OUTPATIENT
Start: 2025-01-22 | End: 2025-01-23

## 2025-01-21 RX ORDER — BACTERIOSTATIC SODIUM CHLORIDE 0.9 %
250 VIAL (ML) INJECTION ONCE
Refills: 0 | Status: COMPLETED | OUTPATIENT
Start: 2025-01-21 | End: 2025-01-21

## 2025-01-21 RX ORDER — TRAMADOL HYDROCHLORIDE 100 MG/1
25 TABLET, EXTENDED RELEASE ORAL ONCE
Refills: 0 | Status: DISCONTINUED | OUTPATIENT
Start: 2025-01-21 | End: 2025-01-21

## 2025-01-21 RX ORDER — LIDOCAINE HYDROCHLORIDE 30 MG/G
1 CREAM TOPICAL DAILY
Refills: 0 | Status: DISCONTINUED | OUTPATIENT
Start: 2025-01-21 | End: 2025-01-23

## 2025-01-21 RX ORDER — BACTERIOSTATIC SODIUM CHLORIDE 0.9 %
1000 VIAL (ML) INJECTION
Refills: 0 | Status: DISCONTINUED | OUTPATIENT
Start: 2025-01-21 | End: 2025-01-23

## 2025-01-21 RX ADMIN — TRAMADOL HYDROCHLORIDE 25 MILLIGRAM(S): 100 TABLET, EXTENDED RELEASE ORAL at 22:03

## 2025-01-21 RX ADMIN — IPRATROPIUM BROMIDE AND ALBUTEROL SULFATE 3 MILLILITER(S): .5; 2.5 SOLUTION RESPIRATORY (INHALATION) at 13:34

## 2025-01-21 RX ADMIN — Medication 250 MILLILITER(S): at 11:19

## 2025-01-21 RX ADMIN — POLYETHYLENE GLYCOL 3350 17 GRAM(S): 17 POWDER, FOR SOLUTION ORAL at 22:03

## 2025-01-21 RX ADMIN — IPRATROPIUM BROMIDE AND ALBUTEROL SULFATE 3 MILLILITER(S): .5; 2.5 SOLUTION RESPIRATORY (INHALATION) at 20:28

## 2025-01-21 RX ADMIN — TRAMADOL HYDROCHLORIDE 25 MILLIGRAM(S): 100 TABLET, EXTENDED RELEASE ORAL at 22:40

## 2025-01-21 RX ADMIN — LIDOCAINE HYDROCHLORIDE 1 PATCH: 30 CREAM TOPICAL at 22:02

## 2025-01-21 RX ADMIN — ATORVASTATIN CALCIUM 40 MILLIGRAM(S): 80 TABLET, FILM COATED ORAL at 22:04

## 2025-01-21 RX ADMIN — Medication 1050 UNIT(S)/HR: at 07:19

## 2025-01-21 RX ADMIN — IPRATROPIUM BROMIDE AND ALBUTEROL SULFATE 3 MILLILITER(S): .5; 2.5 SOLUTION RESPIRATORY (INHALATION) at 02:38

## 2025-01-21 RX ADMIN — Medication 1050 UNIT(S)/HR: at 07:17

## 2025-01-21 RX ADMIN — Medication 1050 UNIT(S)/HR: at 00:27

## 2025-01-21 RX ADMIN — ACETAMINOPHEN 650 MILLIGRAM(S): 160 SUSPENSION ORAL at 17:16

## 2025-01-21 RX ADMIN — Medication 20 MILLIGRAM(S): at 17:25

## 2025-01-21 RX ADMIN — ASPIRIN 81 MILLIGRAM(S): 81 TABLET, COATED ORAL at 09:49

## 2025-01-21 RX ADMIN — Medication 135 MILLILITER(S): at 13:03

## 2025-01-21 RX ADMIN — IPRATROPIUM BROMIDE AND ALBUTEROL SULFATE 3 MILLILITER(S): .5; 2.5 SOLUTION RESPIRATORY (INHALATION) at 08:09

## 2025-01-21 RX ADMIN — Medication 2 TABLET(S): at 22:04

## 2025-01-21 RX ADMIN — Medication 20 MILLIGRAM(S): at 05:41

## 2025-01-21 NOTE — CONSULT NOTE ADULT - SUBJECTIVE AND OBJECTIVE BOX
HPI:  53 y/o male came to  due to having SOB for the past week. Patient states that he doesn't feel too bad at rest, but when he exerts himself he becomes very SOB. Patient states that he does not F/U outpatient and/or take meds to help prevent his asthma/COPD exacerbations because he does not have insurance. Patient states that he feels better after the breathing treatments and steroids. patient was feeling fine until yesterday evening ,( from ICU nurse/critical care attending ) patient was walking back to bed from rest room , was tachycardic at that time , patient apparently grabbed his chest , collapsed on to the bed ,patient did have seizure like activity , patient was intubated , was very hypertensive at that time , ekg showed  ST depression at that time , patient blood work showed elevated troponin   +NSTEMI   Pt. referred for [facility] [place] for further evaluation     Subjective/Observations: Pt. seen and examined and evaluated. Pt. resting comfortably in bed in NAD, with no respiratory distress, no chest pain, dyspnea, palpitations, PND, or orthopnea.    REVIEW OF SYSTEMS: All other review of systems is negative unless indicated above    PAST MEDICAL & SURGICAL HISTORY:  Asthma  ETOH abuse  No significant past surgical history    MEDICATIONS  (STANDING):  albuterol/ipratropium for Nebulization 3 milliLiter(s) Nebulizer every 6 hours  aspirin  chewable 81 milliGRAM(s) Oral daily  atorvastatin 40 milliGRAM(s) Oral at bedtime  chlorhexidine 2% Cloths 1 Application(s) Topical <User Schedule>  heparin  Infusion.  Unit(s)/Hr (6 mL/Hr) IV Continuous <Continuous>  influenza   Vaccine 0.5 milliLiter(s) IntraMuscular once  magnesium hydroxide Suspension 30 milliLiter(s) Oral daily  methylPREDNISolone sodium succinate Injectable 20 milliGRAM(s) IV Push every 12 hours  polyethylene glycol 3350 17 Gram(s) Oral two times a day  senna 2 Tablet(s) Oral at bedtime  sodium chloride 0.9% Bolus 250 milliLiter(s) IV Bolus once    MEDICATIONS  (PRN):  acetaminophen     Tablet .. 650 milliGRAM(s) Oral every 6 hours PRN Temp greater or equal to 38C (100.4F), Mild Pain (1 - 3)  albuterol    90 MICROgram(s) HFA Inhaler 2 Puff(s) Inhalation every 4 hours PRN Shortness of Breath and/or Wheezing  aluminum hydroxide/magnesium hydroxide/simethicone Suspension 30 milliLiter(s) Oral every 4 hours PRN Dyspepsia  heparin   Injectable 2900 Unit(s) IV Push every 6 hours PRN For aPTT less than 40  ondansetron Injectable 4 milliGRAM(s) IV Push every 8 hours PRN Nausea and/or Vomiting      Allergies: No Known Allergies    Vital Signs Last 24 Hrs  T(C): 36.3 (21 Jan 2025 10:25), Max: 36.9 (21 Jan 2025 08:48)  T(F): 97.4 (21 Jan 2025 10:25), Max: 98.4 (21 Jan 2025 08:48)  HR: 72 (21 Jan 2025 10:25) (60 - 81)  BP: 146/95 (21 Jan 2025 10:25) (113/75 - 151/95)  BP(mean): 105 (20 Jan 2025 17:00) (88 - 115)  RR: 18 (21 Jan 2025 10:25) (14 - 18)  SpO2: 99% (21 Jan 2025 10:25) (95% - 99%)    LABS: All Labs Reviewed:                        12.9   5.90  )-----------( 259      ( 21 Jan 2025 06:21 )             39.4                          21 Jan 2025 06:21    137    |  106    |  20     ----------------------------<  98     3.7     |  26     |  0.61   Ca    8.9        21 Jan 2025 06:21  Phos  4.8       21 Jan 2025 06:21  Mg     2.9       21 Jan 2025 06:21      Echo:  < from: TTE W or WO Ultrasound Enhancing Agent (01.18.25 @ 11:13) >     1. Left ventricular systolic function is normal with an ejection fraction estimated at 55 to 60 %.   2. Normal right ventricular systolic function.    Interpretation of Telemetry:      Physical Exam:  Appearance: [ ] Normal  [ ] abnormal [X ] NAD   Cardiovascular: [X ] S1 [X ] S2 [ ] RRR [ ] m/r/g [ ]edema [ ] JVP  Respiratory: [X ] Clear to auscultation bilaterally  Psychiatry: [X ] AAOx3  [ ] confused [ ] disoriented [ ] Mood & affect appropriate  Skin: [ ]  rashes [ ] ecchymoses [ ] cyanosis        Assessment and Plan:   · Assessment	  53 y/o male came to  due to having SOB for the past week. Patient states that he doesn't feel too bad at rest, but when he exerts himself he becomes very SOB. Patient states that he does not F/U outpatient and/or take meds to help prevent his asthma/COPD exacerbations because he does not have insurance. Patient was feeling fine until yesterday evening ,( from ICU nurse/critical care attending ) patient was walking back to bed from rest room , was tachycardic at that time , patient apparently grabbed his chest , collapsed on to the bed, patient was intubated , was very hypertensive at that time , ekg showed  ST depression at that time , patient blood work showed elevated troponin   +NSTEMI   Pt. now extubated   Pt. referred for [facility] for further evaluation   Consent obtained for and signed for cardiac cath with coronary angiogram and possible stent placement with possible sedation and analgesia. LHC risks, benefits and alternatives discussed with patient. Risk discussed included, but not limited to MI, stroke, mortality, major bleeding, arrythmia, or infection, educational material provided. Pt. verbalizes and understands pre-procedural instructions.   ASA:  Bleeding Risk Score:  Creatinine:  GFR:    Brijesh Score:      HPI:  53 y/o male came to  due to having SOB for the past week. Patient states that he doesn't feel too bad at rest, but when he exerts himself he becomes very SOB. Patient states that he does not F/U outpatient and/or take meds to help prevent his asthma/COPD exacerbations because he does not have insurance. Patient states that he feels better after the breathing treatments and steroids. patient was feeling fine until yesterday evening ,( from ICU nurse/critical care attending ) patient was walking back to bed from rest room , was tachycardic at that time , patient apparently grabbed his chest , collapsed on to the bed ,patient did have seizure like activity , patient was intubated , was very hypertensive at that time , ekg showed  ST depression at that time , patient blood work showed elevated troponin   +NSTEMI   Pt. referred for [facility] for further evaluation     Subjective/Observations: Pt. seen and examined and evaluated. Pt. resting comfortably in bed in NAD, with no respiratory distress, no chest pain, dyspnea, palpitations, PND, or orthopnea.    REVIEW OF SYSTEMS: All other review of systems is negative unless indicated above    PAST MEDICAL & SURGICAL HISTORY:  Asthma  ETOH abuse  No significant past surgical history    MEDICATIONS  (STANDING):  albuterol/ipratropium for Nebulization 3 milliLiter(s) Nebulizer every 6 hours  aspirin  chewable 81 milliGRAM(s) Oral daily  atorvastatin 40 milliGRAM(s) Oral at bedtime  chlorhexidine 2% Cloths 1 Application(s) Topical <User Schedule>  heparin  Infusion.  Unit(s)/Hr (6 mL/Hr) IV Continuous <Continuous>  influenza   Vaccine 0.5 milliLiter(s) IntraMuscular once  magnesium hydroxide Suspension 30 milliLiter(s) Oral daily  methylPREDNISolone sodium succinate Injectable 20 milliGRAM(s) IV Push every 12 hours  polyethylene glycol 3350 17 Gram(s) Oral two times a day  senna 2 Tablet(s) Oral at bedtime  sodium chloride 0.9% Bolus 250 milliLiter(s) IV Bolus once    MEDICATIONS  (PRN):  acetaminophen     Tablet .. 650 milliGRAM(s) Oral every 6 hours PRN Temp greater or equal to 38C (100.4F), Mild Pain (1 - 3)  albuterol    90 MICROgram(s) HFA Inhaler 2 Puff(s) Inhalation every 4 hours PRN Shortness of Breath and/or Wheezing  aluminum hydroxide/magnesium hydroxide/simethicone Suspension 30 milliLiter(s) Oral every 4 hours PRN Dyspepsia  heparin   Injectable 2900 Unit(s) IV Push every 6 hours PRN For aPTT less than 40  ondansetron Injectable 4 milliGRAM(s) IV Push every 8 hours PRN Nausea and/or Vomiting      Allergies: No Known Allergies    Vital Signs Last 24 Hrs  T(C): 36.3 (21 Jan 2025 10:25), Max: 36.9 (21 Jan 2025 08:48)  T(F): 97.4 (21 Jan 2025 10:25), Max: 98.4 (21 Jan 2025 08:48)  HR: 72 (21 Jan 2025 10:25) (60 - 81)  BP: 146/95 (21 Jan 2025 10:25) (113/75 - 151/95)  BP(mean): 105 (20 Jan 2025 17:00) (88 - 115)  RR: 18 (21 Jan 2025 10:25) (14 - 18)  SpO2: 99% (21 Jan 2025 10:25) (95% - 99%)    LABS: All Labs Reviewed:                        12.9   5.90  )-----------( 259      ( 21 Jan 2025 06:21 )             39.4                          21 Jan 2025 06:21    137    |  106    |  20     ----------------------------<  98     3.7     |  26     |  0.61   Ca    8.9        21 Jan 2025 06:21  Phos  4.8       21 Jan 2025 06:21  Mg     2.9       21 Jan 2025 06:21      Echo:  < from: TTE W or WO Ultrasound Enhancing Agent (01.18.25 @ 11:13) >     1. Left ventricular systolic function is normal with an ejection fraction estimated at 55 to 60 %.   2. Normal right ventricular systolic function.    Interpretation of Telemetry:      Physical Exam:  Appearance: [ ] Normal  [ ] abnormal [X ] NAD   Cardiovascular: [X ] S1 [X ] S2 [ ] RRR [ ] m/r/g [ ]edema [ ] JVP  Respiratory: [X ] Clear to auscultation bilaterally  Psychiatry: [X ] AAOx3  [ ] confused [ ] disoriented [ ] Mood & affect appropriate  Skin: [ ]  rashes [ ] ecchymoses [ ] cyanosis

## 2025-01-21 NOTE — PROGRESS NOTE ADULT - SUBJECTIVE AND OBJECTIVE BOX
HPI:  55 y/o male came to  due to having SOB for the past week. Patient states that he doesn't feel too bad at rest, but when he exerts himself he becomes very SOB. Patient states that he does not F/U outpatient and/or take meds to help prevent his asthma/COPD exacerbations because he does not have insurance. Patient states that he feels better after the breathing treatments and steroids. patient was feeling fine until yesterday evening ,( from ICU nurse/critical care attending ) patient was walking back to bed from rest room , was tachycardic at that time , patient apparently grabbed his chest , collapsed on to the bed ,patient did have seizure like activity , patient was intubated , was very hypertensive at that time , ekg showed  ST depression at that time , patient blood work showed elevated troponin   +NSTEMI   Pt. referred for [facility] for further evaluation     Subjective/Observations: Pt. seen and examined and evaluated. Pt. resting comfortably in bed in NAD, with no respiratory distress, no chest pain, dyspnea, palpitations, PND, or orthopnea.    REVIEW OF SYSTEMS: All other review of systems is negative unless indicated above    PAST MEDICAL & SURGICAL HISTORY:  Asthma  ETOH abuse  No significant past surgical history    MEDICATIONS  (STANDING):  albuterol/ipratropium for Nebulization 3 milliLiter(s) Nebulizer every 6 hours  aspirin  chewable 81 milliGRAM(s) Oral daily  atorvastatin 40 milliGRAM(s) Oral at bedtime  chlorhexidine 2% Cloths 1 Application(s) Topical <User Schedule>  heparin  Infusion.  Unit(s)/Hr (6 mL/Hr) IV Continuous <Continuous>  influenza   Vaccine 0.5 milliLiter(s) IntraMuscular once  magnesium hydroxide Suspension 30 milliLiter(s) Oral daily  methylPREDNISolone sodium succinate Injectable 20 milliGRAM(s) IV Push every 12 hours  polyethylene glycol 3350 17 Gram(s) Oral two times a day  senna 2 Tablet(s) Oral at bedtime  sodium chloride 0.9% Bolus 250 milliLiter(s) IV Bolus once    MEDICATIONS  (PRN):  acetaminophen     Tablet .. 650 milliGRAM(s) Oral every 6 hours PRN Temp greater or equal to 38C (100.4F), Mild Pain (1 - 3)  albuterol    90 MICROgram(s) HFA Inhaler 2 Puff(s) Inhalation every 4 hours PRN Shortness of Breath and/or Wheezing  aluminum hydroxide/magnesium hydroxide/simethicone Suspension 30 milliLiter(s) Oral every 4 hours PRN Dyspepsia  heparin   Injectable 2900 Unit(s) IV Push every 6 hours PRN For aPTT less than 40  ondansetron Injectable 4 milliGRAM(s) IV Push every 8 hours PRN Nausea and/or Vomiting      Allergies: No Known Allergies    Vital Signs Last 24 Hrs  T(C): 36.3 (21 Jan 2025 10:25), Max: 36.9 (21 Jan 2025 08:48)  T(F): 97.4 (21 Jan 2025 10:25), Max: 98.4 (21 Jan 2025 08:48)  HR: 72 (21 Jan 2025 10:25) (60 - 81)  BP: 146/95 (21 Jan 2025 10:25) (113/75 - 151/95)  BP(mean): 105 (20 Jan 2025 17:00) (88 - 115)  RR: 18 (21 Jan 2025 10:25) (14 - 18)  SpO2: 99% (21 Jan 2025 10:25) (95% - 99%)    LABS: All Labs Reviewed:                        12.9   5.90  )-----------( 259      ( 21 Jan 2025 06:21 )             39.4                          21 Jan 2025 06:21    137    |  106    |  20     ----------------------------<  98     3.7     |  26     |  0.61   Ca    8.9        21 Jan 2025 06:21  Phos  4.8       21 Jan 2025 06:21  Mg     2.9       21 Jan 2025 06:21      Echo:  < from: TTE W or WO Ultrasound Enhancing Agent (01.18.25 @ 11:13) >     1. Left ventricular systolic function is normal with an ejection fraction estimated at 55 to 60 %.   2. Normal right ventricular systolic function.    Interpretation of Telemetry:      Physical Exam:  Appearance: [ ] Normal  [ ] abnormal [X ] NAD   Cardiovascular: [X ] S1 [X ] S2 [ ] RRR [ ] m/r/g [ ]edema [ ] JVP  Respiratory: [X ] Clear to auscultation bilaterally  Psychiatry: [X ] AAOx3  [ ] confused [ ] disoriented [ ] Mood & affect appropriate  Skin: [ ]  rashes [ ] ecchymoses [ ] cyanosis

## 2025-01-21 NOTE — DIETITIAN INITIAL EVALUATION ADULT - NSFNSGIIOFT_GEN_A_CORE
History & Physical    Patient: Varun Disla MRN: [id number]  [facility]: [id number]    YOB: 1941  Age: 83 y.o.  Sex: male      DOA: 1/20/2025    Chief Complaint:   Chief Complaint   Patient presents with    Abdominal Pain    Constipation          HPI:     83-year-old male past medical history of type 2 diabetes with nephropathy, prostate cancer, hypertension, hyperlipidemia, gout, CKD 3, splenic artery aneurysm who presents to the outside ED at [facility] for abdominal pain and constipation.  Patient describes constipation/no bowel meant for 2 days which prompted him to try an oral laxative and suppository with a successful bowel movement described as a large bowel movement this past Sunday morning.  In the next day patient with lower abdominal pain and nausea which prompted him to come to the ED for further evaluation.  At the bedside patient still endorses nausea with attempting orals, left lower quadrant pain that has markedly improved with IV morphine. Denies any bowel movement since admission but is passing flatus.   Denies to me any hematemesis, hematochezia, fevers chills.  A few episodes of diarrhea prior to admission. Non bloody.     No colonoscopies on file.    Initial vital signs in the ED with heart rates in the 110s otherwise good blood pressures.  Creatinine elevated at 1.8 from what seems to be baseline of 1.3-1.5.  Point-of-care lactate of 1.39.  Leukocytosis of WBC 19.0 with neutrophilic predominance.  UA negative for UTI.  Blood cultures were collected in the ED.  CT abdomen performed with IV contrast showed abnormal distal transverse to distal descending colon with diffuse wall thickening and regional inflammatory stranding. Findings are consistent with colitis of uncertain etiology.  Patient at the time was treated with 2 L bolus of normal saline, IV Zosyn once, 4 mg IV morphine x 2.  Looks like patient was admitted to inpatient for acute colitis of unknown  I&O's Detail  *none doc'd

## 2025-01-21 NOTE — PACU DISCHARGE NOTE - COMMENTS
Patient remains Alert/oriented x4. Patient with dressing to right wrist. No redness swelling pain or discharge noted at site. Patient with 0.9NS infusing at 135cc/hr x 6 hours. Report given to Krupa. Awaiting transport for escort to

## 2025-01-21 NOTE — PROGRESS NOTE ADULT - SUBJECTIVE AND OBJECTIVE BOX
CHIEF COMPLAINT: shortness' of breath     HPI:  53 y/o male came to  due to having SOB for the past week. Patient states that he doesn't feel too bad at rest, but when he exerts himself he becomes very SOB. Patient states that he does not F/U outpatient and/or take meds to help prevent his asthma/COPD exacerbations because he does not have insurance. Patient states that he feels better after the breathing treatments and steroids. patient was feeling fine until yesterday evening ,( from ICU nurse/critical care attending ) patient was walking back to bed from rest room , was tachycardic at that time , patient apparently grabbed his chest , collapsed on to the bed ,patient did have seizure like activity , patient was intubated , was very hypertensive at that time , ekg showed  ST depression at that time , patient blood work showed elevated troponin , repeat ekg showed resolved ST   currently intubated sedated ,     No prior cardiac hx on chart review ,    the sequence of events not clear      1/19/25 interpretor    #[id number],Patient extubated feeling , little sore throat from intubation , patient says he felt dizziness before he fell on to bed , can not give more details  denies any chest pain , does have mild sob ,  1/20/'25: no complaints  1/21/25:[place] speaking, Np fluent, no complaints, for [facility] today     INPATIENT  MEDICATIONS  (STANDING):  albuterol/ipratropium for Nebulization 3 milliLiter(s) Nebulizer every 6 hours  aspirin  chewable 81 milliGRAM(s) Oral daily  atorvastatin 40 milliGRAM(s) Oral at bedtime  chlorhexidine 2% Cloths 1 Application(s) Topical <User Schedule>  heparin  Infusion.  Unit(s)/Hr (6 mL/Hr) IV Continuous <Continuous>  influenza   Vaccine 0.5 milliLiter(s) IntraMuscular once  magnesium hydroxide Suspension 30 milliLiter(s) Oral daily  methylPREDNISolone sodium succinate Injectable 20 milliGRAM(s) IV Push every 12 hours  polyethylene glycol 3350 17 Gram(s) Oral two times a day  senna 2 Tablet(s) Oral at bedtime      MEDICATIONS  (PRN):  acetaminophen     Tablet .. 650 milliGRAM(s) Oral every 6 hours PRN Temp greater or equal to 38C (100.4F), Mild Pain (1 - 3)  albuterol    90 MICROgram(s) HFA Inhaler 2 Puff(s) Inhalation every 4 hours PRN Shortness of Breath and/or Wheezing  aluminum hydroxide/magnesium hydroxide/simethicone Suspension 30 milliLiter(s) Oral every 4 hours PRN Dyspepsia  heparin   Injectable 2900 Unit(s) IV Push every 6 hours PRN For aPTT less than 40  LORazepam   Injectable 2 milliGRAM(s) IV Push every 3 hours PRN CIWA-Ar score 8 or greater  ondansetron Injectable 4 milliGRAM(s) IV Push every 8 hours PRN Nausea and/or Vomiting      Vital Signs Last 24 Hrs  T(C): 36.3 (21 Jan 2025 10:25), Max: 36.9 (21 Jan 2025 08:48)  T(F): 97.4 (21 Jan 2025 10:25), Max: 98.4 (21 Jan 2025 08:48)  HR: 72 (21 Jan 2025 10:25) (60 - 81)  BP: 146/95 (21 Jan 2025 10:25) (113/75 - 151/95)  BP(mean): 105 (20 Jan 2025 17:00) (88 - 115)  RR: 18 (21 Jan 2025 10:25) (14 - 18)  SpO2: 99% (21 Jan 2025 10:25) (95% - 99%)    Parameters below as of 21 Jan 2025 10:13  Patient On (Oxygen Delivery Method): room air      PHYSICAL EXAM:    Constitutional: NAD, awake and alert, well-developed  Neck:  supple,  No JVD  Respiratory: Breath sounds are mild decreased mild expiratory wheezing, no rales or rhonchi  Cardiovascular: S1 and S2, regular rate and rhythm, no Murmurs, gallops or rubs  Gastrointestinal: Bowel Sounds present, soft, nontender.   Extremities: No peripheral edema. No clubbing or cyanosis.  Vascular: 2+ peripheral pulses  Neurological: intubated   Musculoskeletal: no calf tenderness.  Skin: No rashes.        ===============================  ===============================  LABS: reviewed                                    12.9   5.90  )-----------( 259      ( 21 Jan 2025 06:21 )             39.4     01-21    137  |  106  |  20  ----------------------------<  98  3.7   |  26  |  0.61    Ca    8.9      21 Jan 2025 06:21  Phos  4.8     01-21  Mg     2.9     01-21    TPro  6.5  /  Alb  3.2[L]  /  TBili  0.5  /  DBili  x   /  AST  66[H]  /  ALT  79[H]  /  AlkPhos  60  01-20    - TroponinI hsT: <-126.98, <-431.64, <-768.03, <-408.62, <-5.94    sinus tachycardia immediately after intubate , sinus tachycardia ST depression inferior leads  ( compared to prior EKG )  1/18/24 sinus rhythm      Snus rhythm  J point ST elevation Inferolateral leads  mild T inversion AVL       Strips reviewed , patient did have   tachycardiac  , brief Atrial afib on 1/17/24   on monitor strip review , transient ST elevated noted on monitor strip    < from: TTE W or WO Ultrasound Enhancing Agent (01.18.25 @ 11:13) >      1. Left ventricular systolic function is normal with an ejection fraction estimated at 55 to 60 %.   2. Normal right ventricular systolic function.  no prericardial effusion   < end of copied text >    < from: CT Angio Chest PE Protocol w/ IV Cont (01.18.25 @ 10:25) >      IMPRESSION:  No evidence of pulmonary embolism.    < end of copied text >    ===============================    Mauricio Vinson M.D.  Cardiology, [facility]  Cell: [phone]  Offices:   [phone] ([facility] Office)  [phone] ([facility] Office)    CHIEF COMPLAINT: shortness' of breath     HPI:  53 y/o male came to  due to having SOB for the past week. Patient states that he doesn't feel too bad at rest, but when he exerts himself he becomes very SOB. Patient states that he does not F/U outpatient and/or take meds to help prevent his asthma/COPD exacerbations because he does not have insurance. Patient states that he feels better after the breathing treatments and steroids. patient was feeling fine until yesterday evening ,( from ICU nurse/critical care attending ) patient was walking back to bed from rest room , was tachycardic at that time , patient apparently grabbed his chest , collapsed on to the bed ,patient did have seizure like activity , patient was intubated , was very hypertensive at that time , ekg showed  ST depression at that time , patient blood work showed elevated troponin , repeat ekg showed resolved ST   currently intubated sedated ,     No prior cardiac hx on chart review ,    the sequence of events not clear      1/19/25 interpretor    #[id number],Patient extubated feeling , little sore throat from intubation , patient says he felt dizziness before he fell on to bed , can not give more details  denies any chest pain , does have mild sob ,  1/20/'25: no complaints  1/21/25:[place] speaking, NP fluent s/l LHC - non obstructive CAD    INPATIENT  MEDICATIONS  (STANDING):  albuterol/ipratropium for Nebulization 3 milliLiter(s) Nebulizer every 6 hours  aspirin  chewable 81 milliGRAM(s) Oral daily  atorvastatin 40 milliGRAM(s) Oral at bedtime  chlorhexidine 2% Cloths 1 Application(s) Topical <User Schedule>  heparin  Infusion.  Unit(s)/Hr (6 mL/Hr) IV Continuous <Continuous>  influenza   Vaccine 0.5 milliLiter(s) IntraMuscular once  magnesium hydroxide Suspension 30 milliLiter(s) Oral daily  methylPREDNISolone sodium succinate Injectable 20 milliGRAM(s) IV Push every 12 hours  polyethylene glycol 3350 17 Gram(s) Oral two times a day  senna 2 Tablet(s) Oral at bedtime      MEDICATIONS  (PRN):  acetaminophen     Tablet .. 650 milliGRAM(s) Oral every 6 hours PRN Temp greater or equal to 38C (100.4F), Mild Pain (1 - 3)  albuterol    90 MICROgram(s) HFA Inhaler 2 Puff(s) Inhalation every 4 hours PRN Shortness of Breath and/or Wheezing  aluminum hydroxide/magnesium hydroxide/simethicone Suspension 30 milliLiter(s) Oral every 4 hours PRN Dyspepsia  heparin   Injectable 2900 Unit(s) IV Push every 6 hours PRN For aPTT less than 40  LORazepam   Injectable 2 milliGRAM(s) IV Push every 3 hours PRN CIWA-Ar score 8 or greater  ondansetron Injectable 4 milliGRAM(s) IV Push every 8 hours PRN Nausea and/or Vomiting      Vital Signs Last 24 Hrs  T(C): 36.3 (21 Jan 2025 10:25), Max: 36.9 (21 Jan 2025 08:48)  T(F): 97.4 (21 Jan 2025 10:25), Max: 98.4 (21 Jan 2025 08:48)  HR: 72 (21 Jan 2025 10:25) (60 - 81)  BP: 146/95 (21 Jan 2025 10:25) (113/75 - 151/95)  BP(mean): 105 (20 Jan 2025 17:00) (88 - 115)  RR: 18 (21 Jan 2025 10:25) (14 - 18)  SpO2: 99% (21 Jan 2025 10:25) (95% - 99%)    Parameters below as of 21 Jan 2025 10:13  Patient On (Oxygen Delivery Method): room air      PHYSICAL EXAM:    Constitutional: NAD, awake and alert, well-developed  Neck:  supple,  No JVD  Respiratory: Breath sounds are mild decreased mild expiratory wheezing, no rales or rhonchi  Cardiovascular: S1 and S2, regular rate and rhythm, no Murmurs, gallops or rubs  Gastrointestinal: Bowel Sounds present, soft, nontender.   Extremities: No peripheral edema. No clubbing or cyanosis.  Vascular: 2+ peripheral pulses  Neurological: intubated   Musculoskeletal: no calf tenderness.  Skin: No rashes.        ===============================  ===============================  LABS: reviewed                                    12.9   5.90  )-----------( 259      ( 21 Jan 2025 06:21 )             39.4     01-21    137  |  106  |  20  ----------------------------<  98  3.7   |  26  |  0.61    Ca    8.9      21 Jan 2025 06:21  Phos  4.8     01-21  Mg     2.9     01-21    TPro  6.5  /  Alb  3.2[L]  /  TBili  0.5  /  DBili  x   /  AST  66[H]  /  ALT  79[H]  /  AlkPhos  60  01-20    - TroponinI hsT: <-126.98, <-431.64, <-768.03, <-408.62, <-5.94    sinus tachycardia immediately after intubate , sinus tachycardia ST depression inferior leads  ( compared to prior EKG )  1/18/24 sinus rhythm      Snus rhythm  J point ST elevation Inferolateral leads  mild T inversion AVL       Strips reviewed , patient did have   tachycardiac  , brief Atrial afib on 1/17/24   on monitor strip review , transient ST elevated noted on monitor strip    < from: TTE W or WO Ultrasound Enhancing Agent (01.18.25 @ 11:13) >      1. Left ventricular systolic function is normal with an ejection fraction estimated at 55 to 60 %.   2. Normal right ventricular systolic function.  no prericardial effusion   < end of copied text >    < from: CT Angio Chest PE Protocol w/ IV Cont (01.18.25 @ 10:25) >      IMPRESSION:  No evidence of pulmonary embolism.    < end of copied text >    ===============================    Mauricio Vinson M.D.  Cardiology, [facility]  Cell: [phone]  Offices:    ([facility] Office)  [phone] ([facility] Office)

## 2025-01-21 NOTE — CONSULT NOTE ADULT - ASSESSMENT
55 y/o male came to  due to having SOB for the past week. Patient states that he doesn't feel too bad at rest, but when he exerts himself he becomes very SOB. Patient states that he does not F/U outpatient and/or take meds to help prevent his asthma/COPD exacerbations because he does not have insurance. Patient states that he feels better after the breathing treatments and steroids. pat seen for pulmonary eval. pat last night intubated for PEA, now on vent.    PROBLEMS;    Asthma/COPD Exacerbation  Cardiac arrest (PEA)  Acute encephalopathy   Acute resp failure   Likely alcohol withdrawal seizure   ACS/NSTEMI     PLAN;    VENT SUPPORT  IV heparin   IV steroid and DuoNebs  Monitor off abx. NO SEPSIS   Sliding scale insulin as on steroid   DVT ppx with iv heparin   
54 year old man with asthma, history of ETOH abuse, traumatic SAH admitted with SOB on exertion and treated for asthma exacerbation.  On 1/17 he was transferred to the CCU after ? loss of pulse, loss of consciousness.  He is currently intubated, sedated with midazolam and propofol.    -EEG today to look for any epileptiform activity. If positive will start an anticonvulsant  -After EEG can gradually wean sedation.  -If still having myoclonus, may repeat head CT  -Will f/u cardiology recs    discussed with Dr. Coates
53 y/o male came to  due to having SOB for the past week. Patient states that he doesn't feel too bad at rest, but when he exerts himself he becomes very SOB. Patient states that he does not F/U outpatient and/or take meds to help prevent his asthma/COPD exacerbations because he does not have insurance. Patient was feeling fine until yesterday evening ,( from ICU nurse/critical care attending ) patient was walking back to bed from rest room , was tachycardic at that time , patient apparently grabbed his chest , collapsed on to the bed, patient was intubated , was very hypertensive at that time , ekg showed  ST depression at that time , patient blood work showed elevated troponin   +NSTEMI   Pt. now extubated   Pt. referred for [facility] for further evaluation   Consent obtained for and signed for cardiac cath with coronary angiogram and possible stent placement with possible sedation and analgesia. C risks, benefits and alternatives discussed with patient. Risk discussed included, but not limited to MI, stroke, mortality, major bleeding, arrythmia, or infection, educational material provided. Pt. verbalizes and understands pre-procedural instructions.   ASA: II  Bleeding Risk Score: 1.2  Creatinine: 0.61  GFR: 116  Brijesh Score: 1 point

## 2025-01-21 NOTE — PROGRESS NOTE ADULT - PROBLEM SELECTOR PLAN 1
-unclear etiology  , possible cardiac , patient developed tachycardia initially , became pulse less  unresponsive ,  post event on monitor showed sinus tachycardia , brief afib , remain sinus rhythm , was hypertensive ,   -ischemic ST changes , elevated troponin peak 700s.    -ecotrin  statin , no BB as patient was admitted with exacerbation of COPD ,    -Normal ventricular function ,   -EKG showed hyperacute T with J point ST elevation similar to yesterday ekgs,  trending down troponin     no evidence of PE On CT angio ,    -[facility] today 1/21 -unclear etiology  , possible cardiac , patient developed tachycardia initially , became pulse less  unresponsive ,  post event on monitor showed sinus tachycardia , brief afib , remain sinus rhythm , was hypertensive ,   -ischemic ST changes , elevated troponin peak 700s.    -ecotrin  statin , no BB as patient was admitted with exacerbation of COPD ,    -Normal ventricular function ,   -EKG showed hyperacute T with J point ST elevation similar to yesterday ekgs,  trending down troponin     no evidence of PE On CT angio ,    - s/p LHC on 1/21 - non obstructive CAD, mild diffuse atherosclerosis LVEF 55%, LVEDP 16

## 2025-01-21 NOTE — PROGRESS NOTE ADULT - ASSESSMENT
53 y/o male came to  due to having SOB for the past week. Patient states that he doesn't feel too bad at rest, but when he exerts himself he becomes very SOB. Patient states that he does not F/U outpatient and/or take meds to help prevent his asthma/COPD exacerbations because he does not have insurance. Patient was feeling fine until yesterday evening ,( from ICU nurse/critical care attending ) patient was walking back to bed from rest room , was tachycardic at that time , patient apparently grabbed his chest , collapsed on to the bed, patient was intubated , was very hypertensive at that time , ekg showed  ST depression at that time , patient blood work showed elevated troponin   +NSTEMI   Pt. now extubated   Pt. referred for [facility] for further evaluation   Consent obtained for and signed for cardiac cath with coronary angiogram and possible stent placement with possible sedation and analgesia. C risks, benefits and alternatives discussed with patient. Risk discussed included, but not limited to MI, stroke, mortality, major bleeding, arrythmia, or infection, educational material provided. Pt. verbalizes and understands pre-procedural instructions.   ASA:  Bleeding Risk Score:  Creatinine:  GFR:    Brijesh Score:

## 2025-01-21 NOTE — PROGRESS NOTE ADULT - PROBLEM SELECTOR PLAN 2
elevated troponin in above setting ,  ASA/ statin , heparin , elevated troponin in above setting ,  ASA/ statin   - s/p C on 1/21 - non obstructive CAD, mild diffuse atherosclerosis LVEF 55%, LVEDP 16

## 2025-01-21 NOTE — PROGRESS NOTE ADULT - SUBJECTIVE AND OBJECTIVE BOX
Subjective:    pat better, still wheezing, s/p LHC neg.    MEDICATIONS  (STANDING):  albuterol/ipratropium for Nebulization 3 milliLiter(s) Nebulizer every 6 hours  aspirin  chewable 81 milliGRAM(s) Oral daily  atorvastatin 40 milliGRAM(s) Oral at bedtime  chlorhexidine 2% Cloths 1 Application(s) Topical <User Schedule>  influenza   Vaccine 0.5 milliLiter(s) IntraMuscular once  magnesium hydroxide Suspension 30 milliLiter(s) Oral daily  polyethylene glycol 3350 17 Gram(s) Oral two times a day  senna 2 Tablet(s) Oral at bedtime  sodium chloride 0.9%. 1000 milliLiter(s) (135 mL/Hr) IV Continuous <Continuous>    MEDICATIONS  (PRN):  acetaminophen     Tablet .. 650 milliGRAM(s) Oral every 6 hours PRN Temp greater or equal to 38C (100.4F), Mild Pain (1 - 3)  albuterol    90 MICROgram(s) HFA Inhaler 2 Puff(s) Inhalation every 4 hours PRN Shortness of Breath and/or Wheezing  aluminum hydroxide/magnesium hydroxide/simethicone Suspension 30 milliLiter(s) Oral every 4 hours PRN Dyspepsia  ondansetron Injectable 4 milliGRAM(s) IV Push every 8 hours PRN Nausea and/or Vomiting      Allergies    No Known Allergies    Intolerances        Vital Signs Last 24 Hrs  T(C): 36.6 (21 Jan 2025 15:51), Max: 36.9 (21 Jan 2025 08:48)  T(F): 97.8 (21 Jan 2025 15:51), Max: 98.4 (21 Jan 2025 08:48)  HR: 83 (21 Jan 2025 15:51) (60 - 83)  BP: 127/83 (21 Jan 2025 15:51) (106/78 - 150/114)  BP(mean): --  RR: 18 (21 Jan 2025 15:51) (16 - 18)  SpO2: 98% (21 Jan 2025 15:51) (95% - 99%)    Parameters below as of 21 Jan 2025 15:51  Patient On (Oxygen Delivery Method): room air          PHYSICAL EXAMINATION:    NECK:  Supple. No lymphadenopathy. Jugular venous pressure not elevated. Carotids equal.   HEART:   The cardiac impulse has a normal quality. Reg., Nl S1 and S2.  There are no murmurs, rubs or gallops noted  CHEST:  Chest rhonchi to auscultation. Normal respiratory effort.  ABDOMEN:  Soft and nontender.   EXTREMITIES:  There is no edema.       LABS:                        12.9   5.90  )-----------( 259      ( 21 Jan 2025 06:21 )             39.4     01-21    137  |  106  |  20  ----------------------------<  98  3.7   |  26  |  0.61    Ca    8.9      21 Jan 2025 06:21  Phos  4.8     01-21  Mg     2.9     01-21    TPro  6.5  /  Alb  3.2[L]  /  TBili  0.5  /  DBili  x   /  AST  66[H]  /  ALT  79[H]  /  AlkPhos  60  01-20    PTT - ( 21 Jan 2025 06:21 )  PTT:63.4 sec  Urinalysis Basic - ( 21 Jan 2025 06:21 )    Color: x / Appearance: x / SG: x / pH: x  Gluc: 98 mg/dL / Ketone: x  / Bili: x / Urobili: x   Blood: x / Protein: x / Nitrite: x   Leuk Esterase: x / RBC: x / WBC x   Sq Epi: x / Non Sq Epi: x / Bacteria: x

## 2025-01-21 NOTE — PROGRESS NOTE ADULT - SUBJECTIVE AND OBJECTIVE BOX
s/p LHC revealing non obstructive CAD. Denies chest pain, shortness of breath, dizziness or palpitations    PHYSICAL EXAM:  Constitutional: NAD  Neuro: Alert and oriented x 3  Speech clear No focal deficits  Respiratory: Expiratory wheezing   Cardiovascular: S1 and S2, RRR,   Gastrointestinal: BS+, soft, NT/ND  Extremities: No clubbing cyanosis or edema No varicosities  Vascular: 2+ peripheral pulses  Psychiatric: Normal mood, normal affect  Musculoskeletal: 5/5 strength b/l upper and lower extremities  Right radial hemoband removed by RN  Procedure site CDI, no bleeding, no hematoma, able to move all digits with capillary refill < 3 seconds, fingers warm  Vital Signs Last 24 Hrs  T(C): 36.4 (21 Jan 2025 12:10), Max: 36.9 (21 Jan 2025 08:48)  T(F): 97.5 (21 Jan 2025 12:10), Max: 98.4 (21 Jan 2025 08:48)  HR: 81 (21 Jan 2025 13:30) (60 - 81)  BP: 110/80 (21 Jan 2025 13:30) (106/78 - 151/95)  BP(mean): 105 (20 Jan 2025 17:00) (88 - 115)  RR: 18 (21 Jan 2025 13:30) (14 - 18)  SpO2: 99% (21 Jan 2025 13:30) (95% - 99%)    Parameters below as of 21 Jan 2025 13:30  Patient On (Oxygen Delivery Method): room air          HPI:  53 y/o male came to  due to having SOB for the past week. Patient states that he doesn't feel too bad at rest, but when he exerts himself he becomes very SOB. Patient states that he does not F/U outpatient and/or take meds to help prevent his asthma/COPD exacerbations because he does not have insurance. Patient states that he feels better after the breathing treatments and steroids. patient was feeling fine until yesterday evening ,( from ICU nurse/critical care attending ) patient was walking back to bed from rest room , was tachycardic at that time , patient apparently grabbed his chest , collapsed on to the bed ,patient did have seizure like activity , patient was intubated , was very hypertensive at that time , ekg showed  ST depression at that time , patient blood work showed elevated troponin   +NSTEMI. IC was consulted for ischemic evaluation. All procedures were explained to pt via  #[id number] who agreed   S/P LHC: non obstructive CAD  -iv hydration  -plan of care discussed with patient  -post procedure instructions reviewed  -follow up with MD in 1-2 weeks  -Discussed therapeutic lifestyle changes to reduce risk factors such as following a cardiac diet, weight loss, maintaining a healthy weight, exercise, smoking cessation, medication compliance, and regular follow-up  with MD

## 2025-01-21 NOTE — BRIEF OPERATIVE NOTE - NSICDXBRIEFPOSTOP_GEN_ALL_CORE_FT
POST-OP DIAGNOSIS:  Nonobstructive atherosclerosis of coronary artery 21-Jan-2025 12:54:08  Chiquis Graham

## 2025-01-21 NOTE — PROGRESS NOTE ADULT - ASSESSMENT
53 y/o male came to  due to having SOB for the past week. Patient states that he doesn't feel too bad at rest, but when he exerts himself he becomes very SOB. Patient states that he does not F/U outpatient and/or take meds to help prevent his asthma/COPD exacerbations because he does not have insurance. Patient states that he feels better after the breathing treatments and steroids. pat seen for pulmonary eval. pat last night intubated for PEA, now on vent.    PROBLEMS;    Asthma/COPD Exacerbation  Cardiac arrest (PEA)  Acute encephalopathy   Acute resp failure   Likely alcohol withdrawal seizure   ACS/NSTEMI     PLAN;    pulmonary better.  S/p extubation  IV heparin   IV solu-medrols 40mg q12hr- continue for persistant bronchospasm  DuoNebs  Monitor off abx. NO SEPSIS   Sliding scale insulin as on steroid   DVT ppx with iv heparin

## 2025-01-21 NOTE — BRIEF OPERATIVE NOTE - NSICDXBRIEFPREOP_GEN_ALL_CORE_FT
PRE-OP DIAGNOSIS:  NSTEMI (non-ST elevation myocardial infarction) 21-Jan-2025 12:53:48  Chiquis Graham

## 2025-01-22 LAB
APTT BLD: 27.5 SEC — SIGNIFICANT CHANGE UP (ref 24.5–35.6)
HCT VFR BLD CALC: 37.7 % — LOW (ref 39–50)
HGB BLD-MCNC: 12.5 G/DL — LOW (ref 13–17)
MCHC RBC-ENTMCNC: 28.9 PG — SIGNIFICANT CHANGE UP (ref 27–34)
MCHC RBC-ENTMCNC: 33.2 G/DL — SIGNIFICANT CHANGE UP (ref 32–36)
MCV RBC AUTO: 87.1 FL — SIGNIFICANT CHANGE UP (ref 80–100)
PLATELET # BLD AUTO: 272 K/UL — SIGNIFICANT CHANGE UP (ref 150–400)
RBC # BLD: 4.33 M/UL — SIGNIFICANT CHANGE UP (ref 4.2–5.8)
RBC # FLD: 17.3 % — HIGH (ref 10.3–14.5)
TROPONIN I, HIGH SENSITIVITY RESULT: 510.92 NG/L — HIGH
TROPONIN I, HIGH SENSITIVITY RESULT: 748.21 NG/L — HIGH
WBC # BLD: 5.58 K/UL — SIGNIFICANT CHANGE UP (ref 3.8–10.5)
WBC # FLD AUTO: 5.58 K/UL — SIGNIFICANT CHANGE UP (ref 3.8–10.5)

## 2025-01-22 PROCEDURE — 93010 ELECTROCARDIOGRAM REPORT: CPT

## 2025-01-22 PROCEDURE — 99233 SBSQ HOSP IP/OBS HIGH 50: CPT

## 2025-01-22 RX ORDER — FLUTICASONE PROPIONATE AND SALMETEROL 113; 14 UG/1; UG/1
1 POWDER, METERED RESPIRATORY (INHALATION)
Refills: 0 | Status: DISCONTINUED | OUTPATIENT
Start: 2025-01-22 | End: 2025-01-23

## 2025-01-22 RX ADMIN — ATORVASTATIN CALCIUM 40 MILLIGRAM(S): 80 TABLET, FILM COATED ORAL at 21:49

## 2025-01-22 RX ADMIN — Medication 2 TABLET(S): at 21:49

## 2025-01-22 RX ADMIN — IPRATROPIUM BROMIDE AND ALBUTEROL SULFATE 3 MILLILITER(S): .5; 2.5 SOLUTION RESPIRATORY (INHALATION) at 01:45

## 2025-01-22 RX ADMIN — LIDOCAINE HYDROCHLORIDE 1 PATCH: 30 CREAM TOPICAL at 06:17

## 2025-01-22 RX ADMIN — FLUTICASONE PROPIONATE AND SALMETEROL 1 DOSE(S): 113; 14 POWDER, METERED RESPIRATORY (INHALATION) at 23:13

## 2025-01-22 RX ADMIN — ASPIRIN 81 MILLIGRAM(S): 81 TABLET, COATED ORAL at 09:47

## 2025-01-22 RX ADMIN — Medication 40 MILLIGRAM(S): at 17:25

## 2025-01-22 RX ADMIN — POLYETHYLENE GLYCOL 3350 17 GRAM(S): 17 POWDER, FOR SOLUTION ORAL at 21:49

## 2025-01-22 RX ADMIN — IPRATROPIUM BROMIDE AND ALBUTEROL SULFATE 3 MILLILITER(S): .5; 2.5 SOLUTION RESPIRATORY (INHALATION) at 22:59

## 2025-01-22 RX ADMIN — IPRATROPIUM BROMIDE AND ALBUTEROL SULFATE 3 MILLILITER(S): .5; 2.5 SOLUTION RESPIRATORY (INHALATION) at 08:46

## 2025-01-22 RX ADMIN — IPRATROPIUM BROMIDE AND ALBUTEROL SULFATE 3 MILLILITER(S): .5; 2.5 SOLUTION RESPIRATORY (INHALATION) at 13:28

## 2025-01-22 RX ADMIN — Medication 40 MILLIGRAM(S): at 06:21

## 2025-01-22 RX ADMIN — Medication 30 MILLILITER(S): at 09:47

## 2025-01-22 NOTE — PROGRESS NOTE ADULT - PROBLEM SELECTOR PROBLEM 2
Non-ST elevation MI (NSTEMI)

## 2025-01-22 NOTE — PROGRESS NOTE ADULT - PROBLEM SELECTOR PLAN 2
elevated troponin in above setting ,  ASA/ statin   - s/p LHC on 1/21 - non obstructive CAD, mild diffuse atherosclerosis LVEF 55%, LVEDP 16  - pt. with episode of chest pain overnight, now resolved, elevated 2nd trop - pt. had LHC on 1/21 - non obstructive

## 2025-01-22 NOTE — PROGRESS NOTE ADULT - ASSESSMENT
MEDICATIONS  (STANDING):  albuterol/ipratropium for Nebulization 3 milliLiter(s) Nebulizer every 6 hours  aspirin  chewable 81 milliGRAM(s) Oral daily  atorvastatin 40 milliGRAM(s) Oral at bedtime  chlorhexidine 2% Cloths 1 Application(s) Topical <User Schedule>  fluticasone propionate/ salmeterol 250-50 MICROgram(s) Diskus 1 Dose(s) Inhalation two times a day  influenza   Vaccine 0.5 milliLiter(s) IntraMuscular once  lidocaine   4% Patch 1 Patch Transdermal daily  magnesium hydroxide Suspension 30 milliLiter(s) Oral daily  methylPREDNISolone sodium succinate Injectable 40 milliGRAM(s) IV Push every 12 hours  polyethylene glycol 3350 17 Gram(s) Oral two times a day  senna 2 Tablet(s) Oral at bedtime  sodium chloride 0.9%. 1000 milliLiter(s) (135 mL/Hr) IV Continuous <Continuous>    MEDICATIONS  (PRN):  acetaminophen     Tablet .. 650 milliGRAM(s) Oral every 6 hours PRN Temp greater or equal to 38C (100.4F), Mild Pain (1 - 3)  albuterol    90 MICROgram(s) HFA Inhaler 2 Puff(s) Inhalation every 4 hours PRN Shortness of Breath and/or Wheezing  aluminum hydroxide/magnesium hydroxide/simethicone Suspension 30 milliLiter(s) Oral every 4 hours PRN Dyspepsia  ondansetron Injectable 4 milliGRAM(s) IV Push every 8 hours PRN Nausea and/or Vomiting      53 y/o male PMH asthma, traumatic SAH in 2019, ETOH abuse, recent hospitalization and ED visits for SOB, notes suggest regular ETOH use, presented for SOB    Admitted for asthma exacerbation     On 1/17, he had an episode where he clenched his chest followed by unresponsive, seizure like activity, found to be pulseless (ST/SVT on monitor), started CPR, ROSC obtained after 1 round of Epi and approx 3 mins CPR     Cause of cardiac arrest not clear, could be primary cardiac v neuro v resp     Subsequent CT head neg for injury  CT chest/abd/pelvis without critical findings (prelim)     EKG with ischemic changes     Intubated and on sedation to control seizure like activity       Problem list:     Cardiac arrest (PEA)  Acute encephalopathy   Acute resp failure   Likely alcohol withdrawal seizure   ACS/NSTEMI       Plan:     -neuro exam improved, now following commands, moves all extremities, EEG without epileptiform activity   -continue high dose thiamine and FA, will start CIWA protocol post-extubation   -continue aspirin, iv heparin, statin. HbA1C 5.7, check lipid profile, TTE with normal EF, RV fn. Repeat EKG with improved ischemic changes,   - Cath on 1/21  -BP and HR stable at this time, continue telemetry   - Solumedrol 20 q12h, continue DuoNebs   -BCx neg, WBC normal, no fever, no evidence of infection on CTs, will monitor off abx. NO SEPSIS   -s/p LHC ->Non-obstructive CAD  -monitor renal fn, trend and replete lytes prn  -DVT ppx with iv heparin     1/22: CP overnigght. Troponin normal->700s. Discussed with cardiology. Recent cath with non-obstructive CAD. No acute intervention. Chest pain appears MSK related. Discharge planning in am if troponin remain flat/downtrending.     I spent a total of 51  minutes in face-to-face time with the patient and on the floor managing patient including coordination of care. Overall 50% of the time spent in discussion of the diagnosis, counseling and treatment plan.

## 2025-01-22 NOTE — PROGRESS NOTE ADULT - PROBLEM SELECTOR PLAN 1
-unclear etiology  , possible cardiac , patient developed tachycardia initially , became pulse less  unresponsive ,  post event on monitor showed sinus tachycardia , brief afib , remain sinus rhythm , was hypertensive ,   -ischemic ST changes , elevated troponin peak 700s.    -ecotrin  statin , no BB as patient was admitted with exacerbation of COPD ,    -Normal ventricular function ,   -EKG showed hyperacute T with J point ST elevation similar to yesterday ekgs,  trending down troponin     no evidence of PE On CT angio ,    - s/p LHC on 1/21 - non obstructive CAD, mild diffuse atherosclerosis LVEF 55%, LVEDP 16    pt is stable from cardiac standpoint, followup op with Dr. Palla, will sign off

## 2025-01-22 NOTE — PROGRESS NOTE ADULT - SUBJECTIVE AND OBJECTIVE BOX
CC:    HPI:  53 y/o male came to  due to having SOB for the past week. Patient states that he doesn't feel too bad at rest, but when he exerts himself he becomes very SOB. Patient states that he does not F/U outpatient and/or take meds to help prevent his asthma/COPD exacerbations because he does not have insurance. Patient states that he feels better after the breathing treatments and steroids. Patient Patient had CP and S/P Cardiac arrest, NSTEMI, Now extubate    1/20: No events over night, for Cath on 1/21 1/22: CP overnigght. Troponin normal->700s. Discussed with cardiology. Recent cath with non-obstructive CAD. No acute intervention. Chest pain appears MSK related. Discharge planning in am if troponin remain flat/downtrending.         SOCIAL HX  former smoker  age [age]  daily alcohol in EMR  denied drugs    Meds: None     (16 Jan 2025 23:41)       PAST MEDICAL & SURGICAL HISTORY:  Asthma      ETOH abuse      No significant past surgical history          FAMILY HISTORY:      Social Hx:    Allergies    No Known Allergies    Intolerances                PHYSICAL EXAM:    T(C): 36.9 (01-22-25 @ 17:15), Max: 37.2 (01-22-25 @ 00:22)  HR: 85 (01-22-25 @ 17:15) (57 - 124)  BP: 126/78 (01-22-25 @ 17:15) (103/72 - 126/78)  RR: 18 (01-22-25 @ 17:15) (17 - 18)  SpO2: 98% (01-22-25 @ 17:15) (94% - 99%)    General: AAOx3; NAD  Head: AT/NC  ENT: Moist Mucous Membranes; No Injury  Eyes: EOMI; PERRL  Neck: Non-tender; No JVD  CVS: RRR, S1&S2, No murmur, No edema  Respiratory: Lungs CTA B/L; Normal Respiratory Effort; palpable chest tenderness L>R  Abdomen/GI: Soft, non-tender, non-distended, no guarding, no rebound, normal bowel sounds  : No bladder distention, No Ramos  Extremities: No cyanosis, No clubbing, No edema  MSK: No CVA tenderness, Normal ROM, No injury  Neuro: AAOx3, CNII-XII grossly intact, non-focal  Psych: Appropriate, Cooperative, No depression, No anxiety  Skin: Clean, Dry and Intact                          11.5   8.26  )-----------( 231      ( 20 Jan 2025 04:13 )             35.3       01-20    139  |  108  |  11  ----------------------------<  119[H]  3.8   |  27  |  0.60    Ca    8.3[L]      20 Jan 2025 04:13  Phos  4.2     01-20  Mg     2.7     01-20    TPro  6.5  /  Alb  3.2[L]  /  TBili  0.5  /  DBili  x   /  AST  66[H]  /  ALT  79[H]  /  AlkPhos  60  01-20                Urinalysis Basic - ( 20 Jan 2025 04:13 )    Color: x / Appearance: x / SG: x / pH: x  Gluc: 119 mg/dL / Ketone: x  / Bili: x / Urobili: x   Blood: x / Protein: x / Nitrite: x   Leuk Esterase: x / RBC: x / WBC x   Sq Epi: x / Non Sq Epi: x / Bacteria: x

## 2025-01-22 NOTE — PROGRESS NOTE ADULT - ASSESSMENT
55 y/o male came to  due to having SOB for the past week. Patient states that he doesn't feel too bad at rest, but when he exerts himself he becomes very SOB. Patient states that he does not F/U outpatient and/or take meds to help prevent his asthma/COPD exacerbations because he does not have insurance. Patient states that he feels better after the breathing treatments and steroids. pat seen for pulmonary eval. pat last night intubated for PEA, now on vent.    PROBLEMS;    Asthma/COPD Exacerbation  Cardiac arrest (PEA)  Acute encephalopathy   Acute resp failure   Likely alcohol withdrawal seizure   ACS/NSTEMI     PLAN;    pulmonary better.  IV solu-medrols 40mg q12hr- continue for persistant bronchospasm  DuoNebs  Monitor off abx. NO SEPSIS   Sliding scale insulin as on steroid   DVT ppx

## 2025-01-22 NOTE — PROGRESS NOTE ADULT - PROBLEM SELECTOR PLAN 3
as above  was comfortable prior to the event based on hospitalist note ,  IV steroids.

## 2025-01-22 NOTE — PROGRESS NOTE ADULT - SUBJECTIVE AND OBJECTIVE BOX
CHIEF COMPLAINT: shortness' of breath     HPI:  55 y/o male came to  due to having SOB for the past week. Patient states that he doesn't feel too bad at rest, but when he exerts himself he becomes very SOB. Patient states that he does not F/U outpatient and/or take meds to help prevent his asthma/COPD exacerbations because he does not have insurance. Patient states that he feels better after the breathing treatments and steroids. patient was feeling fine until yesterday evening ,( from ICU nurse/critical care attending ) patient was walking back to bed from rest room , was tachycardic at that time , patient apparently grabbed his chest , collapsed on to the bed ,patient did have seizure like activity , patient was intubated , was very hypertensive at that time , ekg showed  ST depression at that time , patient blood work showed elevated troponin , repeat ekg showed resolved ST   currently intubated sedated ,     No prior cardiac hx on chart review ,    the sequence of events not clear      1/19/25 interpretor    #[id number],Patient extubated feeling , little sore throat from intubation , patient says he felt dizziness before he fell on to bed , can not give more details  denies any chest pain , does have mild sob ,  1/20/'25: no complaints  1/21/25:Thai speaking, NP fluent s/p LHC - non obstructive CAD  1/22/25: had chest pain overnight, now no complaints     INPATIENT  MEDICATIONS  (STANDING):  albuterol/ipratropium for Nebulization 3 milliLiter(s) Nebulizer every 6 hours  aspirin  chewable 81 milliGRAM(s) Oral daily  atorvastatin 40 milliGRAM(s) Oral at bedtime  chlorhexidine 2% Cloths 1 Application(s) Topical <User Schedule>  influenza   Vaccine 0.5 milliLiter(s) IntraMuscular once  lidocaine   4% Patch 1 Patch Transdermal daily  magnesium hydroxide Suspension 30 milliLiter(s) Oral daily  methylPREDNISolone sodium succinate Injectable 40 milliGRAM(s) IV Push every 12 hours  polyethylene glycol 3350 17 Gram(s) Oral two times a day  senna 2 Tablet(s) Oral at bedtime  sodium chloride 0.9%. 1000 milliLiter(s) (135 mL/Hr) IV Continuous <Continuous>    MEDICATIONS  (PRN):  acetaminophen     Tablet .. 650 milliGRAM(s) Oral every 6 hours PRN Temp greater or equal to 38C (100.4F), Mild Pain (1 - 3)  albuterol    90 MICROgram(s) HFA Inhaler 2 Puff(s) Inhalation every 4 hours PRN Shortness of Breath and/or Wheezing  aluminum hydroxide/magnesium hydroxide/simethicone Suspension 30 milliLiter(s) Oral every 4 hours PRN Dyspepsia  heparin   Injectable 2900 Unit(s) IV Push every 6 hours PRN For aPTT less than 40  LORazepam   Injectable 2 milliGRAM(s) IV Push every 3 hours PRN CIWA-Ar score 8 or greater  ondansetron Injectable 4 milliGRAM(s) IV Push every 8 hours PRN Nausea and/or Vomiting    Vital Signs Last 24 Hrs  T(C): 37 (22 Jan 2025 08:00), Max: 37.2 (22 Jan 2025 00:22)  T(F): 98.6 (22 Jan 2025 08:00), Max: 98.9 (22 Jan 2025 00:22)  HR: 66 (22 Jan 2025 08:00) (57 - 83)  BP: 114/78 (22 Jan 2025 08:00) (103/72 - 128/87)  BP(mean): --  RR: 18 (22 Jan 2025 08:00) (17 - 18)  SpO2: 97% (22 Jan 2025 08:00) (94% - 99%)    Parameters below as of 22 Jan 2025 08:00  Patient On (Oxygen Delivery Method): room air        PHYSICAL EXAM:    Constitutional: NAD, awake and alert, well-developed  Neck:  supple,  No JVD  Respiratory: Breath sounds are mild decreased mild expiratory wheezing, no rales or rhonchi  Cardiovascular: S1 and S2, regular rate and rhythm, no Murmurs, gallops or rubs  Gastrointestinal: Bowel Sounds present, soft, nontender.   Extremities: No peripheral edema. No clubbing or cyanosis.  Vascular: 2+ peripheral pulses  Neurological: intubated   Musculoskeletal: no calf tenderness.  Skin: No rashes.        ===============================  ===============================  LABS: reviewed                                   12.5   5.58  )-----------( 272      ( 22 Jan 2025 06:49 )             37.7     01-21    136  |  106  |  22  ----------------------------<  119[H]  4.5   |  26  |  0.76    Ca    8.5      21 Jan 2025 21:30  Phos  4.1     01-21  Mg     2.3     01-21      - TroponinI hsT: <-748.21, <-53.12, <-126.98, <-431.64, <-768.03    sinus tachycardia immediately after intubate , sinus tachycardia ST depression inferior leads  ( compared to prior EKG )  1/18/24 sinus rhythm      Snus rhythm  J point ST elevation Inferolateral leads  mild T inversion AVL       Strips reviewed , patient did have   tachycardiac  , brief Atrial afib on 1/17/24   on monitor strip review , transient ST elevated noted on monitor strip    < from: TTE W or WO Ultrasound Enhancing Agent (01.18.25 @ 11:13) >      1. Left ventricular systolic function is normal with an ejection fraction estimated at 55 to 60 %.   2. Normal right ventricular systolic function.  no prericardial effusion   < end of copied text >    < from: CT Angio Chest PE Protocol w/ IV Cont (01.18.25 @ 10:25) >      IMPRESSION:  No evidence of pulmonary embolism.    < end of copied text >

## 2025-01-22 NOTE — CHART NOTE - NSCHARTNOTEFT_GEN_A_CORE
MEDICINE NP    Notified by RN patient with chest pain. Seen and examined patient at bedside. Patient is Irish speaking and alert, NAD. Denies HA, SOB, cough, N/V, or abd pain. Patient with chest pain on inhalation and pain reproducible. Patient status post cardiac arrest pain is associated high quality CPR. patient treated with lidocaine patch and IV tylenol. Cards is following.    VITAL SIGNS:  T(C): 37.2 (01-22-25 @ 00:22), Max: 37.2 (01-22-25 @ 00:22)  HR: 67 (01-22-25 @ 02:56) (57 - 83)  BP: 103/72 (01-22-25 @ 00:22) (103/72 - 146/95)  RR: 17 (01-22-25 @ 00:22) (16 - 18)  SpO2: 97% (01-22-25 @ 02:56) (94% - 99%)  Wt(kg): --      LABORATORY:                          13.0   6.70  )-----------( 264      ( 21 Jan 2025 21:30 )             39.4       01-21    136  |  106  |  22  ----------------------------<  119[H]  4.5   |  26  |  0.76    Ca    8.5      21 Jan 2025 21:30  Phos  4.1     01-21  Mg     2.3     01-21    TPro  6.5  /  Alb  3.2[L]  /  TBili  0.5  /  DBili  x   /  AST  66[H]  /  ALT  79[H]  /  AlkPhos  60  01-20          MICROBIOLOGY:           RADIOLOGY:          PHYSICAL EXAM:    Constitutional: AOx3. NAD.    Respiratory: clear lungs bilaterally. No wheezing, rhonchi, or crackles.    Cardiovascular: S1 S2. No murmurs.    Gastrointestinal: BS X4 active. soft. nontender.    Extremities/Vascular: +2 pulses bilaterally. No BLE edema.      ASSESSMENT/PLAN:   HPI:  55 y/o male came to  due to having SOB for the past week. Patient states that he doesn't feel too bad at rest, but when he exerts himself he becomes very SOB. Patient states that he does not F/U outpatient and/or take meds to help prevent his asthma/COPD exacerbations because he does not have insurance. Patient states that he feels better after the breathing treatments and steroids. Patient complaint of chest pain.          1) Chest Pain  -IV tylenol   -Lidocaine patch to sternum  -Trop sent resulted negative  -Ekg same as 1p with abnormal st segments  -c/w TELE  -F/U primary team in AM

## 2025-01-22 NOTE — PROGRESS NOTE ADULT - SUBJECTIVE AND OBJECTIVE BOX
Subjective:    pat better, less wheezing, sitting in bed.    MEDICATIONS  (STANDING):  albuterol/ipratropium for Nebulization 3 milliLiter(s) Nebulizer every 6 hours  aspirin  chewable 81 milliGRAM(s) Oral daily  atorvastatin 40 milliGRAM(s) Oral at bedtime  chlorhexidine 2% Cloths 1 Application(s) Topical <User Schedule>  influenza   Vaccine 0.5 milliLiter(s) IntraMuscular once  lidocaine   4% Patch 1 Patch Transdermal daily  magnesium hydroxide Suspension 30 milliLiter(s) Oral daily  methylPREDNISolone sodium succinate Injectable 40 milliGRAM(s) IV Push every 12 hours  polyethylene glycol 3350 17 Gram(s) Oral two times a day  senna 2 Tablet(s) Oral at bedtime  sodium chloride 0.9%. 1000 milliLiter(s) (135 mL/Hr) IV Continuous <Continuous>    MEDICATIONS  (PRN):  acetaminophen     Tablet .. 650 milliGRAM(s) Oral every 6 hours PRN Temp greater or equal to 38C (100.4F), Mild Pain (1 - 3)  albuterol    90 MICROgram(s) HFA Inhaler 2 Puff(s) Inhalation every 4 hours PRN Shortness of Breath and/or Wheezing  aluminum hydroxide/magnesium hydroxide/simethicone Suspension 30 milliLiter(s) Oral every 4 hours PRN Dyspepsia  ondansetron Injectable 4 milliGRAM(s) IV Push every 8 hours PRN Nausea and/or Vomiting      Allergies    No Known Allergies    Intolerances        Vital Signs Last 24 Hrs  T(C): 36.9 (22 Jan 2025 17:15), Max: 37.2 (22 Jan 2025 00:22)  T(F): 98.4 (22 Jan 2025 17:15), Max: 98.9 (22 Jan 2025 00:22)  HR: 85 (22 Jan 2025 17:15) (57 - 124)  BP: 126/78 (22 Jan 2025 17:15) (103/72 - 126/78)  BP(mean): --  RR: 18 (22 Jan 2025 17:15) (17 - 18)  SpO2: 98% (22 Jan 2025 17:15) (94% - 99%)    Parameters below as of 22 Jan 2025 17:15  Patient On (Oxygen Delivery Method): room air          PHYSICAL EXAMINATION:    NECK:  Supple. No lymphadenopathy. Jugular venous pressure not elevated. Carotids equal.   HEART:   The cardiac impulse has a normal quality. Reg., Nl S1 and S2.  There are no murmurs, rubs or gallops noted  CHEST:  Chest rhonchi to auscultation. Normal respiratory effort.  ABDOMEN:  Soft and nontender.   EXTREMITIES:  There is no edema.       LABS:                        12.5   5.58  )-----------( 272      ( 22 Jan 2025 06:49 )             37.7     01-21    136  |  106  |  22  ----------------------------<  119[H]  4.5   |  26  |  0.76    Ca    8.5      21 Jan 2025 21:30  Phos  4.1     01-21  Mg     2.3     01-21      PTT - ( 22 Jan 2025 06:49 )  PTT:27.5 sec  Urinalysis Basic - ( 21 Jan 2025 21:30 )    Color: x / Appearance: x / SG: x / pH: x  Gluc: 119 mg/dL / Ketone: x  / Bili: x / Urobili: x   Blood: x / Protein: x / Nitrite: x   Leuk Esterase: x / RBC: x / WBC x   Sq Epi: x / Non Sq Epi: x / Bacteria: x

## 2025-01-22 NOTE — PROGRESS NOTE ADULT - NUTRITIONAL ASSESSMENT
This patient has been assessed with a concern for Malnutrition and has been determined to have a diagnosis/diagnoses of Severe protein-calorie malnutrition and Underweight (BMI < 19).    This patient is being managed with:   Diet DASH/TLC-  Sodium & Cholesterol Restricted     Special Instructions for Nursing:  Sodium & Cholesterol Restricted  Entered: Jan 21 2025 12:32PM  
This patient has been assessed with a concern for Malnutrition and has been determined to have a diagnosis/diagnoses of Severe protein-calorie malnutrition and Underweight (BMI < 19).    This patient is being managed with:   Diet Regular-  Entered: Jan 16 2025 11:40PM  
This patient has been assessed with a concern for Malnutrition and has been determined to have a diagnosis/diagnoses of Severe protein-calorie malnutrition and Underweight (BMI < 19).    This patient is being managed with:   Diet Regular-  Entered: Jan 16 2025 11:40PM  
This patient has been assessed with a concern for Malnutrition and has been determined to have a diagnosis/diagnoses of Severe protein-calorie malnutrition and Underweight (BMI < 19).    This patient is being managed with:   Diet Regular-  Entered: Jan 19 2025  3:44PM  
This patient has been assessed with a concern for Malnutrition and has been determined to have a diagnosis/diagnoses of Severe protein-calorie malnutrition and Underweight (BMI < 19).    This patient is being managed with:   Diet Regular-  Entered: Jan 19 2025  3:44PM  
This patient has been assessed with a concern for Malnutrition and has been determined to have a diagnosis/diagnoses of Severe protein-calorie malnutrition and Underweight (BMI < 19).    This patient is being managed with:   Diet DASH/TLC-  Sodium & Cholesterol Restricted     Special Instructions for Nursing:  Sodium & Cholesterol Restricted  Entered: Jan 21 2025 12:32PM  
This patient has been assessed with a concern for Malnutrition and has been determined to have a diagnosis/diagnoses of Severe protein-calorie malnutrition and Underweight (BMI < 19).    This patient is being managed with:   Diet NPO-  Entered: Jan 19 2025  9:32AM  
This patient has been assessed with a concern for Malnutrition and has been determined to have a diagnosis/diagnoses of Severe protein-calorie malnutrition and Underweight (BMI < 19).    This patient is being managed with:   Diet Regular-  Entered: Jan 19 2025  3:44PM  
This patient has been assessed with a concern for Malnutrition and has been determined to have a diagnosis/diagnoses of Severe protein-calorie malnutrition and Underweight (BMI < 19).    This patient is being managed with:   Diet NPO-  Entered: Jan 19 2025  9:32AM  
This patient has been assessed with a concern for Malnutrition and has been determined to have a diagnosis/diagnoses of Severe protein-calorie malnutrition and Underweight (BMI < 19).    This patient is being managed with:   Diet DASH/TLC-  Sodium & Cholesterol Restricted     Special Instructions for Nursing:  Sodium & Cholesterol Restricted  Entered: Jan 21 2025 12:32PM

## 2025-01-23 VITALS — OXYGEN SATURATION: 97 %

## 2025-01-23 LAB
HCT VFR BLD CALC: 41.3 % — SIGNIFICANT CHANGE UP (ref 39–50)
HGB BLD-MCNC: 13.5 G/DL — SIGNIFICANT CHANGE UP (ref 13–17)
MCHC RBC-ENTMCNC: 28.7 PG — SIGNIFICANT CHANGE UP (ref 27–34)
MCHC RBC-ENTMCNC: 32.7 G/DL — SIGNIFICANT CHANGE UP (ref 32–36)
MCV RBC AUTO: 87.9 FL — SIGNIFICANT CHANGE UP (ref 80–100)
PLATELET # BLD AUTO: 287 K/UL — SIGNIFICANT CHANGE UP (ref 150–400)
RBC # BLD: 4.7 M/UL — SIGNIFICANT CHANGE UP (ref 4.2–5.8)
RBC # FLD: 17.5 % — HIGH (ref 10.3–14.5)
TROPONIN I, HIGH SENSITIVITY RESULT: 460.7 NG/L — HIGH
WBC # BLD: 6.54 K/UL — SIGNIFICANT CHANGE UP (ref 3.8–10.5)
WBC # FLD AUTO: 6.54 K/UL — SIGNIFICANT CHANGE UP (ref 3.8–10.5)

## 2025-01-23 PROCEDURE — 99239 HOSP IP/OBS DSCHRG MGMT >30: CPT

## 2025-01-23 RX ORDER — ATORVASTATIN CALCIUM 80 MG/1
1 TABLET, FILM COATED ORAL
Qty: 30 | Refills: 0
Start: 2025-01-23 | End: 2025-02-21

## 2025-01-23 RX ORDER — ASPIRIN 81 MG/1
1 TABLET, COATED ORAL
Qty: 30 | Refills: 0
Start: 2025-01-23 | End: 2025-02-21

## 2025-01-23 RX ORDER — FLUTICASONE PROPIONATE AND SALMETEROL 113; 14 UG/1; UG/1
1 POWDER, METERED RESPIRATORY (INHALATION)
Qty: 1 | Refills: 0
Start: 2025-01-23

## 2025-01-23 RX ORDER — ALBUTEROL 90 MCG
2 AEROSOL REFILL (GRAM) INHALATION
Qty: 1 | Refills: 0
Start: 2025-01-23

## 2025-01-23 RX ORDER — PREDNISONE 5 MG/1
1 TABLET ORAL
Qty: 20 | Refills: 0
Start: 2025-01-23

## 2025-01-23 RX ADMIN — LIDOCAINE HYDROCHLORIDE 1 PATCH: 30 CREAM TOPICAL at 08:54

## 2025-01-23 RX ADMIN — FLUTICASONE PROPIONATE AND SALMETEROL 1 DOSE(S): 113; 14 POWDER, METERED RESPIRATORY (INHALATION) at 10:27

## 2025-01-23 RX ADMIN — ASPIRIN 81 MILLIGRAM(S): 81 TABLET, COATED ORAL at 08:54

## 2025-01-23 RX ADMIN — IPRATROPIUM BROMIDE AND ALBUTEROL SULFATE 3 MILLILITER(S): .5; 2.5 SOLUTION RESPIRATORY (INHALATION) at 08:06

## 2025-01-23 RX ADMIN — IPRATROPIUM BROMIDE AND ALBUTEROL SULFATE 3 MILLILITER(S): .5; 2.5 SOLUTION RESPIRATORY (INHALATION) at 02:35

## 2025-01-23 RX ADMIN — Medication 30 MILLILITER(S): at 08:55

## 2025-01-23 RX ADMIN — Medication 40 MILLIGRAM(S): at 06:24

## 2025-01-23 NOTE — DISCHARGE NOTE PROVIDER - PROVIDER TOKENS
PROVIDER:[TOKEN:[[id number]],FOLLOWUP:[1 week],ESTABLISHEDPATIENT:[T]],PROVIDER:[TOKEN:[[id number]],FOLLOWUP:[2 weeks],ESTABLISHEDPATIENT:[T]],PROVIDER:[TOKEN:[[id number]],FOLLOWUP:[Routine],ESTABLISHEDPATIENT:[T]]

## 2025-01-23 NOTE — DISCHARGE NOTE PROVIDER - NSDCCPCAREPLAN_GEN_ALL_CORE_FT
PRINCIPAL DISCHARGE DIAGNOSIS  Diagnosis: Status asthmaticus  Assessment and Plan of Treatment: Asthma exacerbation. Avoid inhalation of foreign substances and/or any things that provoke allergy symptoms.  Take steroid prescription as prescribed  Follow up with your primary medical provider and/or pulmonologist  Take inhalers as prescribed      SECONDARY DISCHARGE DIAGNOSES  Diagnosis: Cardiopulmonary arrest  Assessment and Plan of Treatment: You had a cardiac arrest that was possibly provoked by the asthma exacerbation. This has stabalized. You underwent a procedure (left heart catheterization) that didn't reveal any significant blockages in the heart. Take aspirin and cholesterol medicaiton (atorvastatin) daily and follow up with your primary medical provider and cardiologist.   Because you underwent chest compressions to revive your heart, it is normal to have musculoskeletal chest pain for several weeks but this should improve with time.    Diagnosis: Prediabetes  Assessment and Plan of Treatment: The blood test we use to check for diabetes is called A1c. Value equal to or greater than 6.5% gives the diagnosis of diabetes. Value at and between 5.7% and 6.4% is considered to be prediabetes. Your blood test resulted with 5.7% indicating that you may be pre-diabetic.  Continue current medications as instructed/prescribed and maintain low carbohydrate diet and follow up with your primary medical doctor.

## 2025-01-23 NOTE — DISCHARGE NOTE PROVIDER - CARE PROVIDERS DIRECT ADDRESSES
,DirectAddress_Unknown,[email or web address],DirectAddress_Unknown

## 2025-01-23 NOTE — DISCHARGE NOTE PROVIDER - NSDCCAREPROVSEEN_GEN_ALL_CORE_FT
Gilbert, Dudley Hurt, Patrice Hanies, Ashley De La Torre, Brandy Mclaughlin, Cristofer Olivera, Jayden Babcock, Jeffy Golden, Reji Tomas, Alfredo Vinson, Mauricio Emerson, Kita Greene, Cristian Arshad, Romero Graham, Chiquis Caldera, Guy Gorman, George Palla, Brant Saldivar, Alfredo Lacey, Christopher Shah, Lisa

## 2025-01-23 NOTE — DISCHARGE NOTE PROVIDER - CARE PROVIDER_API CALL
Isela Levy.  Family Medicine  [address] [place] [address]  Phone: [phone]  Fax: [phone]  Established Patient  Follow Up Time: 1 week    Palla, Venugopal Reddy  Cardiovascular Disease  [address], [address]  [place] [address]  Phone: [phone]  Fax: [phone]  Established Patient  Follow Up Time: 2 weeks    Patrice Hurt  Pulmonary Disease  [address] [place] [address]  Phone: [phone]  Fax: [phone]  Established Patient  Follow Up Time: Routine

## 2025-01-23 NOTE — PROGRESS NOTE ADULT - SUBJECTIVE AND OBJECTIVE BOX
Subjective:    pat better, still some wheez, lying in bed.    MEDICATIONS  (STANDING):  albuterol/ipratropium for Nebulization 3 milliLiter(s) Nebulizer every 6 hours  aspirin  chewable 81 milliGRAM(s) Oral daily  atorvastatin 40 milliGRAM(s) Oral at bedtime  chlorhexidine 2% Cloths 1 Application(s) Topical <User Schedule>  fluticasone propionate/ salmeterol 250-50 MICROgram(s) Diskus 1 Dose(s) Inhalation two times a day  influenza   Vaccine 0.5 milliLiter(s) IntraMuscular once  lidocaine   4% Patch 1 Patch Transdermal daily  magnesium hydroxide Suspension 30 milliLiter(s) Oral daily  methylPREDNISolone sodium succinate Injectable 40 milliGRAM(s) IV Push every 12 hours  polyethylene glycol 3350 17 Gram(s) Oral two times a day  senna 2 Tablet(s) Oral at bedtime  sodium chloride 0.9%. 1000 milliLiter(s) (135 mL/Hr) IV Continuous <Continuous>    MEDICATIONS  (PRN):  acetaminophen     Tablet .. 650 milliGRAM(s) Oral every 6 hours PRN Temp greater or equal to 38C (100.4F), Mild Pain (1 - 3)  albuterol    90 MICROgram(s) HFA Inhaler 2 Puff(s) Inhalation every 4 hours PRN Shortness of Breath and/or Wheezing  aluminum hydroxide/magnesium hydroxide/simethicone Suspension 30 milliLiter(s) Oral every 4 hours PRN Dyspepsia  ondansetron Injectable 4 milliGRAM(s) IV Push every 8 hours PRN Nausea and/or Vomiting      Allergies    No Known Allergies    Intolerances        Vital Signs Last 24 Hrs  T(C): 37.1 (23 Jan 2025 08:00), Max: 37.1 (23 Jan 2025 08:00)  T(F): 98.7 (23 Jan 2025 08:00), Max: 98.7 (23 Jan 2025 08:00)  HR: 77 (23 Jan 2025 08:07) (77 - 87)  BP: 103/63 (23 Jan 2025 08:00) (103/63 - 126/78)  BP(mean): --  RR: 18 (23 Jan 2025 08:00) (16 - 18)  SpO2: 97% (23 Jan 2025 08:07) (96% - 98%)    Parameters below as of 23 Jan 2025 08:07  Patient On (Oxygen Delivery Method): room air          PHYSICAL EXAMINATION:    NECK:  Supple. No lymphadenopathy. Jugular venous pressure not elevated. Carotids equal.   HEART:   The cardiac impulse has a normal quality. Reg., Nl S1 and S2.  There are no murmurs, rubs or gallops noted  CHEST:  Chest rhonchi to auscultation. Normal respiratory effort.  ABDOMEN:  Soft and nontender.   EXTREMITIES:  There is no edema.       LABS:                        13.5   6.54  )-----------( 287      ( 23 Jan 2025 07:45 )             41.3     01-21    136  |  106  |  22  ----------------------------<  119[H]  4.5   |  26  |  0.76    Ca    8.5      21 Jan 2025 21:30  Phos  4.1     01-21  Mg     2.3     01-21      PTT - ( 22 Jan 2025 06:49 )  PTT:27.5 sec  Urinalysis Basic - ( 21 Jan 2025 21:30 )    Color: x / Appearance: x / SG: x / pH: x  Gluc: 119 mg/dL / Ketone: x  / Bili: x / Urobili: x   Blood: x / Protein: x / Nitrite: x   Leuk Esterase: x / RBC: x / WBC x   Sq Epi: x / Non Sq Epi: x / Bacteria: x

## 2025-01-23 NOTE — DISCHARGE NOTE PROVIDER - DETAILS OF MALNUTRITION DIAGNOSIS/DIAGNOSES
This patient has been assessed with a concern for Malnutrition and was treated during this hospitalization for the following Nutrition diagnosis/diagnoses:     -  01/17/2025: Severe protein-calorie malnutrition   -  01/17/2025: Underweight (BMI < 19)

## 2025-01-23 NOTE — DISCHARGE NOTE PROVIDER - NSDCMRMEDTOKEN_GEN_ALL_CORE_FT
Advair Diskus 250 mcg-50 mcg inhalation powder: 1 puff(s) inhaled 2 times a day  Albuterol (Eqv-ProAir HFA) 90 mcg/inh inhalation aerosol: 2 puff(s) inhaled every 6 hours as needed for  shortness of breath and/or wheezing  aspirin 81 mg oral tablet, chewable: 1 tab(s) orally once a day  atorvastatin 40 mg oral tablet: 1 tab(s) orally once a day (at bedtime)  predniSONE 10 mg oral tablet: 1 tab(s) orally once a day as directed: 40mg (4 tabs) qd Days 1-2, 30mg (3 tabs) qd Days 3-4, 20mg (2 tabs) qd Days 5-6, 10mg (1 tab) qd Days 7-8

## 2025-01-23 NOTE — DISCHARGE NOTE NURSING/CASE MANAGEMENT/SOCIAL WORK - PATIENT PORTAL LINK FT
You can access the FollowMyHealth Patient Portal offered by [facility] by registering at the following website: [email or web address]. By joining [employer]’s FollowMyHealth portal, you will also be able to view your health information using other applications (apps) compatible with our system.

## 2025-01-23 NOTE — DISCHARGE NOTE NURSING/CASE MANAGEMENT/SOCIAL WORK - FINANCIAL ASSISTANCE
[facility] provides services at a reduced cost to those who are determined to be eligible through [facility]’s financial assistance program. Information regarding [facility]’s financial assistance program can be found by going to [email or web address] or by calling [phone].

## 2025-01-23 NOTE — PROGRESS NOTE ADULT - ASSESSMENT
55 y/o male came to  due to having SOB for the past week. Patient states that he doesn't feel too bad at rest, but when he exerts himself he becomes very SOB. Patient states that he does not F/U outpatient and/or take meds to help prevent his asthma/COPD exacerbations because he does not have insurance. Patient states that he feels better after the breathing treatments and steroids. pat seen for pulmonary eval. pat last night intubated for PEA, now on vent.    PROBLEMS;    Asthma/COPD Exacerbation  Cardiac arrest (PEA)  Acute encephalopathy   Acute resp failure   Likely alcohol withdrawal seizure   ACS/NSTEMI     PLAN;    Pulmonary better. discharge planning may need home neb machine  IV solu-medrols 40mg q12hr  DuoNebs/ advair  Monitor off abx. NO SEPSIS   Sliding scale insulin as on steroid   DVT ppx

## 2025-01-30 DIAGNOSIS — E87.6 HYPOKALEMIA: ICD-10-CM

## 2025-01-30 DIAGNOSIS — G93.40 ENCEPHALOPATHY, UNSPECIFIED: ICD-10-CM

## 2025-01-30 DIAGNOSIS — J44.1 CHRONIC OBSTRUCTIVE PULMONARY DISEASE WITH (ACUTE) EXACERBATION: ICD-10-CM

## 2025-01-30 DIAGNOSIS — E43 UNSPECIFIED SEVERE PROTEIN-CALORIE MALNUTRITION: ICD-10-CM

## 2025-01-30 DIAGNOSIS — Z11.52 ENCOUNTER FOR SCREENING FOR COVID-19: ICD-10-CM

## 2025-01-30 DIAGNOSIS — E86.0 DEHYDRATION: ICD-10-CM

## 2025-01-30 DIAGNOSIS — J45.902 UNSPECIFIED ASTHMA WITH STATUS ASTHMATICUS: ICD-10-CM

## 2025-01-30 DIAGNOSIS — J96.01 ACUTE RESPIRATORY FAILURE WITH HYPOXIA: ICD-10-CM

## 2025-01-30 DIAGNOSIS — Z87.891 PERSONAL HISTORY OF NICOTINE DEPENDENCE: ICD-10-CM

## 2025-01-30 DIAGNOSIS — Z87.820 PERSONAL HISTORY OF TRAUMATIC BRAIN INJURY: ICD-10-CM

## 2025-01-30 DIAGNOSIS — R73.03 PREDIABETES: ICD-10-CM

## 2025-01-30 DIAGNOSIS — I25.10 ATHEROSCLEROTIC HEART DISEASE OF NATIVE CORONARY ARTERY WITHOUT ANGINA PECTORIS: ICD-10-CM

## 2025-01-30 DIAGNOSIS — Z59.71 INSUFFICIENT HEALTH INSURANCE COVERAGE: ICD-10-CM

## 2025-01-30 DIAGNOSIS — G47.00 INSOMNIA, UNSPECIFIED: ICD-10-CM

## 2025-01-30 DIAGNOSIS — I21.4 NON-ST ELEVATION (NSTEMI) MYOCARDIAL INFARCTION: ICD-10-CM

## 2025-01-30 DIAGNOSIS — R56.9 UNSPECIFIED CONVULSIONS: ICD-10-CM

## 2025-01-30 DIAGNOSIS — I46.8 CARDIAC ARREST DUE TO OTHER UNDERLYING CONDITION: ICD-10-CM

## 2025-01-30 DIAGNOSIS — F10.139 ALCOHOL ABUSE WITH WITHDRAWAL, UNSPECIFIED: ICD-10-CM

## 2025-03-07 ENCOUNTER — EMERGENCY (EMERGENCY)
Facility: HOSPITAL | Age: 55
LOS: 0 days | Discharge: HOME | End: 2025-03-08
Attending: EMERGENCY MEDICINE
Payer: MEDICAID

## 2025-03-07 VITALS — HEIGHT: 64 IN | WEIGHT: 130.07 LBS

## 2025-03-07 DIAGNOSIS — R06.02 SHORTNESS OF BREATH: ICD-10-CM

## 2025-03-07 DIAGNOSIS — T48.6X6A UNDERDOSING OF ANTIASTHMATICS, INITIAL ENCOUNTER: ICD-10-CM

## 2025-03-07 DIAGNOSIS — R05.1 ACUTE COUGH: ICD-10-CM

## 2025-03-07 DIAGNOSIS — J45.901 UNSPECIFIED ASTHMA WITH (ACUTE) EXACERBATION: ICD-10-CM

## 2025-03-07 PROCEDURE — 99284 EMERGENCY DEPT VISIT MOD MDM: CPT | Mod: 25

## 2025-03-07 PROCEDURE — 71045 X-RAY EXAM CHEST 1 VIEW: CPT

## 2025-03-07 PROCEDURE — 93005 ELECTROCARDIOGRAM TRACING: CPT

## 2025-03-07 PROCEDURE — 93010 ELECTROCARDIOGRAM REPORT: CPT

## 2025-03-07 PROCEDURE — 94640 AIRWAY INHALATION TREATMENT: CPT

## 2025-03-07 PROCEDURE — 96374 THER/PROPH/DIAG INJ IV PUSH: CPT

## 2025-03-07 PROCEDURE — 99284 EMERGENCY DEPT VISIT MOD MDM: CPT

## 2025-03-07 NOTE — ED ADULT TRIAGE NOTE - CHIEF COMPLAINT QUOTE
Pt ambulatory to ED w c/o difficulty breathing x1 week. states he ran out of medication breathing has worsened. endorsing mild chest pain, abdominal pain and chills. denies fever, and sick contacts. pmh of asthma. respirations even and unlabored, speaking in complete sentences, O2 sat 97% on RA. sent for ekg

## 2025-03-08 VITALS
TEMPERATURE: 98 F | HEART RATE: 88 BPM | DIASTOLIC BLOOD PRESSURE: 75 MMHG | RESPIRATION RATE: 18 BRPM | OXYGEN SATURATION: 94 % | SYSTOLIC BLOOD PRESSURE: 108 MMHG

## 2025-03-08 PROCEDURE — 71045 X-RAY EXAM CHEST 1 VIEW: CPT | Mod: 26

## 2025-03-08 RX ORDER — METHYLPREDNISOLONE ACETATE 80 MG/ML
125 INJECTION, SUSPENSION INTRA-ARTICULAR; INTRALESIONAL; INTRAMUSCULAR; SOFT TISSUE ONCE
Refills: 0 | Status: COMPLETED | OUTPATIENT
Start: 2025-03-08 | End: 2025-03-08

## 2025-03-08 RX ORDER — PREDNISONE 20 MG/1
1 TABLET ORAL
Qty: 1 | Refills: 0
Start: 2025-03-08 | End: 2025-03-11

## 2025-03-08 RX ORDER — ALBUTEROL SULFATE 2.5 MG/3ML
2 VIAL, NEBULIZER (ML) INHALATION ONCE
Refills: 0 | Status: COMPLETED | OUTPATIENT
Start: 2025-03-08 | End: 2025-03-08

## 2025-03-08 RX ADMIN — METHYLPREDNISOLONE ACETATE 125 MILLIGRAM(S): 80 INJECTION, SUSPENSION INTRA-ARTICULAR; INTRALESIONAL; INTRAMUSCULAR; SOFT TISSUE at 01:17

## 2025-03-08 RX ADMIN — Medication 2 PUFF(S): at 01:20

## 2025-03-08 NOTE — ED PROVIDER NOTE - PATIENT PORTAL LINK FT
You can access the FollowMyHealth Patient Portal offered by [facility] by registering at the following website: [email or web address]. By joining [employer]’s FollowMyHealth portal, you will also be able to view your health information using other applications (apps) compatible with our system.

## 2025-03-08 NOTE — ED ADULT NURSE NOTE - CAS TRG GEN SKIN COLOR

## 2025-03-08 NOTE — ED ADULT NURSE NOTE - NS ED NURSE LEVEL OF CONSCIOUSNESS MENTAL STATUS
Continued Stay Note  HEATH Acevedo     Patient Name: Vidhi Lopez  MRN: [id number]  Today's Date: 11/9/2018    Admit Date: 11/5/2018          Discharge Plan     Row Name 11/09/18 0903       Plan    Plan Home with HH    Patient/Family in Agreement with Plan yes    Plan Comments Received call back from Aguila with [facility]  Referral received and they accept.  They will plan to see pt on the weekend.    Received notification that pt's son is going to take pt to an assisted living facility in [place] to be close to him.  Called [facility] ; spoke with Aguila.  Update given that pt had a change in DCP.                Discharge Codes    No documentation.       Expected Discharge Date and Time     Expected Discharge Date Expected Discharge Time    Nov 8, 2018             Gricelda Chirinos     Awake/Alert/Cooperative

## 2025-03-08 NOTE — ED PROVIDER NOTE - CLINICAL SUMMARY MEDICAL DECISION MAKING FREE TEXT BOX
Patient with shortness of breath in the setting of asthma exacerbation will give albuterol and steroids reassess.  Progress note 2:02 AM symptoms improved will discharge with prednisone and albuterol as well as steroid inhaler.

## 2025-03-08 NOTE — ED ADULT NURSE NOTE - CAS DISCH CONDITION
08/29/19 1200   OTHER   Transmission Date 08/29/19   Transmission Type  Maintenance   PAS 47   SUSIE 29   PAD  18   Medication Adjustments  No      Stable

## 2025-03-08 NOTE — ED PROVIDER NOTE - NSFOLLOWUPINSTRUCTIONS_ED_ALL_ED_FT
Please follow-up with your primary doctor.  Please take prednisone as prescribed.  Please use albuterol every 4 hours as needed for wheezing.  Please use your Advair discus.

## 2025-03-08 NOTE — ED PROVIDER NOTE - PHYSICAL EXAMINATION
GEN - NAD; well appearing; A+O x3  HEAD - NC/AT    EYES - EOMI, no conjunctival pallor, no scleral icterus  ENT -   mucous membranes  moist , no discharge  PULM -diffuse wheezes  COR -  RRR, S1 S2, no murmurs  ABD - ND, NT, soft, no guarding, no rebound, no masses    EXTREMS -no edema, no deformity, warm and well perfused   SKIN - no rash or bruising      NEUROLOGIC - alert, sensation nl, motor 5/5 RUE/LUE/RLE/LLE

## 2025-03-08 NOTE — ED ADULT NURSE NOTE - OBJECTIVE STATEMENT
Pt presents to the ED c/o difficulty breathing. Pt A&Ox4, ambulatory, pmh of asthma. Pt states he ran out of his albuterol inhaler about 2 weeks ago & has been experiencing difficulty breathing. Pt denies any pain, chest discomfort. Pt denies fevers or chills. Safety & comfort measures maintained. [id number]

## 2025-03-08 NOTE — ED PROVIDER NOTE - OBJECTIVE STATEMENT
54 y/o malePresents with shortness of breath.  Patient states he ran out of his albuterol inhaler 2 weeks ago.  Patient has had a cough for 3 weeks.  Patient no longer has his Advair as well.  Patient was unable to get into his PCP.No fevers or chills.

## 2025-03-08 NOTE — ED ADULT NURSE NOTE - NSFALLUNIVINTERV_ED_ALL_ED
Bed/Stretcher in lowest position, wheels locked, appropriate side rails in place/Call bell, personal items and telephone in reach/Instruct patient to call for assistance before getting out of bed/chair/stretcher/Non-slip footwear applied when patient is off stretcher/[place] to call system/Physically safe environment - no spills, clutter or unnecessary equipment/Purposeful proactive rounding/Room/bathroom lighting operational, light cord in reach

## 2025-04-03 NOTE — ED PROVIDER NOTE - CADM POA CENTRAL LINE
Telemetry with sinus evangelista, asymptomatic  Likely due to hyperkalemia  Medical treatment for hyperkalemia  Check TSH, avoid AV james blocking agents  Telemetry monitoring
No
